# Patient Record
Sex: MALE | Race: WHITE
[De-identification: names, ages, dates, MRNs, and addresses within clinical notes are randomized per-mention and may not be internally consistent; named-entity substitution may affect disease eponyms.]

---

## 2019-07-25 ENCOUNTER — HOSPITAL ENCOUNTER (OUTPATIENT)
Dept: HOSPITAL 53 - M SMT | Age: 44
End: 2019-07-25
Attending: NURSE PRACTITIONER
Payer: COMMERCIAL

## 2019-07-25 DIAGNOSIS — R31.29: Primary | ICD-10-CM

## 2019-07-25 LAB
APPEARANCE UR: CLEAR
BACTERIA UR QL AUTO: NEGATIVE
BILIRUB UR QL STRIP.AUTO: NEGATIVE
GLUCOSE UR QL STRIP.AUTO: NEGATIVE MG/DL
HGB UR QL STRIP.AUTO: NEGATIVE
KETONES UR QL STRIP.AUTO: NEGATIVE MG/DL
LEUKOCYTE ESTERASE UR QL STRIP.AUTO: NEGATIVE
MUCOUS THREADS URNS QL MICRO: (no result)
NITRITE UR QL STRIP.AUTO: NEGATIVE
PH UR STRIP.AUTO: 5 UNITS (ref 5–9)
PROT UR QL STRIP.AUTO: NEGATIVE MG/DL
RBC # UR AUTO: 1 /HPF (ref 0–3)
SP GR UR STRIP.AUTO: 1.02 (ref 1–1.03)
SQUAMOUS #/AREA URNS AUTO: 0 /HPF (ref 0–6)
UROBILINOGEN UR QL STRIP.AUTO: 0.2 MG/DL (ref 0–2)
WBC #/AREA URNS AUTO: 0 /HPF (ref 0–3)

## 2019-07-29 ENCOUNTER — HOSPITAL ENCOUNTER (OUTPATIENT)
Dept: HOSPITAL 53 - M SMT | Age: 44
End: 2019-07-29
Attending: INTERNAL MEDICINE
Payer: COMMERCIAL

## 2019-07-29 DIAGNOSIS — J44.9: Primary | ICD-10-CM

## 2019-07-29 NOTE — REP
Clinical:  COPD.

 

Technique:  PA and lateral.

 

Comparison:  02/28/2006.

 

Findings:

Mediastinum and cardiac silhouette are normal.  Bibasilar linear acute and/or

chronic fibroatelectatic changes appreciated.  No focal consolidation.  No

effusion.  No pneumothorax.  Skeletal structures intact.

 

Impression:

Acute versus chronic bibasilar fibroatelectatic changes.

 

 

Electronically Signed by

Arnaldo Aguilera MD 07/29/2019 02:46 P

## 2019-09-10 ENCOUNTER — HOSPITAL ENCOUNTER (OUTPATIENT)
Dept: HOSPITAL 53 - M LABNEURO | Age: 44
End: 2019-09-10
Attending: PSYCHIATRY & NEUROLOGY
Payer: COMMERCIAL

## 2019-09-10 DIAGNOSIS — E07.9: ICD-10-CM

## 2019-09-10 DIAGNOSIS — E11.9: Primary | ICD-10-CM

## 2019-09-10 DIAGNOSIS — Z11.9: ICD-10-CM

## 2019-09-10 DIAGNOSIS — M79.2: ICD-10-CM

## 2019-09-10 LAB
ALBUMIN SERPL BCG-MCNC: 3.9 GM/DL (ref 3.2–5.2)
ALT SERPL W P-5'-P-CCNC: 21 U/L (ref 12–78)
BASOPHILS # BLD AUTO: 0.1 10^3/UL (ref 0–0.2)
BASOPHILS NFR BLD AUTO: 0.8 % (ref 0–1)
BILIRUB SERPL-MCNC: 0.3 MG/DL (ref 0.2–1)
BUN SERPL-MCNC: 17 MG/DL (ref 7–18)
CALCIUM SERPL-MCNC: 9.2 MG/DL (ref 8.5–10.1)
CHLORIDE SERPL-SCNC: 106 MEQ/L (ref 98–107)
CO2 SERPL-SCNC: 28 MEQ/L (ref 21–32)
CREAT SERPL-MCNC: 1.11 MG/DL (ref 0.7–1.3)
EOSINOPHIL # BLD AUTO: 0.3 10^3/UL (ref 0–0.5)
EOSINOPHIL NFR BLD AUTO: 2.5 % (ref 0–3)
ERYTHROCYTE [SEDIMENTATION RATE] IN BLOOD BY WESTERGREN METHOD: 4 MM/HR (ref 0–15)
EST. AVERAGE GLUCOSE BLD GHB EST-MCNC: 108 MG/DL (ref 60–110)
FOLATE SERPL-MCNC: 6.6 NG/ML
GFR SERPL CREATININE-BSD FRML MDRD: > 60 ML/MIN/{1.73_M2} (ref 60–?)
GLUCOSE SERPL-MCNC: 96 MG/DL (ref 70–100)
HCT VFR BLD AUTO: 45.9 % (ref 42–52)
HGB BLD-MCNC: 15 G/DL (ref 13.5–17.5)
LYMPHOCYTES # BLD AUTO: 3.8 10^3/UL (ref 1.5–5)
LYMPHOCYTES NFR BLD AUTO: 38.7 % (ref 24–44)
MCH RBC QN AUTO: 30.5 PG (ref 27–33)
MCHC RBC AUTO-ENTMCNC: 32.7 G/DL (ref 32–36.5)
MCV RBC AUTO: 93.5 FL (ref 80–96)
MONOCYTES # BLD AUTO: 0.7 10^3/UL (ref 0–0.8)
MONOCYTES NFR BLD AUTO: 6.6 % (ref 0–5)
NEUTROPHILS # BLD AUTO: 5.1 10^3/UL (ref 1.5–8.5)
NEUTROPHILS NFR BLD AUTO: 51 % (ref 36–66)
PLATELET # BLD AUTO: 273 10^3/UL (ref 150–450)
POTASSIUM SERPL-SCNC: 4.6 MEQ/L (ref 3.5–5.1)
PROT SERPL-MCNC: 6.7 GM/DL (ref 6.4–8.2)
RBC # BLD AUTO: 4.91 10^6/UL (ref 4.3–6.1)
RHEUMATOID FACT SERPL-ACNC: < 10 IU/ML (ref ?–15)
SODIUM SERPL-SCNC: 141 MEQ/L (ref 136–145)
T4 FREE SERPL-MCNC: 0.79 NG/DL (ref 0.76–1.46)
VIT B12 SERPL-MCNC: 454 PG/ML
WBC # BLD AUTO: 9.9 10^3/UL (ref 4–10)

## 2019-09-14 LAB
A-TOCOPHEROL VIT E SERPL-MCNC: 12.7 MG/L (ref 7–25.1)
B BURGDOR IGG+IGM SER-ACNC: <0.91 ISR (ref 0–0.9)
B BURGDOR IGM SER IA-ACNC: <0.8 INDEX (ref 0–0.79)
GAMMA TOCOPHEROL SERPL-MCNC: 2.3 MG/L (ref 0.5–5.5)
PYRIDOXAL PHOS SERPL-MCNC: 9.9 UG/L (ref 5.3–46.7)
VIT B1 BLD-SCNC: 168.1 NMOL/L (ref 66.5–200)

## 2019-11-11 ENCOUNTER — HOSPITAL ENCOUNTER (OUTPATIENT)
Dept: HOSPITAL 53 - M SMT | Age: 44
End: 2019-11-11
Attending: NURSE PRACTITIONER
Payer: COMMERCIAL

## 2019-11-11 DIAGNOSIS — N39.41: Primary | ICD-10-CM

## 2019-11-11 LAB
APPEARANCE UR: CLEAR
BACTERIA UR QL AUTO: NEGATIVE
BILIRUB UR QL STRIP.AUTO: NEGATIVE
GLUCOSE UR QL STRIP.AUTO: NEGATIVE MG/DL
HGB UR QL STRIP.AUTO: NEGATIVE
KETONES UR QL STRIP.AUTO: (no result) MG/DL
LEUKOCYTE ESTERASE UR QL STRIP.AUTO: NEGATIVE
NITRITE UR QL STRIP.AUTO: NEGATIVE
PH UR STRIP.AUTO: 6 UNITS (ref 5–9)
PROT UR QL STRIP.AUTO: (no result) MG/DL
RBC # UR AUTO: 0 /HPF (ref 0–3)
SP GR UR STRIP.AUTO: 1.02 (ref 1–1.03)
SQUAMOUS #/AREA URNS AUTO: 0 /HPF (ref 0–6)
UROBILINOGEN UR QL STRIP.AUTO: 0.2 MG/DL (ref 0–2)
WBC #/AREA URNS AUTO: 2 /HPF (ref 0–3)

## 2019-11-25 ENCOUNTER — HOSPITAL ENCOUNTER (OUTPATIENT)
Dept: HOSPITAL 53 - M PLALAB | Age: 44
End: 2019-11-25
Attending: NURSE PRACTITIONER
Payer: COMMERCIAL

## 2019-11-25 ENCOUNTER — HOSPITAL ENCOUNTER (OUTPATIENT)
Dept: HOSPITAL 53 - M PLALAB | Age: 44
End: 2019-11-25
Attending: PHYSICIAN ASSISTANT
Payer: COMMERCIAL

## 2019-11-25 DIAGNOSIS — N39.41: ICD-10-CM

## 2019-11-25 DIAGNOSIS — F41.9: Primary | ICD-10-CM

## 2019-11-25 DIAGNOSIS — Z01.818: Primary | ICD-10-CM

## 2019-11-25 LAB
APTT BLD: 35.8 SECONDS (ref 25–38.4)
BUN SERPL-MCNC: 17 MG/DL (ref 7–18)
CALCIUM SERPL-MCNC: 9.6 MG/DL (ref 8.5–10.1)
CHLORIDE SERPL-SCNC: 104 MEQ/L (ref 98–107)
CO2 SERPL-SCNC: 28 MEQ/L (ref 21–32)
CREAT SERPL-MCNC: 1.45 MG/DL (ref 0.7–1.3)
GFR SERPL CREATININE-BSD FRML MDRD: 56.5 ML/MIN/{1.73_M2} (ref 60–?)
GLUCOSE SERPL-MCNC: 83 MG/DL (ref 70–100)
HCT VFR BLD AUTO: 51.4 % (ref 42–52)
HGB BLD-MCNC: 16.8 G/DL (ref 13.5–17.5)
INR PPP: 1.02
MCH RBC QN AUTO: 30.8 PG (ref 27–33)
MCHC RBC AUTO-ENTMCNC: 32.7 G/DL (ref 32–36.5)
MCV RBC AUTO: 94.1 FL (ref 80–96)
PLATELET # BLD AUTO: 323 10^3/UL (ref 150–450)
POTASSIUM SERPL-SCNC: 5.7 MEQ/L (ref 3.5–5.1)
PROTHROMBIN TIME: 13.1 SECONDS (ref 11.8–14)
RBC # BLD AUTO: 5.46 10^6/UL (ref 4.3–6.1)
SODIUM SERPL-SCNC: 137 MEQ/L (ref 136–145)
T4 FREE SERPL-MCNC: 0.96 NG/DL (ref 0.76–1.46)
TSH SERPL DL<=0.005 MIU/L-ACNC: 2.71 UIU/ML (ref 0.36–3.74)
WBC # BLD AUTO: 10 10^3/UL (ref 4–10)

## 2019-11-26 ENCOUNTER — HOSPITAL ENCOUNTER (OUTPATIENT)
Dept: HOSPITAL 53 - M SFHCPLAZ | Age: 44
End: 2019-11-26
Attending: PHYSICIAN ASSISTANT
Payer: COMMERCIAL

## 2019-11-26 DIAGNOSIS — Z53.9: ICD-10-CM

## 2019-11-26 DIAGNOSIS — N18.3: Primary | ICD-10-CM

## 2019-12-15 ENCOUNTER — HOSPITAL ENCOUNTER (EMERGENCY)
Dept: HOSPITAL 53 - M ED | Age: 44
Discharge: HOME | End: 2019-12-15
Payer: COMMERCIAL

## 2019-12-15 VITALS — SYSTOLIC BLOOD PRESSURE: 140 MMHG | DIASTOLIC BLOOD PRESSURE: 87 MMHG

## 2019-12-15 VITALS — BODY MASS INDEX: 31.92 KG/M2 | WEIGHT: 198.64 LBS | HEIGHT: 66 IN

## 2019-12-15 DIAGNOSIS — J03.90: Primary | ICD-10-CM

## 2019-12-15 DIAGNOSIS — Z88.8: ICD-10-CM

## 2019-12-15 DIAGNOSIS — Z79.899: ICD-10-CM

## 2019-12-15 DIAGNOSIS — F17.210: ICD-10-CM

## 2019-12-19 ENCOUNTER — HOSPITAL ENCOUNTER (OUTPATIENT)
Dept: HOSPITAL 53 - M PLALAB | Age: 44
End: 2019-12-19
Attending: PHYSICIAN ASSISTANT
Payer: COMMERCIAL

## 2019-12-19 DIAGNOSIS — N18.3: Primary | ICD-10-CM

## 2019-12-19 LAB
ALBUMIN SERPL BCG-MCNC: 3.8 GM/DL (ref 3.2–5.2)
ALT SERPL W P-5'-P-CCNC: 27 U/L (ref 12–78)
BILIRUB SERPL-MCNC: 0.4 MG/DL (ref 0.2–1)
BUN SERPL-MCNC: 22 MG/DL (ref 7–18)
CALCIUM SERPL-MCNC: 8.9 MG/DL (ref 8.5–10.1)
CHLORIDE SERPL-SCNC: 106 MEQ/L (ref 98–107)
CO2 SERPL-SCNC: 27 MEQ/L (ref 21–32)
CREAT SERPL-MCNC: 1.26 MG/DL (ref 0.7–1.3)
GFR SERPL CREATININE-BSD FRML MDRD: > 60 ML/MIN/{1.73_M2} (ref 60–?)
GLUCOSE SERPL-MCNC: 79 MG/DL (ref 70–100)
POTASSIUM SERPL-SCNC: 4.2 MEQ/L (ref 3.5–5.1)
PROT SERPL-MCNC: 6.9 GM/DL (ref 6.4–8.2)
SODIUM SERPL-SCNC: 140 MEQ/L (ref 136–145)

## 2019-12-27 ENCOUNTER — HOSPITAL ENCOUNTER (OUTPATIENT)
Dept: HOSPITAL 53 - M SMT | Age: 44
End: 2019-12-27
Attending: NURSE PRACTITIONER
Payer: COMMERCIAL

## 2019-12-27 DIAGNOSIS — N31.9: Primary | ICD-10-CM

## 2020-01-02 ENCOUNTER — HOSPITAL ENCOUNTER (OUTPATIENT)
Dept: HOSPITAL 53 - M SDC | Age: 45
Discharge: HOME | End: 2020-01-02
Attending: UROLOGY
Payer: COMMERCIAL

## 2020-01-02 VITALS — SYSTOLIC BLOOD PRESSURE: 119 MMHG | DIASTOLIC BLOOD PRESSURE: 64 MMHG

## 2020-01-02 VITALS — HEIGHT: 66 IN | BODY MASS INDEX: 30.6 KG/M2 | WEIGHT: 190.4 LBS

## 2020-01-02 DIAGNOSIS — Z79.899: ICD-10-CM

## 2020-01-02 DIAGNOSIS — K21.9: ICD-10-CM

## 2020-01-02 DIAGNOSIS — J44.9: ICD-10-CM

## 2020-01-02 DIAGNOSIS — F32.9: ICD-10-CM

## 2020-01-02 DIAGNOSIS — F41.9: ICD-10-CM

## 2020-01-02 DIAGNOSIS — E78.5: ICD-10-CM

## 2020-01-02 DIAGNOSIS — F17.210: ICD-10-CM

## 2020-01-02 DIAGNOSIS — N39.41: Primary | ICD-10-CM

## 2020-01-02 PROCEDURE — 52287 CYSTOSCOPY CHEMODENERVATION: CPT

## 2020-01-02 PROCEDURE — 93005 ELECTROCARDIOGRAM TRACING: CPT

## 2020-01-02 NOTE — RO
DATE OF PROCEDURE:  01/02/2020

 

PREPROCEDURE DIAGNOSIS:   Urge urinary incontinence.

 

POSTPROCEDURE DIAGNOSIS:    Urge urinary incontinence.

 

PROCEDURE:  Cystoscopy, transurethral intravesical bladder Botox injection.

 

SURGEON:  Jim Perez MD

 

ASSISTANT:  None.

 

ANESTHESIA:  MAC.

 

OPERATIVE INDICATIONS:  This is a 44-year-old male with urge urinary

incontinence, which has been refractory to oral medical therapy.  He is brought

to the operating room today for the above listed procedure.

 

DESCRIPTION OF PROCEDURE:   The patient was brought to the operating room where

MAC anesthesia was administered.  Prophylactic antibiotics were infused.  He was

then placed in the dorsal lithotomy position and prepped and draped in the usual

sterile fashion.  A rigid cystoscope was inserted into the urethral meatus and

advanced in the bladder.  The bladder appeared to be moderately trabeculated.  
At

this point, a total of 100 units of Botox reconstituted in a normal saline was

injected into the bladder at approximately 20 different sites.  Once done, there

was excellent hemostasis.  The bladder was then emptied of all fluid and this

marked conclusion of the procedure.  The patient was then taken out of the 
dorsal

lithotomy position, awakened from anesthesia and transported to the recovery 
room

in stable condition.

 

ESTIMATED BLOOD LOSS:  5 mL.

 

COMPLICATIONS:  None.

 

SPECIMENS:  None.

 

PLAN:  The patient will followup in the clinic in a few weeks for postoperative

visit.

REANNA

## 2020-01-02 NOTE — ECGEPIP
[facility]

                                       

                                       Test Date:    2020

Pat Name:     AMAN MENDOZA               Department:   

Patient ID:   [id number]                 Room:         -

Gender:       Male                     Technician:   JUNE

:          1975               Requested By: CATRACHO MARR

Order Number: [id number]     Reading MD:   Edgard Dejesus

                                 Measurements

Intervals                              Axis          

Rate:         59                       P:            4

MO:           150                      QRS:          33

QRSD:         88                       T:            33

QT:           387                                    

QTc:          385                                    

                           Interpretive Statements

Sinus bradycardia

Delayed anterior R wave progression

Early repolarization

Comparison tracing not on file

Electronically Signed on 2020 20:42:59 EST by Edgard Dejesus

## 2020-02-29 ENCOUNTER — HOSPITAL ENCOUNTER (OUTPATIENT)
Dept: HOSPITAL 53 - M RAD | Age: 45
End: 2020-02-29
Attending: ORTHOPAEDIC SURGERY
Payer: COMMERCIAL
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
## 2020-02-29 DIAGNOSIS — S46.812A: ICD-10-CM

## 2020-02-29 DIAGNOSIS — M75.41: ICD-10-CM
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
## 2020-02-29 DIAGNOSIS — S46.011A: Primary | ICD-10-CM
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
## 2020-03-01 NOTE — REP
MRI RIGHT SHOULDER:

 

TECHNIQUE:  Axial T2 fat sat, gradient echo, sagittal oblique T2 fat sat, coronal

oblique T1, T2 fat sat.

 

Supraspinatus tendon demonstrates focal undersurface tear in the distal insertion

site. This is a partial undersurface tear. There is scattered, ill-defined high

signal in the subscapularis tendon compatible with mild to moderate

tendinopathy/tendonitis. Infraspinatus tendon demonstrates scattered

hyperintensity in the distal aspect of the tendon, suggesting tendonitis and

probably a partial intrasubstance/undersurface tear.  There are moderate

hypertrophic degenerative changes of the acromioclavicular joint with downward

sloping of the acromion, which is type 1. Biceps tendon is within the bicipital

groove. There is no Hill-Sachs deformity. There is linear high signal in the

lateral deltoid muscle suggesting a small partial tear. There appears to be some

fraying of the biceps labral complex, but otherwise no discrete labral tear is

seen. There is no paralabral cyst. There is subchondral marrow edema at the

acromioclavicular joint. There is mild fluid in the subacromial/subdeltoid

bursae. There is mild chondromalacia of the glenohumeral joint.

 

IMPRESSION:

 

Partial undersurface tear distal supraspinatus tendon. Partial intrasubstance and

undersurface tear infraspinatus tendon. Mild to moderate subscapularis

tendinopathy/tendonitis. Moderate hypertrophic degenerative changes

acromioclavicular joint. Partial tear lateral deltoid muscle. Fraying of the

biceps labral complex without a discrete labral tear. Very mild fluid in the

subacromial/subdeltoid bursae.

 

 

Electronically Signed by

Trenton Modi MD 03/01/2020 06:24 P

## 2020-04-27 ENCOUNTER — HOSPITAL ENCOUNTER (OUTPATIENT)
Dept: HOSPITAL 53 - M EKG | Age: 45
End: 2020-04-27
Attending: ORTHOPAEDIC SURGERY
Payer: COMMERCIAL

## 2020-04-27 ENCOUNTER — HOSPITAL ENCOUNTER (OUTPATIENT)
Dept: HOSPITAL 53 - M PLALAB | Age: 45
End: 2020-04-27
Attending: ORTHOPAEDIC SURGERY
Payer: COMMERCIAL

## 2020-04-27 DIAGNOSIS — M75.41: Primary | ICD-10-CM

## 2020-04-27 DIAGNOSIS — Z01.818: ICD-10-CM

## 2020-04-27 LAB
CHLORIDE SERPL-SCNC: 105 MEQ/L (ref 98–107)
CO2 SERPL-SCNC: 30 MEQ/L (ref 21–32)
HCT VFR BLD AUTO: 46.4 % (ref 42–52)
HGB BLD-MCNC: 15.5 G/DL (ref 13.5–17.5)
MCH RBC QN AUTO: 32 PG (ref 27–33)
MCHC RBC AUTO-ENTMCNC: 33.4 G/DL (ref 32–36.5)
MCV RBC AUTO: 95.9 FL (ref 80–96)
PLATELET # BLD AUTO: 304 10^3/UL (ref 150–450)
POTASSIUM SERPL-SCNC: 4.5 MEQ/L (ref 3.5–5.1)
RBC # BLD AUTO: 4.84 10^6/UL (ref 4.3–6.1)
SODIUM SERPL-SCNC: 139 MEQ/L (ref 136–145)
WBC # BLD AUTO: 9.2 10^3/UL (ref 4–10)

## 2020-04-28 ENCOUNTER — HOSPITAL ENCOUNTER (OUTPATIENT)
Dept: HOSPITAL 53 - M LABSMTC | Age: 45
End: 2020-04-28
Attending: ANESTHESIOLOGY
Payer: COMMERCIAL

## 2020-04-28 DIAGNOSIS — Z11.59: ICD-10-CM

## 2020-04-28 DIAGNOSIS — Z01.818: Primary | ICD-10-CM

## 2020-04-28 NOTE — ECGEPIP
[facility]

                                       

                                       Test Date:    2020

Pat Name:     AMAN MENDOZA               Department:   

Patient ID:   [id number]                 Room:         -

Gender:       Male                     Technician:   ARNOLDO

:          1975               Requested By: NEELIMA BUCIO 

Order Number: [id number]     Reading MD:   Edgard Dejesus

                                 Measurements

Intervals                              Axis          

Rate:         62                       P:            -26

DC:           152                      QRS:          55

QRSD:         72                       T:            58

QT:           375                                    

QTc:          383                                    

                           Interpretive Statements

Normal sinus rhythm

Delayed anterior R wave progression

Early repolarization

No significant change when compared to prior tracing of 2020

Electronically Signed on 2020 7:08:39 EDT by Edgard Dejesus

## 2020-04-28 NOTE — REPPI
Clinical:  Preoperative assessment.  History of asthma/COPD .

 

Comparison: 07/29/2019 .

 

Technique:  PA and lateral.

 

Findings:

The mediastinum and cardiac silhouette are normal.  The lung fields are clear and

without acute consolidation, effusion, or pneumothorax.  The skeletal structures

are intact and normal.

 

Impression:

1.   No acute cardiopulmonary process.

 

 

Electronically Signed by

Arnaldo Aguilera MD 04/28/2020 04:32 A

## 2020-04-30 ENCOUNTER — HOSPITAL ENCOUNTER (OUTPATIENT)
Dept: HOSPITAL 53 - M SDC | Age: 45
Discharge: HOME | End: 2020-04-30
Attending: ORTHOPAEDIC SURGERY
Payer: COMMERCIAL

## 2020-04-30 VITALS — BODY MASS INDEX: 30.22 KG/M2 | WEIGHT: 188 LBS | HEIGHT: 66 IN

## 2020-04-30 VITALS — SYSTOLIC BLOOD PRESSURE: 129 MMHG | DIASTOLIC BLOOD PRESSURE: 85 MMHG

## 2020-04-30 DIAGNOSIS — I10: ICD-10-CM

## 2020-04-30 DIAGNOSIS — J44.9: ICD-10-CM

## 2020-04-30 DIAGNOSIS — F32.9: ICD-10-CM

## 2020-04-30 DIAGNOSIS — E78.5: ICD-10-CM

## 2020-04-30 DIAGNOSIS — M75.41: Primary | ICD-10-CM

## 2020-04-30 DIAGNOSIS — Z79.899: ICD-10-CM

## 2020-04-30 DIAGNOSIS — Z79.51: ICD-10-CM

## 2020-04-30 DIAGNOSIS — M75.111: ICD-10-CM

## 2020-04-30 DIAGNOSIS — F41.9: ICD-10-CM

## 2020-04-30 PROCEDURE — 29823 SHO ARTHRS SRG XTNSV DBRDMT: CPT

## 2020-04-30 PROCEDURE — 29826 SHO ARTHRS SRG DECOMPRESSION: CPT

## 2020-04-30 PROCEDURE — 29824 SHO ARTHRS SRG DSTL CLAVICLC: CPT

## 2020-04-30 NOTE — RO
DATE OF PROCEDURE:  04/30/2020

 

PREOPERATIVE DIAGNOSIS:  Right shoulder impingement syndrome and partial

undersurface tear supraspinatus tendon.

 

POSTOPERATIVE DIAGNOSIS:  Right shoulder impingement syndrome and partial

undersurface tear supraspinatus tendon.

 

PLANNED PROCEDURE:  Right shoulder arthroscopy, distal clavicle excision,

subacromial decompression.

 

PROCEDURE PERFORMED:  Right shoulder arthroscopy, extensive debridement

intra-articular shoulder joint, subacromial decompression, debridement

undersurface, rotator cuff takedown coracoacromial (CA) ligament and distal

clavicle excision.

 

SURGEON:  Jose Guerra MD

 

ASSISTANT:  Jeffrey Corbin

 

ANESTHETIST:  Dr. Verdugo

 

TYPE OF ANESTHETIC:  General anesthetic and preoperative block.

 

OPERATIVE PREAMBLE:  This 45-year-old man was experiencing pain in his right

shoulder refractory to nonsurgical management.  MRI demonstrated partial

undersurface tear of the distal supraspinatus tendon.  There was also pain at 
his

acromioclavicular (AC) joint with positive cross-body adduction test.  We talked

about the pros, the cons, the risks and benefits of nonsurgical management 
versus

arthroscopy for subacromial decompression, distal clavicle excision, 
debridement,

partial undersurface tear, as well diagnostic arthroscopy.  He wished to go

ahead.  We reiterated the risks in preoperative holding.  I marked the right

upper extremity and proceeded to surgery.

 

The patient had received a preoperative block by the anesthetic team.

 

DESCRIPTION OF PROCEDURE:  The patient was brought to the operating theater.  He

was administered general anesthetic.  He was placed right lateral decubitus.  We

used an axillary roll.  All bony prominences were padded.  Sequential 
compression

devices (SCDs) were used on the down leg.  Venus Hugger was used.  Limb was

prepped and draped in the usual sterile fashion, allowing ample over 3 minutes

prep solution drying time.  Bed was normally situated in the room.  Limb was

placed into a 45-degree abduction traction setup with 10 pounds of traction to

the arm.  Preoperative time-out was performed to confirm the site, the patient

and the surgery, and the patient had received 2 grams of IV Ancef prior to the

start of the case.

 

Began by making a standard posterior arthroscopy portal, inserting arthroscope

into the intra-articular portion of the right shoulder.  I performed a full

diagnostic arthroscopy.  Subscapularis was normal.  There was mild fraying at 
the

biceps sheath, which was gently debrided.  Biceps root was normal and stable and

solid on probing after making inside-out anterior arthroscopy portal through the

rotator interval just posterior to the biceps tendon.  Cartilage on the humeral

head and glenoid was normal.  There did appear to be a slightly diminutive

anterior inferior labrum; however, the patient was not having instability

symptoms preoperatively.  Arthroscopy pictures were taken throughout.  There was

a small undersurface fraying to the anterior leading into the supraspinatus

tendon.  This was marked through inside-out spinal needle localization 
technique.

 

 

Arthroscope was removed from the intra-articular portion of the shoulder and

inserted into the subacromial space.  Lateral portal was created using a

previously inserted spinal needle.  Following full subacromial debridement of 
the

bursa.  The bursa was minimally inflamed.  I then used the bur to perform

subacromial decompression well as distal clavicle excision for

a width of approximately 1.1 cm distal clavicle.  I inserted

the arthroscope into the AC joint to ensure full debridement to the superior

surface.

 

I then probed the partial thickness area of the anterior leading into

supraspinatus tear.  This was definitely under 50% and had good detachment all

the way out to the lateral footprint, so it was elected not to take this down or

perform a  partial repair as this was under 50% of the tendon thickness.

 

The shoulder was thoroughly irrigated.  The scope was removed.  Arthroscopy

pictures saved onto the [employer] system and printed out.  Portal sites, three of

them, closed with subcutaneous #3-0 Monocryl and Steri-Strips were applied after

the wound was cleaned with a wet and dry dressing.  Adaptic, 4x8 gauze and ABD

dressings were placed and overwrapped with cloth tape.  The patient was put into

an upper extremity abduction pillow sling after being removed from the traction

setup.

 

The patient was woken up from general anesthetic, transferred off the operating

table, and taken to the postanesthetic care unit in stable condition.  All

sponge, needle, and instrument counts were correct.  No complications.  
Estimated

blood loss 50 mL.

 

PLAN:  The patient will be discharged home according to day-surgery criteria.

Prescription has been sent into their pharmacy of choice electronically.  They

will follow up in the office in 2 weeks' time.  I would like them to change the

dressing on postoperative day #1 and start immediate pendulum exercises as well

as Bakari elbow exercises.

REANNA

## 2020-05-31 ENCOUNTER — HOSPITAL ENCOUNTER (OUTPATIENT)
Dept: HOSPITAL 53 - M LABSMTC | Age: 45
End: 2020-05-31
Attending: PHYSICIAN ASSISTANT
Payer: COMMERCIAL

## 2020-05-31 DIAGNOSIS — Z11.59: Primary | ICD-10-CM

## 2020-06-25 ENCOUNTER — HOSPITAL ENCOUNTER (OUTPATIENT)
Dept: HOSPITAL 53 - M SFHCPLAZ | Age: 45
End: 2020-06-25
Attending: PHYSICIAN ASSISTANT
Payer: COMMERCIAL

## 2020-06-25 DIAGNOSIS — E78.2: Primary | ICD-10-CM

## 2020-06-25 LAB
ALBUMIN SERPL BCG-MCNC: 3.9 GM/DL (ref 3.2–5.2)
ALT SERPL W P-5'-P-CCNC: 31 U/L (ref 12–78)
BILIRUB SERPL-MCNC: 0.3 MG/DL (ref 0.2–1)
BUN SERPL-MCNC: 15 MG/DL (ref 7–18)
CALCIUM SERPL-MCNC: 8.8 MG/DL (ref 8.5–10.1)
CHLORIDE SERPL-SCNC: 107 MEQ/L (ref 98–107)
CHOLEST SERPL-MCNC: 233 MG/DL (ref ?–200)
CHOLEST/HDLC SERPL: 5.68 {RATIO} (ref ?–5)
CK SERPL-CCNC: 115 U/L (ref 39–308)
CO2 SERPL-SCNC: 27 MEQ/L (ref 21–32)
CREAT SERPL-MCNC: 1.23 MG/DL (ref 0.7–1.3)
GFR SERPL CREATININE-BSD FRML MDRD: > 60 ML/MIN/{1.73_M2} (ref 60–?)
GLUCOSE SERPL-MCNC: 88 MG/DL (ref 70–100)
HDLC SERPL-MCNC: 41 MG/DL (ref 40–?)
LDLC SERPL CALC-MCNC: 147 MG/DL (ref ?–100)
NONHDLC SERPL-MCNC: 192 MG/DL
POTASSIUM SERPL-SCNC: 4.6 MEQ/L (ref 3.5–5.1)
PROT SERPL-MCNC: 7.3 GM/DL (ref 6.4–8.2)
SODIUM SERPL-SCNC: 139 MEQ/L (ref 136–145)
TRIGL SERPL-MCNC: 223 MG/DL (ref ?–150)

## 2020-06-27 ENCOUNTER — HOSPITAL ENCOUNTER (OUTPATIENT)
Dept: HOSPITAL 53 - M LABSMTC | Age: 45
End: 2020-06-27
Attending: ORTHOPAEDIC SURGERY
Payer: COMMERCIAL

## 2020-06-27 DIAGNOSIS — Z03.89: ICD-10-CM

## 2020-06-27 DIAGNOSIS — Z11.59: Primary | ICD-10-CM

## 2021-01-22 ENCOUNTER — HOSPITAL ENCOUNTER (OUTPATIENT)
Dept: HOSPITAL 53 - M LABSMTC | Age: 46
End: 2021-01-22
Attending: ANESTHESIOLOGY
Payer: COMMERCIAL

## 2021-01-22 ENCOUNTER — HOSPITAL ENCOUNTER (OUTPATIENT)
Dept: HOSPITAL 53 - M SFHCPLAZ | Age: 46
End: 2021-01-22
Attending: FAMILY MEDICINE
Payer: COMMERCIAL

## 2021-01-22 DIAGNOSIS — N18.30: Primary | ICD-10-CM

## 2021-01-22 DIAGNOSIS — Z01.812: Primary | ICD-10-CM

## 2021-01-22 DIAGNOSIS — Z20.822: ICD-10-CM

## 2021-01-22 LAB
BUN SERPL-MCNC: 12 MG/DL (ref 7–18)
CALCIUM SERPL-MCNC: 9.3 MG/DL (ref 8.5–10.1)
CHLORIDE SERPL-SCNC: 108 MEQ/L (ref 98–107)
CO2 SERPL-SCNC: 28 MEQ/L (ref 21–32)
CREAT SERPL-MCNC: 1.02 MG/DL (ref 0.7–1.3)
GFR SERPL CREATININE-BSD FRML MDRD: > 60 ML/MIN/{1.73_M2} (ref 60–?)
GLUCOSE SERPL-MCNC: 78 MG/DL (ref 70–100)
POTASSIUM SERPL-SCNC: 4.6 MEQ/L (ref 3.5–5.1)
SODIUM SERPL-SCNC: 140 MEQ/L (ref 136–145)

## 2021-01-27 ENCOUNTER — HOSPITAL ENCOUNTER (OUTPATIENT)
Dept: HOSPITAL 53 - M SDC | Age: 46
Discharge: HOME | End: 2021-01-27
Attending: ORTHOPAEDIC SURGERY
Payer: COMMERCIAL

## 2021-01-27 VITALS — BODY MASS INDEX: 31.5 KG/M2 | WEIGHT: 196 LBS | HEIGHT: 66 IN

## 2021-01-27 VITALS — DIASTOLIC BLOOD PRESSURE: 79 MMHG | SYSTOLIC BLOOD PRESSURE: 148 MMHG

## 2021-01-27 DIAGNOSIS — N18.30: ICD-10-CM

## 2021-01-27 DIAGNOSIS — K21.9: ICD-10-CM
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
## 2021-01-27 DIAGNOSIS — F32.9: ICD-10-CM

## 2021-01-27 DIAGNOSIS — F41.9: ICD-10-CM

## 2021-01-27 DIAGNOSIS — S46.012A: Primary | ICD-10-CM

## 2021-01-27 DIAGNOSIS — J44.9: ICD-10-CM

## 2021-01-27 DIAGNOSIS — F17.290: ICD-10-CM
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
## 2021-01-27 DIAGNOSIS — F43.10: ICD-10-CM

## 2021-01-27 DIAGNOSIS — Z88.8: ICD-10-CM
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
## 2021-01-27 DIAGNOSIS — Z79.51: ICD-10-CM

## 2021-01-27 DIAGNOSIS — Z79.899: ICD-10-CM

## 2021-01-27 PROCEDURE — 29827 SHO ARTHRS SRG RT8TR CUF RPR: CPT

## 2021-01-27 PROCEDURE — 64415 NJX AA&/STRD BRCH PLXS IMG: CPT

## 2021-01-27 RX ADMIN — MIDAZOLAM PRN MG: 1 INJECTION INTRAMUSCULAR; INTRAVENOUS at 07:24

## 2021-01-27 NOTE — CCD
Summarization of Episode Note

                             Created on: 2021



AMAN SHIN JULES

External Reference #: [id number]

: 1975

Sex: Male



Demographics





                          Address                   [address]

[place]  [address]

 

                          Home Phone                [phone]

 

                          Preferred Language        Unknown

 

                          Marital Status            Unknown

 

                          Mu-ism Affiliation     Unknown

 

                          Race                      White

 

                          Ethnic Group              Not  or 





Author





                          Author                    [facility]

ems

 

                          Organization              [facility]

ems

 

                          Address                   Unknown

 

                          Phone                     Unavailable







Support





                Name            Relationship    Address         Phone

 

                    AMAN SHIN         GUAR                [address]

[place], [place]  [address] [phone]

 

                    Ana Shin          ECON                [address]

[place]  [address]                       [phone]







Care Team Providers





                    Care Team Member Name Role                Phone

 

                    Eliza Mcdermott      Unavailable         [phone]







PROBLEMS





          Type      Condition ICD9-CM Code WFW38-[place] Code Onset Dates Condition S

tatus SNOMED 

Code                                    Notes

 

        Problem Neurogenic bladder         N31.9           Active  223368131  

 

        Problem Body mass index (BMI) 34.0-34.9, adult         Z68.34          A

ctive  029290014  

 

        Problem Urge incontinence         N39.41          Active  90089865  

 

        Problem Exercise-induced asthma         J45.990         Active  99106298

  

 

                Problem         Gastroesophageal reflux disease, esophagitis pre

sence not specified                 

K21.9                           Active          967969657       He is on omepraz

ole with control of his symptoms. He 

has seen a gastroenterologist in the past. No records available.

 

          Problem   Major depressive disorder, single episode, unspecified      

     F32.9               Active    

10510618                                 

 

           Problem    Chronic obstructive pulmonary disease, unspecified COPD ty

pe            J44.9                 

Active                    23661088                  He apparently carries this d

iagnosis and is maintained on 

Spiriva and Breo. No active exacerbation. Unfortunately he is still smoking. No 
PFTs are readily available in the medical record. He has been advised to quit 
smoking.

 

        Problem Anxiety disorder, unspecified         F41.9           Active  23

7764413 He is on 

clonidine and high-dose fluoxetine. Symptoms are palliated.

 

        Problem Chronic kidney disease, stage 3 (moderate)         N18.3        

   Active  175881832 

Most recent lab work in 2019 was not remarkable.

 

        Problem Lumbar degenerative disc disease         M51.36          Active 

 46268022  

 

        Problem Insomnia due to other mental disorder         F51.05          Ac

tive  71881098  

 

        Problem Obesity, unspecified         E66.9           Active  726517508  

 

        Problem Feeling of incomplete bladder emptying         R39.14          A

ctive  593846785  

 

        Problem Mixed hyperlipidemia         E78.2           Active  221586647 T

reated by his primary 

provider with lovastatin. I cannot locate a recent lipid profile.

 

        Problem Post-traumatic stress disorder, unspecified         F43.10      

    Active  31756279  

 

        Problem Generalized anxiety disorder         F41.1           Active  218

23081  

 

          Problem   Major depressive disorder, recurrent, moderate           F33

.1               Active    

[id number]                                

 

        Problem Mental disorder, not otherwise specified         F99            

 Active  44760940  







ALLERGIES





                    Allergen (clinical drug ingredient) Drug/Non Drug Allergy do

cumented on EMR 

Reaction            Allergy Type        Onset Date          Status

 

                      Feathers   Unknown    Non Drug Allergy            Active

 

                      Effexor    Excessive Emotions Drug Allergy            Acti

ve

 

           escitalopram Lexapro(NDC Code:[address]-07) Fatigue    Drug Allergy 

           Active

 

                      Wellbutrin Aggressive Drug Allergy            Active







ENCOUNTERS from 1975 to 2021





             Encounter    Location     Date         Provider     Diagnosis

 

                [address] [address]     Eliza Mcdermott                                 Pre-op evaluation Z01.818 ; Fistula, lef

t shoulder M25.112 ; Chronic 

obstructive pulmonary disease, unspecified COPD type J44.9 ; Chronic kidney 
disease, stage 3 (moderate) N18.3 ; Exercise-induced asthma J45.990 ; Mixed 
hyperlipidemia E78.2 ; Major depressive disorder, single episode, unspecified 
F32.9 and Gastroesophageal reflux disease, esophagitis presence not specified 
K21.9







IMMUNIZATIONS

No Information



SOCIAL HISTORY

Tobacco Use:



                    Social History Observation Description         Date

 

                    Details (start date - stop date) Current Smoker       



Sex Assigned At Birth:



                          Social History Observation Description

 

                          Sex Assigned At Birth     Unknown



Education:



                    Question            Answer              Notes

 

                    Level of Education: High School          



Audit



                    Question            Answer              Notes

 

                    Total Score:        1                    

 

                    Interpretation:     Alcohol Education    



Language:



                    Question            Answer              Notes

 

                    Languages spoken:   English              

 

                    Languages spoken:   English              



Shinto:



                    Question            Answer              Notes

 

                    Shinto                                No Mandaen beliefs

 that would impact health care.

 

                    Shinto            [employer]    



Sexual Hx:



                    Question            Answer              Notes

 

                    Had sex in the last 12 months (vaginal, oral, or anal)? Yes 

                 

 

                    Have you ever had an STD? No                   

 

                    with                Women only           

 

                    Use protection?     No                   



Drug and Alcohol



                    Question            Answer              Notes

 

                    Total Score:        0                    

 

                    Interpretation:     No problems reported  



Alcohol Screening:



                    Question            Answer              Notes

 

                    Did you have a drink containing alcohol in the past year? Ye

s                  

 

                    Points              3                    

 

                    Interpretation      Negative             

 

                          How often did you have six or more drinks on one occas

ion in the past year? 

Never (0 points)                         

 

                                        How many drinks did you have on a typica

l day when you were drinking in the past

year?                     1 or 2 (0 points)          

 

                          How often did you have a drink containing alcohol in t

he past year? Two to three

times per week (3 points)                



Tobacco Use:



                    Question            Answer              Notes

 

                    Are you a:          Uses tobacco in other forms SMOKE PIPE A

BOUT 10 CIGARETTES A DAY

 

                    Are you a:          current smoker       

 

                    Are you interested in quitting? Ready to quit        

 

                    Counseled the patient on tobacco use, cessation provided 10/

           







REASON FOR REFERRAL

No Information



VITAL SIGNS





                    Weight              193 lbs             

 

                    Height              66 in               

 

                    BMI                 31.15 kg/m2         

 

                    Heart Rate          107 /min            

 

                    Respiratory Rate    20 /min             

 

                    Temperature         97.6 degrees Fahrenheit 

 

                    Oximetry            95                  

 

                    Blood pressure systolic 130 mm Hg           

 

                    Blood pressure diastolic 70 mm Hg            







MEDICATIONS





           Medication SIG (Take, Route, Frequency, Duration) Notes      Start Da

te End Date   

Status

 

           Mucinex 600 MG 1 tab orally bid                                  Acti

ve

 

           FLUoxetine HCl 10 MG 1 capsule Orally Daily                          

        Active

 

           FLUoxetine HCl 40 MG 1 capsule orally Daily                          

        Active

 

           Clonidine HCl 0.2 MG 1 tablet Orally before bedtime            13 Dec

, 2019            Active

 

           Mirtazapine 7.5 MG tablets at bedtime Orally before bedtime          

                        Active

 

                          Ventolin  (90 Base) MCG/ACT 1 puff as needed In

halation every 4 hours as 

needed                                                          Active

 

           Incruse Ellipta 62.5 MCG/INH 1 puff Inhalation Once a day            

                      Active

 

           Omeprazole 40 MG 1 capsule Orally Daily                              

    Active

 

           HydrOXYzine HCl 10 MG 2 tabs Orally before bedtime                   

               Active

 

           Nystatin 894667 UNIT/GM as directed Externally B)Feet Once a day     

                             Active

 

           FLUoxetine HCl 40 MG 1 capsule Orally Once a day for 90 days         

               

Not-Taking

 

                Albuterol Sulfate (2.5 MG/3ML) 0.083% 1 vial Inhalation every 6 

hrs as needed                 

                                                    Active

 

           Breo Ellipta 200-25 MCG/INH 1 puff Inhalation Once a day             

                     Active

 

           Lovastatin 20 MG 1 tab orally Daily                                  

Active

 

           Acetaminophen 500 MG 1 capsule as needed Orally every 6 hrs          

  19 Dec, 2019            

Active







PROCEDURES from 1975 to 2021





                Procedure       Date Ordered    Result          Body Site

 

                ELECTROCARDIOGRAM, COMPLETE EKG 2021      N/A             

 







RESULTS





                    Component           Value               Reference Range

 

                                        Basic Metabolic Profile (BMP)

Reviewed date:2021 07:12:09

Interpretation:

Performing Lab:[facility] LAB[facility]AT[facility] [address] [id number] [phone], Phone:[phone],[place] [address] 

 

                    GLUCOSE, FASTING    78                  

 

                    BLOOD UREA NITROGEN 12                  7-18

 

                    CREATININE FOR GFR  1.02                0.70-1.30

 

                    GLOMERULAR FILTRATION RATE > 60.0              >60

 

                    SODIUM LEVEL        140                 136-145

 

                    POTASSIUM SERUM     4.6                 3.5-5.1

 

                    CHLORIDE LEVEL      108                 

 

                    CARBON DIOXIDE LEVEL 28                  21-32

 

                    CALCIUM LEVEL       9.3                 8.5-10.1







REASON FOR VISIT

Preop clearance for left shoulder arthoscopy with rotator cuff repair at [facility] by 
Dr. Guerra on 2021; surgeons fax ; diagnosis code M25.112



MEDICAL (GENERAL) HISTORY





                    Type                Description         Date

 

                    Surgical History    R)knee surgery      

 

                    Surgical History    L)knee surgery      

 

                    Surgical History    s/p B Inguinal Hernia Repair- Dr. Kristin hurt 

 

                    Surgical History    Inguinal Hernia Repair 

 

                    Surgical History    s/p REctal polyps removed Dr. Szymanski 



 

                    Surgical History    LEFT ELBOW PINCHED NERVE 

 

                    Surgical History    R) carpal tunnel repair 19

 

                    Surgical History    Cystoscopy apparently with Botox instill

ation?-Dr. Perez 

2019

 

                    Surgical History    Left carpal tunnel repair 6/3/2020

 

                    Surgical History    R)shoulder surgery debridement 2020







Goals Section

No Information



Health Concerns

No Information



MEDICAL EQUIPMENT

No Information



MENTAL STATUS

No Information



FUNCTIONAL STATUS

No Information



ASSESSMENTS





             Encounter Date Diagnosis    Assessment Notes Treatment Notes Treatm

ent Clinical 

Notes

 

                    Pre-op evaluation (ICD-10 - Z01.818)            

     



                                        



I discussed the risks vs. benefits of surgery with the patient in generic terms.
I feel that the patient is at low risk for perioperative complications. ECG 
today shows NSR, early repolarization; stable from prior ECG in 2020.  The 
patient knows that there is always some risk with surgery and that each 
individual has to make a decision regarding whether the benefits of surgery 
outweigh the risks in order to proceed. I advised the patient to direct further 
questions regarding the specifics of the proposed surgical procedure and 
specific risks to the surgeon. At this time I feel that the patient's acute and 
chronic medical conditions are sufficiently optimized to proceed with surgery.  



- Continue all medications in the perioperative period, does not need to hold 
any medications for surgery



- Advised to bring inhalers to the hospital with him

 

                    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       

          



                                        



Surgery is planned.

 

                                        Chronic obstructive pulmonar

y disease, unspecified COPD type (ICD-

10 - J44.9)                                         



                                        



Denies any recent exacerbations.

 

                    Chronic kidney disease, stage 3 (moderate) (ICD-

10 - N18.3)                 



                                        





 

                    Exercise-induced asthma (ICD-10 - J45.990)      

           



                                        



No recent exacerbations.

 

                    Mixed hyperlipidemia (ICD-10 - E78.2)           

      



                                        





 

                                        Major depressive disorder, s

girish episode, unspecified (ICD-10 - 

F32.9)                                              



                                        



Reports symptoms are well managed.

 

                                        Gastroesophageal reflux dise

ase, esophagitis presence not specified

(ICD-10 - K21.9)                                    



                                        











PLAN OF TREATMENT

Medication



                Medication Name Sig             Start Date      Stop Date

 

                          Ventolin  (90 Base) MCG/ACT 1 puff as needed In

halation every 4 hours as 

needed                                               

 

                Breo Ellipta 200-25 MCG/INH 1 puff Inhalation Once a day        

          

 

                Mirtazapine 7.5 MG tablets at bedtime Orally before bedtime     

             

 

                Albuterol Sulfate (2.5 MG/3ML) 0.083% 1 vial Inhalation every 6 

hrs as needed                 



 

                Clonidine HCl 0.2 MG 1 tablet Orally before bedtime 13 Dec, 2019

     

 

                Acetaminophen 500 MG 1 capsule as needed Orally every 6 hrs 19 D

2019     

 

                Incruse Ellipta 62.5 MCG/INH 1 puff Inhalation Once a day       

           

 

                Omeprazole 40 MG 1 capsule Orally Daily                  

 

                HydrOXYzine HCl 10 MG 2 tabs Orally before bedtime              

    

 

                FLUoxetine HCl 40 MG 1 capsule orally Daily                  

 

                FLUoxetine HCl 10 MG 1 capsule Orally Daily                  

 

                Lovastatin 20 MG 1 tab orally Daily                  



Treatment Notes



                    Assessment          Notes               Clinical Notes

 

                    Pre-op evaluation                       I discussed the risk

s vs. benefits of surgery with the 

patient in generic terms. I feel that the patient is at low risk for 
perioperative complications. ECG today shows NSR, early repolarization; stable 
from prior ECG in 2020.  The patient knows that there is always some risk with
surgery and that each individual has to make a decision regarding whether the 
benefits of surgery outweigh the risks in order to proceed. I advised the 
patient to direct further questions regarding the specifics of the proposed 
surgical procedure and specific risks to the surgeon. At this time I feel that 
the patient's acute and chronic medical conditions are sufficiently optimized to
proceed with surgery.- Continue all medications in the perioperative period, 
does not need to hold any medications for surgery- Advised to bring inhalers to 
the hospital with him

 

                    Fistula, left shoulder                     Surgery is planne

d.

 

                    Chronic obstructive pulmonary disease, unspecified COPD type

                     Denies any recent

exacerbations.

 

                    Exercise-induced asthma                     No recent exacer

bations.

 

                    Major depressive disorder, single episode, unspecified      

               Reports symptoms are 

well managed.



Next Appt



                                        Details

 

                                        as sched with PCP Reason:

 

                                        Provider Name:Jose Garner, 2021 10:0

0:00 AM, [address] [place] [address], [phone]

 

                                        Provider Name:Mirta Garay,  10:45:00 AM, [address] [place] [address], [address], [phone]







Insurance Providers





             Payer Name   Payer Address Payer Phone  Insured Name Patient Relati

onship to 

Insured                   Coverage Start Date       Coverage End Date

 

                [employer]         CORPORATE CLAIMS DEPT PO  [facility] [address]

[phone]    

AMAN SHIN        self

## 2021-01-27 NOTE — CCD
Continuity of Care Document (CCD)

                             Created on: 2020



Adrián Shin

External Reference #: [id number]

: 1975

Sex: Male



Demographics





                          Address                   [address] 

[place]  [address]

 

                          Home Phone                [phone]

 

                          Preferred Language        Unknown

 

                          Marital Status            Unknown

 

                          Uatsdin Affiliation     Unknown

 

                          Race                      White

 

                          Ethnic Group              Not  or 





Author





                          Author                    [facility]

 

                          Organization              Unknown

 

                          Address                   [address]  [address]



 

                          Phone                     [phone]







Care Team Providers





                    Care Team Member Name Role                Phone

 

                    Wendi Martinez MD AUTM                [phone]

 

                    Zachary Chakraborty MD      AUTM                [phone]







Problems





                    Active Problems     Provider            Date

 

                    Pure hypercholesterolemia Henry Trivedi MD  Onset: 10/07/2

019







Social History





                Type            Date            Description     Comments

 

                Birth Sex                       Unknown          

 

                ETOH Use                        Occasionally consumes alcohol  

 

                Tobacco Use     Start: Unknown  Patient is a current smoker, smo

kes every day smokes 

a pipe 







Allergies, Adverse Reactions, Alerts





             Active Allergies Reaction     Severity     Comments     Date

 

             Effexor                                             10/07/2019

 

             Wellbutrin                                          10/07/2019

 

             Lexapro                                             10/07/2019







Medications





           Active Medications SIG        Qnty       Indications Ordering Provide

r Date

 

                          Tramadol HCL                     50mg Tablets         

          1 tab po every 

4-6 hours as needed pain after surgery(please do not fill until 6/3/2020). 

10tabs                                  Henry Trivedi MD  2020

 

                          Ventolin HFA                     108(90Base) mcg/Act A

erosol                    

                                                Henry Trivedi MD 2019

 

             Breo Ellipta                     200-25mcg/Inh Aerosol             

                                             

Unknown                                 

 

                Spiriva Respimat                     1.25mcg/Act Aerosol        

                                            

                          Unknown                   

 

                          Fluoxetine HCL                     40mg Capsules      

             1 by mouth 

every day                                       Unknown         

 

           Omeprazole                     40mg Capsules DR                      

                              Unknown    



 

                                        Albuterol Sulfate HFA                   

  108(90Base) mcg/Act Aerosol           

                                                    Unknown      

 

           Trazodone HCL                     50mg Tablets                       

                             Unknown    



 

             Clonidine                     0.3mg/24HR Patches Weekly            

                                              

Unknown                                 

 

             Hydroxyzine HCL                     10mg Tablets                   

                                       Unknown

                                        

 

                    Lovastatin                     20mg Tablets                 

  1 by mouth a day     

                                        Unknown             







Immunizations





                                        Description

 

                                        No Information Available







Vital Signs





                Date            Vital           Result          Comment

 

                2019  2:32pm Body Temperature 97.7 F         

 

                2019 10:35am Body Temperature 97.7 F         

 

                    Height              65.5 inches         5'5.50"

 

                    Weight              188.00 lb            

 

                    BMI (Body Mass Index) 30.8 kg/m2           







Results





        Test    Acquired Date Facility Test    Result  H/L     Range   Note

 

                    Coronavirus 2019 Nasopharygeal 2020          [address] [address]

           (315)-   - Coronavirus 2019 Nasopharygeal Testing was perf <SEE NOTE>

                        1







                          1                         Testing was performed using 

the hollie(R) SARS-CoV-2 test.

This test was developed and its performance characteristics

determined by [facility]. This test has not been

FDA cleared or approved. This test has been authorized by

FDA under an Emergency Use Authorization (EUA). This test

is only authorized for the duration of time the declaration

that circumstances exist justifying the authorization of

the emergency use of in vitro diagnostic tests for

detection of SARS-CoV-2 virus and/or diagnosis of COVID-19

infection under section 564(b)(1) of the Act, 21 U.S.C.

                                        360bbb-3(b)(1), unless the authorization

 is terminated or

revoked sooner. When diagnostic testing is negative, the

possibility of a false negative result should be considered

in the context of a patient's recent exposures and the

presence of clinical signs and symptoms consistent with

COVID-19. An individual without symptoms of COVID-19 and

who is not shedding SARS-CoV-2 virus would expect to have a

negative (not detected) result in this assay.

Performed at:  [address]  [id number]

: Araceli B Reyes MD, Phone:  [phone]



Not Detected











Procedures





                Date            Code            Description     Status

 

                2020      21213           X-Ray Shoulder Complete Complete

d

 

                2020      69412           Therapeutic Procedure, Each 15 M

inutes Completed

 

                2020      19179           Hot Or Cold Packs Completed

 

                2020      59414           Therapeutic Procedure, Each 15 M

inutes Completed

 

                10/30/2020      49261           Therapeutic Procedure, Each 15 M

inutes Completed

 

                10/30/2020      08466           Hot Or Cold Packs Completed

 

                    10/07/2020          32387               Re-Eval Of PT Establ

ished Plan Of Care 20Mins Face To Face 

PT/Fam                                  Completed

 

                10/07/2020      32620           Therapeutic Procedure, Each 15 M

inutes Completed

 

                10/02/2020      07469           Therapeutic Procedure, Each 15 M

inutes Completed

 

                2020      18144           Therapeutic Procedure, Each 15 M

inutes Completed

 

                2020      38975           Therapeutic Procedure, Each 15 M

inutes Completed

 

                2020      17717           Hot Or Cold Packs Completed

 

                2020      94094           Therapeutic Procedure, Each 15 M

inutes Completed

 

                2020      48570           Hot Or Cold Packs Completed

 

                2020      29809           Therapeutic Procedure, Each 15 M

inutes Completed

 

                2020      85644           Hot Or Cold Packs Completed

 

                2020      81691           Therapeutic Procedure, Each 15 M

inutes Completed

 

                2020      06485           Therapeutic Procedure, Each 15 M

inutes Completed

 

                2020      89448           Hot Or Cold Packs Completed

 

                    2020          50743               Re-Eval Of PT Establ

ished Plan Of Care 20Mins Face To Face 

PT/Fam                                  Completed

 

                2020      70912           Therapeutic Procedure, Each 15 M

inutes Completed

 

                2020      88546           Therapeutic Procedure, Each 15 M

inutes Completed

 

                2020      69429           Hot Or Cold Packs Completed

 

                2020      10895           Therapeutic Procedure, Each 15 M

inutes Completed

 

                2020      31628           Hot Or Cold Packs Completed

 

                2020      74282           Therapeutic Procedure, Each 15 M

inutes Completed

 

                2020      72256           Hot Or Cold Packs Completed

 

                2020      84203           Physical Therapy Eval - Low Comp

lexity Completed

 

                2020      00690           Arthroscopy Shoulder Decompress 

Subacromial Part Acromioplasty 

Completed

 

                    2020          06917               Arthroscopy Shoulder

 Removal Loose/Foreign Body Dist 

Claviculecto                            Completed

 

                2020      13587           Physical Therapy Eval - Low Comp

lexity Completed

 

                2020      22499           Endoscopy Wrist W/Release Transv

erse Carpal Ligament Completed







Medical Devices





                                        Description

 

                                        No Information Available







Encounters





                                        Description

 

                                        No Information Available







Assessments





                Date            Code            Description     Provider

 

                2020      Z47.89          Encounter for other orthopedic a

ftercare Celena Christiano, PTA

 

                2020      Z47.89          Encounter for other orthopedic a

ftercare Stacey Scee P.T.A.

 

                10/30/2020      Z47.89          Encounter for other orthopedic a

ftercare Celena Alvarado, PTA

 

                10/07/2020      Z47.89          Encounter for other orthopedic a

ftercare Lars RIVERAKirsten Huerta, PT, 

DPT

 

                10/02/2020      Z47.89          Encounter for other orthopedic a

ftercare Celena Alvarado, PTA

 

                2020      Z47.89          Encounter for other orthopedic a

ftercare Lars MIGUELKirsten Huerta, PT, 

DPT

 

                2020      Z47.89          Encounter for other orthopedic a

ftercare Stacey Scee P.T.A.

 

                2020      Z47.89          Encounter for other orthopedic a

ftercare Stacey Scee P.T.A.

 

                2020      Z47.89          Encounter for other orthopedic a

ftercare Stacey Scee P.T.A.

 

                2020      Z47.89          Encounter for other orthopedic a

ftercare Lars Huerta, PT, 

DPT

 

                2020      Z47.89          Encounter for other orthopedic a

ftercare Stacey Scee P.T.A.

 

                2020      Z47.89          Encounter for other orthopedic a

ftercare Larssaskia Huerta, PT, 

DPT

 

                2020      Z47.89          Encounter for other orthopedic a

ftercare Stacey Scee P.T.A.

 

                2020      Z47.89          Encounter for other orthopedic a

ftercare Stacey Scee P.T.A.

 

                2020      Z47.89          Encounter for other orthopedic a

ftercare Celena Alvarado, PTA

 

                2020      Z47.89          Encounter for other orthopedic a

ftercare Lars Huerta, PT, 

DPT

 

                    2020          M75.112             Incomplete rotator c

uff tear or rupture of left shoulder, not

specified as traumatic                  Giancarlo Real PA-C

 

                2020      M75.42          Impingement syndrome of left taqueria

wilfredo Real PA-C

 

                2020      M19.012         Primary osteoarthritis, left taqueria

wilfredo Real PA-C

 

                2020      Z47.89          Encounter for other orthopedic a

ftercare Codi Sigala PA-C

 

                2020      Z47.89          Encounter for other orthopedic a

ftercare Lars Huerta, PT, 

DPT

 

                2020      G56.02          Carpal tunnel syndrome, left upp

er limb Henry Trivedi MD







Plan of Treatment

Future Appointment(s):* 2020  3:30 pm - Celena Alvarado PTA at Physical 
  Therapy Referral

* 2020  4:00 pm - Lars Huerta, PT, DPT at Physical Therapy Referral





Functional Status





                                        Description

 

                                        No Information Available







Mental Status





                                        Description

 

                                        No Information Available







Referrals





                Refer to Dr     Reason for Referral Status          Appt Date

 

                          Henry Trivedi MD        PT - 8 VISITS GOOD FOR CLAIRE S

[facility] FROM 10/19-20, AUTH 

#[id number], TRACKING #[id number]. SS  Created                   

 

                                        [address] [place] [address], [place] [address]

 

                                        [phone]

 

                                          

 

                          Henry Trivedi MD        [id number] 

DO NOT NEED AUTH, 2982, 

[id number], [id number] AUTH #[id number], SPOKE WITH MARIZA AND SHE WAS ABLE 
TO EXTEND THE AUTH FOR SURGERY FOR ME DATES ARE GOOD FROM 2020-2020.. 
SENT TO [facility] IN SURG SCHED..LD  Created                   

 

                                        [address] [place] [address], [place] [address]

 

                                        [phone]

 

                                          

 

                          Henry Trivedi MD        PT - 13 VISITS FOR CLAIRE SHLDR

S FROM -10/9/20, 

#[id number], REF #[id number]. SS  Created                   

 

                                        [address] [place] [address], [place] [address]

 

                                        [phone]

 

                                          

 

                          Henry Trivedi MD        PT EVAL [id number], R

IGTH SHOULDER, ALLOWED 1 VISIT 

THEN PT DEPT CALLS TO GET AUTH, [facility] DE[facility]..LD  Created                   

 

                                        [address] [place] [address], [place] [address]

 

                                        [phone]

## 2021-01-27 NOTE — CCD
Summarization of Episode Note

                             Created on: 2021



AMAN SHIN JULES

External Reference #: [id number]

: 1975

Sex: Male



Demographics





                          Address                   [address]

[place]  [address]

 

                          Home Phone                [phone]

 

                          Preferred Language        Unknown

 

                          Marital Status            Unknown

 

                          Advent Affiliation     Unknown

 

                          Race                      White

 

                          Ethnic Group              Not  or 





Author





                          Author                    [facility]

ems

 

                          Organization              [facility]

ems

 

                          Address                   Unknown

 

                          Phone                     Unavailable







Support





                Name            Relationship    Address         Phone

 

                    AMAN SHIN         GUAR                [address]

[place], [place]  [address] [phone]

 

                    Ana Shin          ECON                [address]

[place]  [address]                       [phone]







Care Team Providers





                    Care Team Member Name Role                Phone

 

                    Mirta Garay  Unavailable         [phone]







PROBLEMS





          Type      Condition ICD9-CM Code OPT61-NW Code Onset Dates Condition S

tatus SNOMED 

Code                                    Notes

 

        Problem Neurogenic bladder         N31.9           Active  839887758  

 

        Problem Body mass index (BMI) 34.0-34.9, adult         Z68.34          A

ctive  358891968  

 

        Problem Urge incontinence         N39.41          Active  33682855  

 

        Problem Exercise-induced asthma         J45.990         Active  32807563

  

 

                Problem         Gastroesophageal reflux disease, esophagitis pre

sence not specified                 

K21.9                           Active          [id number]       He is on omepraz

ole with control of his symptoms. He 

has seen a gastroenterologist in the past. No records available.

 

          Problem   Major depressive disorder, single episode, unspecified      

     F32.9               Active    

[id number]                                 

 

           Problem    Chronic obstructive pulmonary disease, unspecified COPD ty

pe            J44.9                 

Active                    85803282                  He apparently carries this d

iagnosis and is maintained on 

Spiriva and Breo. No active exacerbation. Unfortunately he is still smoking. No 
PFTs are readily available in the medical record. He has been advised to quit 
smoking.

 

        Problem Anxiety disorder, unspecified         F41.9           Active  23

4207032 He is on 

clonidine and high-dose fluoxetine. Symptoms are palliated.

 

        Problem Chronic kidney disease, stage 3 (moderate)         N18.3        

   Active  084782838 

Most recent lab work in 2019 was not remarkable.

 

        Problem Lumbar degenerative disc disease         M51.36          Active 

 04398100  

 

        Problem Insomnia due to other mental disorder         F51.05          Ac

tive  70155183  

 

        Problem Obesity, unspecified         E66.9           Active  292748394  

 

        Problem Feeling of incomplete bladder emptying         R39.14          A

ctive  398979762  

 

        Problem Mixed hyperlipidemia         E78.2           Active  649221676 T

reated by his primary 

provider with lovastatin. I cannot locate a recent lipid profile.

 

        Problem Post-traumatic stress disorder, unspecified         F43.10      

    Active  72586532  

 

        Problem Generalized anxiety disorder         F41.1           Active  218

58533  

 

          Problem   Major depressive disorder, recurrent, moderate           F33

.1               Active    

[id number]                                

 

        Problem Mental disorder, not otherwise specified         F99            

 Active  19355678  







ALLERGIES





                    Allergen (clinical drug ingredient) Drug/Non Drug Allergy do

cumented on EMR 

Reaction            Allergy Type        Onset Date          Status

 

                      Feathers   Unknown    Non Drug Allergy            Active

 

                      Effexor    Excessive Emotions Drug Allergy            Acti

ve

 

           escitalopram Lexapro(NDC Code:[address]-05) Fatigue    Drug Allergy 

           Active

 

                      Wellbutrin Aggressive Drug Allergy            Active







ENCOUNTERS from 1975 to 2021





             Encounter    Location     Date         Provider     Diagnosis

 

                [address] [address]     Mirta Garay                              Anxiety disorder, unspecified F41.9







IMMUNIZATIONS

No Information



SOCIAL HISTORY

Tobacco Use:



                    Social History Observation Description         Date

 

                    Details (start date - stop date) Current Smoker       



Sex Assigned At Birth:



                          Social History Observation Description

 

                          Sex Assigned At Birth     Unknown



Education:



                    Question            Answer              Notes

 

                    Level of Education: High School          



Audit



                    Question            Answer              Notes

 

                    Total Score:        1                    

 

                    Interpretation:     Alcohol Education    



Language:



                    Question            Answer              Notes

 

                    Languages spoken:   English              

 

                    Languages spoken:   English              



Yarsanism:



                    Question            Answer              Notes

 

                    Yarsanism                                No Bahai beliefs

 that would impact health care.

 

                    Yarsanism            21 Hinduism    



Sexual Hx:



                    Question            Answer              Notes

 

                    Had sex in the last 12 months (vaginal, oral, or anal)? Yes 

                 

 

                    Have you ever had an STD? No                   

 

                    with                Women only           

 

                    Use protection?     No                   



Drug and Alcohol



                    Question            Answer              Notes

 

                    Total Score:        0                    

 

                    Interpretation:     No problems reported  



Alcohol Screening:



                    Question            Answer              Notes

 

                    Did you have a drink containing alcohol in the past year? Ye

s                  

 

                    Points              3                    

 

                    Interpretation      Negative             

 

                          How often did you have six or more drinks on one occas

ion in the past year? 

Never (0 points)                         

 

                                        How many drinks did you have on a typica

l day when you were drinking in the past

year?                     1 or 2 (0 points)          

 

                          How often did you have a drink containing alcohol in t

he past year? Two to three

times per week (3 points)                



Tobacco Use:



                    Question            Answer              Notes

 

                    Are you a:          Uses tobacco in other forms SMOKE PIPE A

BOUT 10 CIGARETTES A DAY

 

                    Are you a:          current smoker       

 

                    Are you interested in quitting? Ready to quit        

 

                    Counseled the patient on tobacco use, cessation provided 10/

           







REASON FOR REFERRAL

No Information



VITAL SIGNS

No information



MEDICATIONS





           Medication SIG (Take, Route, Frequency, Duration) Notes      Start Da

te End Date   

Status

 

           Mucinex 600 MG 1 tab orally bid                                  Acti

ve

 

           FLUoxetine HCl 10 MG 1 capsule Orally Daily                          

        Active

 

           FLUoxetine HCl 40 MG 1 capsule orally Daily                          

        Active

 

           Clonidine HCl 0.2 MG 1 tablet Orally before bedtime            13 Dec

, 2019            Active

 

           Mirtazapine 7.5 MG tablets at bedtime Orally before bedtime          

                        Active

 

                          Ventolin  (90 Base) MCG/ACT 1 puff as needed In

halation every 4 hours as 

needed                                                          Active

 

           Incruse Ellipta 62.5 MCG/INH 1 puff Inhalation Once a day            

                      Active

 

           Omeprazole 40 MG 1 capsule Orally Daily                              

    Active

 

           HydrOXYzine HCl 10 MG 2 tabs Orally before bedtime                   

               Active

 

           Nystatin 036119 UNIT/GM as directed Externally B)Feet Once a day     

                             Active

 

           FLUoxetine HCl 40 MG 1 capsule Orally Once a day for 90 days         

               

Not-Taking

 

                Albuterol Sulfate (2.5 MG/3ML) 0.083% 1 vial Inhalation every 6 

hrs as needed                 

                                                    Active

 

           Breo Ellipta 200-25 MCG/INH 1 puff Inhalation Once a day             

                     Active

 

           Lovastatin 20 MG 1 tab orally Daily                                  

Active

 

           Acetaminophen 500 MG 1 capsule as needed Orally every 6 hrs          

  19 Dec, 2019            

Active







PROCEDURES

No Information



RESULTS

No Results



REASON FOR VISIT

New Refill Request



MEDICAL (GENERAL) HISTORY





                    Type                Description         Date

 

                    Medical History     COPD, EIB            

 

                    Medical History     Depression and anxiety  

 

                    Medical History     CKD3                 

 

                    Medical History     Hyperlipidemia       

 

                    Medical History     GERD                 

 

                    Surgical History    R)knee surgery      

 

                    Surgical History    L)knee surgery      

 

                    Surgical History    s/p B Inguinal Hernia Repair- Dr. Foster

2006

 

                    Surgical History    Inguinal Hernia Repair 

 

                    Surgical History    s/p REctal polyps removed Dr. Szymanski 



 

                    Surgical History    LEFT ELBOW PINCHED NERVE 

 

                    Surgical History    R) carpal tunnel repair 19

 

                    Surgical History    Cystoscopy apparently with Botox instill

ation?-Dr. Perez 

2019

 

                    Surgical History    Left carpal tunnel repair 6/3/2020

 

                    Surgical History    R)shoulder surgery debridement 2020







Goals Section

No Information



Health Concerns

No Information



MEDICAL EQUIPMENT

No Information



MENTAL STATUS

No Information



FUNCTIONAL STATUS

No Information



ASSESSMENTS





             Encounter Date Diagnosis    Assessment Notes Treatment Notes Treatm

ent Clinical 

Notes

 

                    Anxiety disorder, unspecified (ICD-10 - F41.9)  

               



                                        











PLAN OF TREATMENT

Medication



                Medication Name Sig             Start Date      Stop Date

 

                          Ventolin  (90 Base) MCG/ACT 1 puff as needed In

halation every 4 hours as 

needed                                               

 

                Breo Ellipta 200-25 MCG/INH 1 puff Inhalation Once a day        

          

 

                Mirtazapine 7.5 MG tablets at bedtime Orally before bedtime     

             

 

                Albuterol Sulfate (2.5 MG/3ML) 0.083% 1 vial Inhalation every 6 

hrs as needed                 



 

                Clonidine HCl 0.2 MG 1 tablet Orally before bedtime 13 Dec, 2019

     

 

                Acetaminophen 500 MG 1 capsule as needed Orally every 6 hrs 19 D

,      

 

                Incruse Ellipta 62.5 MCG/INH 1 puff Inhalation Once a day       

           

 

                Omeprazole 40 MG 1 capsule Orally Daily                  

 

                HydrOXYzine HCl 10 MG 2 tabs Orally before bedtime              

    

 

                FLUoxetine HCl 40 MG 1 capsule orally Daily                  

 

                FLUoxetine HCl 10 MG 1 capsule Orally Daily                  

 

                Lovastatin 20 MG 1 tab orally Daily                  



Next Appt



                                        Details

 

                                        Provider Name:Jose Garner, 2021 10:0

0:00 AM, [address] [place] [address], [phone]

 

                                        Provider Name:Mirta Garay,  10:45:00 AM, [address] [place] [address], [phone], [phone]







Insurance Providers





             Payer Name   Payer Address Payer Phone  Insured Name Patient Relati

onship to 

Insured                   Coverage Start Date       Coverage End Date

 

                [facility]         CORPORATE CLAIMS DEPT   [facility], [facility] [address]

[phone]    

AMAN SHIN        self

## 2021-01-27 NOTE — CCD
Continuity of Care Document (CCD)

                             Created on: 2021



Adrián Shin

External Reference #: [id number]

: 1975

Sex: Male



Demographics





                          Address                   [address] 

[place]  [address]

 

                          Home Phone                [phone]

 

                          Preferred Language        Unknown

 

                          Marital Status            Unknown

 

                          Islam Affiliation     Unknown

 

                          Race                      White

 

                          Ethnic Group              Not  or 





Author





                          Author                    Adrián VALENTIN DPT

 

                          Organization              Unknown

 

                          Address                   [address]  [address]



 

                          Phone                     [phone]







Care Team Providers





                    Care Team Member Name Role                Phone

 

                    Zachary Chakraborty MD      AUTM                [phone]

 

                    Jose Guerra MD AUTM                [phone]







Problems





                    Active Problems     Provider            Date

 

                    Pure hypercholesterolemia Henry Trivedi MD  Onset: 10/07/2

019







Social History





                Type            Date            Description     Comments

 

                Birth Sex                       Unknown          

 

                ETOH Use                        Occasionally consumes alcohol  

 

                Tobacco Use     Start: Unknown  Patient is a current smoker, smo

kes every day smokes 

a pipe 







Allergies, Adverse Reactions, Alerts





             Active Allergies Reaction     Severity     Comments     Date

 

             Effexor                                             10/07/2019

 

             Wellbutrin                                          10/07/2019

 

             Lexapro                                             10/07/2019







Medications





           Active Medications SIG        Qnty       Indications Ordering Provide

r Date

 

                          Tramadol HCL                     50mg Tablets         

          1 tab po every 

4-6 hours as needed pain after surgery(please do not fill until 6/3/2020). 

10tabs                                  Henry Trivedi MD  2020

 

                          Ventolin HFA                     108(90Base) mcg/Act A

erosol                    

                                                Henry Trivedi MD 2019

 

             Breo Ellipta                     200-25mcg/Inh Aerosol             

                                             

Unknown                                 

 

                Spiriva Respimat                     1.25mcg/Act Aerosol        

                                            

                          Unknown                   

 

                          Fluoxetine HCL                     40mg Capsules      

             1 by mouth 

every day                                       Unknown         

 

           Omeprazole                     40mg Capsules DR                      

                              Unknown    



 

                                        Albuterol Sulfate HFA                   

  108(90Base) mcg/Act Aerosol           

                                                    Unknown      

 

           Trazodone HCL                     50mg Tablets                       

                             Unknown    



 

             Clonidine                     0.3mg/24HR Patches Weekly            

                                              

Unknown                                 

 

             Hydroxyzine HCL                     10mg Tablets                   

                                       Unknown

                                        

 

                    Lovastatin                     20mg Tablets                 

  1 by mouth a day     

                                        Unknown             







Immunizations





                                        Description

 

                                        No Information Available







Vital Signs





                Date            Vital           Result          Comment

 

                2021  4:35pm Body Temperature 96.8 F         

 

                    Height              65 inches           5'5"

 

                    Weight              191.00 lb            

 

                    BMI (Body Mass Index) 31.8 kg/m2           

 

                2019  2:32pm Body Temperature 97.7 F         







Results





                                        Description

 

                                        No Information Available







Procedures





                Date            Code            Description     Status

 

                2021      17737           X-Ray Spine Lumbosacral Complete

 Inc Bending Views Min Of 6 

Completed

 

                2020      46915           Therapeutic Procedure, Each 15 M

inutes Completed

 

                2020      23572           Therapeutic Procedure, Each 15 M

inutes Completed

 

                2020      48064           Therapeutic Procedure, Each 15 M

inutes Completed

 

                2020      29596           Hot Or Cold Packs Completed

 

                2020      44142           Therapeutic Procedure, Each 15 M

inutes Completed

 

                2020      17352           Therapeutic Procedure, Each 15 M

inutes Completed

 

                2020      30620           Therapeutic Procedure, Each 15 M

inutes Completed

 

                2020      25529           Therapeutic Procedure, Each 15 M

inutes Completed

 

                2020      80122           Hot Or Cold Packs Completed

 

                2020      51341           Therapeutic Procedure, Each 15 M

inutes Completed

 

                2020      19898           X-Ray Shoulder Complete Complete

d

 

                2020      86213           Therapeutic Procedure, Each 15 M

inutes Completed

 

                2020      45599           Hot Or Cold Packs Completed

 

                2020      70778           Therapeutic Procedure, Each 15 M

inutes Completed

 

                10/30/2020      01043           Therapeutic Procedure, Each 15 M

inutes Completed

 

                10/30/2020      60518           Hot Or Cold Packs Completed

 

                    10/07/2020          02722               Re-Eval Of PT Establ

ished Plan Of Care 20Mins Face To Face 

PT/Fam                                  Completed

 

                10/07/2020      16937           Therapeutic Procedure, Each 15 M

inutes Completed

 

                10/02/2020      10027           Therapeutic Procedure, Each 15 M

inutes Completed

 

                2020      20620           Therapeutic Procedure, Each 15 M

inutes Completed

 

                2020      36653           Therapeutic Procedure, Each 15 M

inutes Completed

 

                2020      39997           Hot Or Cold Packs Completed

 

                2020      93458           Therapeutic Procedure, Each 15 M

inutes Completed

 

                2020      64898           Hot Or Cold Packs Completed

 

                2020      40717           Therapeutic Procedure, Each 15 M

inutes Completed

 

                2020      72821           Hot Or Cold Packs Completed

 

                2020      64609           Therapeutic Procedure, Each 15 M

inutes Completed

 

                2020      86996           Hot Or Cold Packs Completed

 

                2020      10359           Therapeutic Procedure, Each 15 M

inutes Completed

 

                    2020          90454               Re-Eval Of PT Establ

ished Plan Of Care 20Mins Face To Face 

PT/Fam                                  Completed

 

                2020      81204           Therapeutic Procedure, Each 15 M

inutes Completed

 

                2020      22142           Therapeutic Procedure, Each 15 M

inutes Completed

 

                2020      54842           Hot Or Cold Packs Completed

 

                2020      13423           Therapeutic Procedure, Each 15 M

inutes Completed

 

                2020      77019           Hot Or Cold Packs Completed

 

                2020      89315           Therapeutic Procedure, Each 15 M

inutes Completed

 

                2020      23432           Hot Or Cold Packs Completed

 

                2020      46385           Physical Therapy Eval - Low Comp

lexity Completed







Medical Devices





                                        Description

 

                                        No Information Available







Encounters





                                        Description

 

                                        No Information Available







Assessments





                Date            Code            Description     Provider

 

                2021      M47.896         Other spondylosis, lumbar region

 Henry Trivedi MD

 

                2020      Z47.89          Encounter for other orthopedic a

ftercare Lars Valentin, PT, 

DPT

 

                2020      Z47.89          Encounter for other orthopedic a

ftercare Danamarie Ortolano, 

PTA

 

                2020      Z47.89          Encounter for other orthopedic a

ftercare Celena Alvarado, PTA

 

                2020      Z47.89          Encounter for other orthopedic a

ftercare Danamarie Ortolano, 

PTA

 

                2020      M25.512         Pain in left shoulder Celena Steel

e, PTA

 

                2020      M25.512         Pain in left shoulder Celena Steel

e, PTA

 

                2020      M25.512         Pain in left shoulder Lars dukes, PT, DPT

 

                2020      M25.512         Pain in left shoulder Celena Steel

e, PTA

 

                2020      M25.512         Pain in left shoulder Sergo Sigala MD

 

                2020      Z47.89          Encounter for other orthopedic a

ftercare Celena Alvarado, PTA

 

                2020      Z47.89          Encounter for other orthopedic a

ftercare Stacey Scee P.T.A.

 

                10/30/2020      Z47.89          Encounter for other orthopedic a

ftercare Celena Alvarado, PTA

 

                10/07/2020      Z47.89          Encounter for other orthopedic a

ftercare Lars HEATON [place]anna, PT, 

DPT

 

                10/02/2020      Z47.89          Encounter for other orthopedic a

ftercare Celena Alvarado, PTA

 

                2020      Z47.89          Encounter for other orthopedic a

ftercare Lars HEATON [place], PT, 

DPT

 

                2020      Z47.89          Encounter for other orthopedic a

ftercare Stacey Scee P.T.A.

 

                2020      Z47.89          Encounter for other orthopedic a

ftercare Stacey Scee P.T.A.

 

                2020      Z47.89          Encounter for other orthopedic a

ftercare Stacey Scee P.T.A.

 

                2020      Z47.89          Encounter for other orthopedic a

ftercare Lars Valentin, PT, 

DPT

 

                2020      Z47.89          Encounter for other orthopedic a

ftercare Stacey Scee P.T.A.

 

                2020      Z47.89          Encounter for other orthopedic a

ftercare Lars MIGUELKirsten Valentin, PT, 

DPT

 

                2020      Z47.89          Encounter for other orthopedic a

ftercare Stacey Scee P.T.A.

 

                2020      Z47.89          Encounter for other orthopedic a

ftercare Stacey Scee P.T.A.

 

                2020      Z47.89          Encounter for other orthopedic a

ftercare Celena Alvarado, PTA

 

                2020      Z47.89          Encounter for other orthopedic a

ftercare Lars MIGUELKirsten Valentin, PT, 

DPT







Plan of Treatment





No Information Available



Functional Status





                                        Description

 

                                        No Information Available







Mental Status





                                        Description

 

                                        No Information Available







Referrals





                Refer to Dr     Reason for Referral Status          Appt Date

 

                Henry Trivedi MD PT ALLOWED EVAL THEN NEEDS AUTH TO PT DEPT. N

T  Created         



 

                                        [facility], [facility] [address]

 

                                        [place] [address]

 

                                        [phone]

 

                                          

 

                Henry Trivedi MD PT ALLOWED EVAL THEN NEEDS AUTH TO PT DEPT. N

T  Created         



 

                                        [address] [place] [address], [address] [place], [place] [address] [phone]

 

                                          

 

                          Henry Trivedi MD        PT - 8 VISITS [facility]

HLDRS FROM 10/19-20, AUTH 

#[id number], TRACKING #[id number]. SS  Created                   

 

                                        [address] [place] [address], [address] [address] [phone]

## 2021-01-27 NOTE — CCD
Continuity of Care Document (CCD)

                             Created on: 2021



Adrián Shin

External Reference #: [id number]

: 1975

Sex: Male



Demographics





                          Address                   [address] 

[place]  [address]

 

                          Home Phone                [phone]

 

                          Preferred Language        Unknown

 

                          Marital Status            Unknown

 

                          Sikh Affiliation     Unknown

 

                          Race                      White

 

                          Ethnic Group              Not  or 





Author





                          Author                    Adrián MOTA MD

 

                          Organization              Unknown

 

                          Address                   [address]

[place]  [address]



 

                          Phone                     [phone]







Care Team Providers





                    Care Team Member Name Role                Phone

 

                    Zachary Chakraborty MD      AUTM                [phone]

 

                    Jose Guerra MD AUTM                [phone]







Problems





                    Active Problems     Provider            Date

 

                    Pure hypercholesterolemia Henry Mota MD  Onset: 10/07/2

019







Social History





                Type            Date            Description     Comments

 

                Birth Sex                       Unknown          

 

                ETOH Use                        Occasionally consumes alcohol  

 

                Tobacco Use     Start: Unknown  Patient is a current smoker, smo

kes every day smokes 

a pipe 







Allergies, Adverse Reactions, Alerts





             Active Allergies Reaction     Severity     Comments     Date

 

             Effexor                                             10/07/2019

 

             Wellbutrin                                          10/07/2019

 

             Lexapro                                             10/07/2019







Medications





           Active Medications SIG        Qnty       Indications Ordering Provide

r Date

 

                          Tramadol HCL                     50mg Tablets         

          1 tab po every 

4-6 hours as needed pain after surgery(please do not fill until 6/3/2020). 

10tabs                                  Henry Mota MD  2020

 

                          Ventolin HFA                     108(90Base) mcg/Act A

erosol                    

                                                Henry Mota MD 2019

 

             Breo Ellipta                     200-25mcg/Inh Aerosol             

                                             

Unknown                                 

 

                Spiriva Respimat                     1.25mcg/Act Aerosol        

                                            

                          Unknown                   

 

                          Fluoxetine HCL                     40mg Capsules      

             1 by mouth 

every day                                       Unknown         

 

           Omeprazole                     40mg Capsules DR                      

                              Unknown    



 

                                        Albuterol Sulfate HFA                   

  108(90Base) mcg/Act Aerosol           

                                                    Unknown      

 

           Trazodone HCL                     50mg Tablets                       

                             Unknown    



 

             Clonidine                     0.3mg/24HR Patches Weekly            

                                              

Unknown                                 

 

             Hydroxyzine HCL                     10mg Tablets                   

                                       Unknown

                                        

 

                    Lovastatin                     20mg Tablets                 

  1 by mouth a day     

                                        Unknown             







Immunizations





                                        Description

 

                                        No Information Available







Vital Signs





                Date            Vital           Result          Comment

 

                2021  4:35pm Body Temperature 96.8 F         

 

                    Height              65 inches           5'5"

 

                    Weight              191.00 lb            

 

                    BMI (Body Mass Index) 31.8 kg/m2           

 

                2019  2:32pm Body Temperature 97.7 F         







Results





                                        Description

 

                                        No Information Available







Procedures





                Date            Code            Description     Status

 

                2021      87754           X-Ray Spine Lumbosacral Complete

 Inc Bending Views Min Of 6 

Completed

 

                2020      99480           Therapeutic Procedure, Each 15 M

inutes Completed

 

                2020      53956           Therapeutic Procedure, Each 15 M

inutes Completed

 

                2020      95540           Therapeutic Procedure, Each 15 M

inutes Completed

 

                2020      57182           Hot Or Cold Packs Completed

 

                2020      47696           Therapeutic Procedure, Each 15 M

inutes Completed

 

                2020      60689           Therapeutic Procedure, Each 15 M

inutes Completed

 

                2020      84508           Therapeutic Procedure, Each 15 M

inutes Completed

 

                2020      17148           Therapeutic Procedure, Each 15 M

inutes Completed

 

                2020      51911           Hot Or Cold Packs Completed

 

                2020      61437           Therapeutic Procedure, Each 15 M

inutes Completed

 

                2020      83727           X-Ray Shoulder Complete Complete

d

 

                2020      75446           Therapeutic Procedure, Each 15 M

inutes Completed

 

                2020      06571           Hot Or Cold Packs Completed

 

                2020      84206           Therapeutic Procedure, Each 15 M

inutes Completed

 

                10/30/2020      23813           Therapeutic Procedure, Each 15 M

inutes Completed

 

                10/30/2020      94321           Hot Or Cold Packs Completed

 

                    10/07/2020          63471               Re-Eval Of PT Establ

ished Plan Of Care 20Mins Face To Face 

PT/Fam                                  Completed

 

                10/07/2020      51720           Therapeutic Procedure, Each 15 M

inutes Completed

 

                10/02/2020      90595           Therapeutic Procedure, Each 15 M

inutes Completed

 

                2020      33627           Therapeutic Procedure, Each 15 M

inutes Completed

 

                2020      63819           Therapeutic Procedure, Each 15 M

inutes Completed

 

                2020      05052           Hot Or Cold Packs Completed

 

                2020      69624           Therapeutic Procedure, Each 15 M

inutes Completed

 

                2020      41834           Hot Or Cold Packs Completed

 

                2020      91859           Therapeutic Procedure, Each 15 M

inutes Completed

 

                2020      52352           Hot Or Cold Packs Completed

 

                2020      85293           Therapeutic Procedure, Each 15 M

inutes Completed

 

                2020      46693           Hot Or Cold Packs Completed

 

                2020      25465           Therapeutic Procedure, Each 15 M

inutes Completed

 

                    2020          20581               Re-Eval Of PT Establ

ished Plan Of Care 20Mins Face To Face 

PT/Fam                                  Completed

 

                2020      50372           Therapeutic Procedure, Each 15 M

inutes Completed

 

                2020      77303           Therapeutic Procedure, Each 15 M

inutes Completed

 

                2020      67377           Hot Or Cold Packs Completed

 

                2020      92286           Therapeutic Procedure, Each 15 M

inutes Completed

 

                2020      95889           Hot Or Cold Packs Completed

 

                2020      46909           Therapeutic Procedure, Each 15 M

inutes Completed

 

                2020      19619           Hot Or Cold Packs Completed

 

                2020      71552           Physical Therapy Eval - Low Comp

lexity Completed







Medical Devices





                                        Description

 

                                        No Information Available







Encounters





                                        Description

 

                                        No Information Available







Assessments





                Date            Code            Description     Provider

 

                2021      M47.896         Other spondylosis, lumbar region

 Henry Mota MD

 

                2020      Z47.89          Encounter for other orthopedic a

ftercare Lars Huerta, PT, 

DPT

 

                2020      Z47.89          Encounter for other orthopedic a

ftercare Danamarie Ortolano, 

PTA

 

                2020      Z47.89          Encounter for other orthopedic a

ftercare Celena Alvarado, PTA

 

                2020      Z47.89          Encounter for other orthopedic a

ftercare Danamarie Ortolano, 

PTA

 

                2020      M25.512         Pain in left shoulder Celena Steel

e, PTA

 

                2020      M25.512         Pain in left shoulder Celena Steel

e, PTA

 

                2020      M25.512         Pain in left shoulder Lars dukes, PT, DPT

 

                2020      M25.512         Pain in left shoulder Celena Steel

e, PTA

 

                2020      M25.512         Pain in left shoulder Sergo Sigala MD

 

                2020      Z47.89          Encounter for other orthopedic a

ftercare Celena Alvarado, PTA

 

                2020      Z47.89          Encounter for other orthopedic a

ftercare Stacey Scee P.T.A.

 

                10/30/2020      Z47.89          Encounter for other orthopedic a

ftercare Celena Alvarado, PTA

 

                10/07/2020      Z47.89          Encounter for other orthopedic a

ftercare Lars HEATON Deanna, PT, 

DPT

 

                10/02/2020      Z47.89          Encounter for other orthopedic a

ftercare Celena Alvarado, PTA

 

                2020      Z47.89          Encounter for other orthopedic a

ftercare Lars HEATON Deanna, PT, 

DPT

 

                2020      Z47.89          Encounter for other orthopedic a

ftercare Stacey Scee P.T.A.

 

                2020      Z47.89          Encounter for other orthopedic a

ftercare Stacey Scee P.T.A.

 

                2020      Z47.89          Encounter for other orthopedic a

ftercare Stacey Scee P.T.A.

 

                2020      Z47.89          Encounter for other orthopedic a

ftercare Lars RIVERAKirstne Huerta, PT, 

DPT

 

                2020      Z47.89          Encounter for other orthopedic a

ftercare Stacey Scee P.T.A.

 

                2020      Z47.89          Encounter for other orthopedic a

ftercare Lars HEATON Deanna, PT, 

DPT

 

                2020      Z47.89          Encounter for other orthopedic a

ftercare Stacey Scee P.T.A.

 

                2020      Z47.89          Encounter for other orthopedic a

ftercare Stacey Scee P.T.A.

 

                2020      Z47.89          Encounter for other orthopedic a

ftercare Celena Alvarado, PTA

 

                2020      Z47.89          Encounter for other orthopedic a

ftercare Lar[place]ia Huerta, PT, 

DPT







Plan of Treatment

2021 - Henry Mota MD* M47.896 Other spondylosis, lumbar region* 
  Follow up:* 2 months back recheck









Functional Status





                                        Description

 

                                        No Information Available







Mental Status





                                        Description

 

                                        No Information Available







Referrals





                Refer to      Reason for Referral Status          Appt Date

 

                Henry Mota MD PT ALLOWED EVAL THEN NEEDS AUTH TO PT DEPT. N

T  Created         



 

                                        [facility] [facility], [address]

 

                                        [place] [address] [phone]

 

                                          

 

                          Henry Mota MD        PT - 8 VISITS [facility]

HLDRS FROM 10/19-20, AUTH 

#[id number], TRACKING #[id number]. SS  Created                   

 

                                        [address] [place] [address], [address]

 

                                        [place] [address] [phone]

## 2021-01-27 NOTE — CCD
Continuity of Care Document (CCD)

                             Created on: 2020



Adrián Shin

External Reference #: [id number]

: 1975

Sex: Male



Demographics





                          Address                   [address]

[place]  [address]

 

                          Home Phone                [phone]

 

                          Preferred Language        Unknown

 

                          Marital Status            Unknown

 

                          Catholic Affiliation     Unknown

 

                          Race                      White

 

                          Ethnic Group              Not  or 





Author





                          Author                    Adrián VALENTIN DPT

 

                          Organization              Unknown

 

                          Address                   [address] [place] [address] [address]

[place]  [address]



 

                          Phone                     [phone]







Care Team Providers





                    Care Team Member Name Role                Phone

 

                    Wendi Martinez MD AUTM                [phone]

 

                    Zachary Chakraborty MD      AUTM                [phone]







Problems





                    Active Problems     Provider            Date

 

                    Pure hypercholesterolemia Henry Trivedi MD  Onset: 10/07/2

019







Social History





                Type            Date            Description     Comments

 

                Birth Sex                       Unknown          

 

                ETOH Use                        Occasionally consumes alcohol  

 

                Tobacco Use     Start: Unknown  Patient is a current smoker, smo

kes every day smokes 

a pipe 







Allergies, Adverse Reactions, Alerts





             Active Allergies Reaction     Severity     Comments     Date

 

             Effexor                                             10/07/2019

 

             Wellbutrin                                          10/07/2019

 

             Lexapro                                             10/07/2019







Medications





           Active Medications SIG        Qnty       Indications Ordering Provide

r Date

 

                          Tramadol HCL                     50mg Tablets         

          1 tab po every 

4-6 hours as needed pain after surgery(please do not fill until 6/3/2020). 

10tabs                                  Henry Trivedi MD  2020

 

                          Ventolin HFA                     108(90Base) mcg/Act A

erosol                    

                                                Henry Trivedi MD 2019

 

             Breo Ellipta                     200-25mcg/Inh Aerosol             

                                             

Unknown                                 

 

                Spiriva Respimat                     1.25mcg/Act Aerosol        

                                            

                          Unknown                   

 

                          Fluoxetine HCL                     40mg Capsules      

             1 by mouth 

every day                                       Unknown         

 

           Omeprazole                     40mg Capsules DR                      

                              Unknown    



 

                                        Albuterol Sulfate HFA                   

  108(90Base) mcg/Act Aerosol           

                                                    Unknown      

 

           Trazodone HCL                     50mg Tablets                       

                             Unknown    



 

             Clonidine                     0.3mg/24HR Patches Weekly            

                                              

Unknown                                 

 

             Hydroxyzine HCL                     10mg Tablets                   

                                       Unknown

                                        

 

                    Lovastatin                     20mg Tablets                 

  1 by mouth a day     

                                        Unknown             







Immunizations





                                        Description

 

                                        No Information Available







Vital Signs





                Date            Vital           Result          Comment

 

                2019  2:32pm Body Temperature 97.7 F         

 

                2019 10:35am Body Temperature 97.7 F         

 

                    Height              65.5 inches         5'5.50"

 

                    Weight              188.00 lb            

 

                    BMI (Body Mass Index) 30.8 kg/m2           







Results





                                        Description

 

                                        No Information Available







Procedures





                Date            Code            Description     Status

 

                2020      02184           Therapeutic Procedure, Each 15 M

inutes Completed

 

                2020      77113           Therapeutic Procedure, Each 15 M

inutes Completed

 

                2020      02871           Hot Or Cold Packs Completed

 

                2020      73597           Therapeutic Procedure, Each 15 M

inutes Completed

 

                2020      28740           Therapeutic Procedure, Each 15 M

inutes Completed

 

                2020      48516           Therapeutic Procedure, Each 15 M

inutes Completed

 

                2020      02166           Therapeutic Procedure, Each 15 M

inutes Completed

 

                2020      93466           Therapeutic Procedure, Each 15 M

inutes Completed

 

                2020      39091           Hot Or Cold Packs Completed

 

                2020      56903           X-Ray Shoulder Complete Complete

d

 

                2020      38755           Hot Or Cold Packs Completed

 

                2020      35020           Therapeutic Procedure, Each 15 M

inutes Completed

 

                2020      50053           Therapeutic Procedure, Each 15 M

inutes Completed

 

                10/30/2020      90408           Therapeutic Procedure, Each 15 M

inutes Completed

 

                10/30/2020      02933           Hot Or Cold Packs Completed

 

                    10/07/2020          50378               Re-Eval Of PT Establ

ished Plan Of Care 20Mins Face To Face 

PT/Fam                                  Completed

 

                10/07/2020      09158           Therapeutic Procedure, Each 15 M

inutes Completed

 

                10/02/2020      60778           Therapeutic Procedure, Each 15 M

inutes Completed

 

                2020      33561           Therapeutic Procedure, Each 15 M

inutes Completed

 

                2020      58578           Hot Or Cold Packs Completed

 

                2020      24614           Therapeutic Procedure, Each 15 M

inutes Completed

 

                2020      67521           Therapeutic Procedure, Each 15 M

inutes Completed

 

                2020      68498           Hot Or Cold Packs Completed

 

                2020      03636           Therapeutic Procedure, Each 15 M

inutes Completed

 

                2020      51259           Hot Or Cold Packs Completed

 

                2020      83956           Therapeutic Procedure, Each 15 M

inutes Completed

 

                2020      33054           Therapeutic Procedure, Each 15 M

inutes Completed

 

                2020      26440           Hot Or Cold Packs Completed

 

                2020      43615           Therapeutic Procedure, Each 15 M

inutes Completed

 

                    2020          11867               Re-Eval Of PT Establ

ished Plan Of Care 20Mins Face To Face 

PT/Fam                                  Completed

 

                2020      94274           Therapeutic Procedure, Each 15 M

inutes Completed

 

                2020      29201           Hot Or Cold Packs Completed

 

                2020      59420           Therapeutic Procedure, Each 15 M

inutes Completed

 

                2020      38051           Hot Or Cold Packs Completed

 

                2020      10725           Therapeutic Procedure, Each 15 M

inutes Completed

 

                2020      14371           Hot Or Cold Packs Completed

 

                2020      03885           Physical Therapy Eval - Low Comp

lexity Completed

 

                2020      14247           Arthroscopy Shoulder Decompress 

Subacromial Part Acromioplasty 

Completed

 

                    2020          45515               Arthroscopy Shoulder

 Removal Loose/Foreign Body Dist 

Claviculecto                            Completed







Medical Devices





                                        Description

 

                                        No Information Available







Encounters





                                        Description

 

                                        No Information Available







Assessments





                Date            Code            Description     Provider

 

                2020      Z47.89          Encounter for other orthopedic a

ftercare Danamarie Ortolano, 

PTA

 

                2020      Z47.89          Encounter for other orthopedic a

ftercare Celena Alvarado, PTA

 

                2020      Z47.89          Encounter for other orthopedic a

ftercare Danamarie Ortolano, 

PTA

 

                2020      M25.512         Pain in left shoulder Celena Steel

e, PTA

 

                2020      M25.512         Pain in left shoulder Celena Steel

e, PTA

 

                2020      M25.512         Pain in left shoulder Lars dukes, PT, DPT

 

                2020      M25.512         Pain in left shoulder Celena Steel

e, PTA

 

                2020      M25.512         Pain in left shoulder Sergo Sigala MD

 

                2020      Z47.89          Encounter for other orthopedic a

ftercare Celena Alvarado, PTA

 

                2020      Z47.89          Encounter for other orthopedic a

ftercare Stacey Parker P.T.A.

 

                10/30/2020      Z47.89          Encounter for other orthopedic a

ftercare Celena Alvarado, PTA

 

                10/07/2020      Z47.89          Encounter for other orthopedic a

ftercare Lars HEATON Deanna, PT, 

DPT

 

                10/02/2020      Z47.89          Encounter for other orthopedic a

ftercare Celena Alvarado, PTA

 

                2020      Z47.89          Encounter for other orthopedic a

ftercare Lars HEATON Deanna, PT, 

DPT

 

                2020      Z47.89          Encounter for other orthopedic a

ftercare Stacey Scee P.T.A.

 

                2020      Z47.89          Encounter for other orthopedic a

ftercare Stacey Scee P.T.A.

 

                2020      Z47.89          Encounter for other orthopedic a

ftercare Stacey Scee P.T.A.

 

                2020      Z47.89          Encounter for other orthopedic a

ftercare Lars HEATON Deanna, PT, 

DPT

 

                2020      Z47.89          Encounter for other orthopedic a

ftercare Stacey Scee P.T.A.

 

                2020      Z47.89          Encounter for other orthopedic a

ftercare Lars HEATON Deanna, PT, 

DPT

 

                2020      Z47.89          Encounter for other orthopedic a

ftercare Stacey Scee P.T.A.

 

                2020      Z47.89          Encounter for other orthopedic a

ftercare Stacey Scee P.T.A.

 

                2020      Z47.89          Encounter for other orthopedic a

ftercare Celena Alvarado, PTA

 

                2020      Z47.89          Encounter for other orthopedic a

ftercare Lars RIVERAKirsten Valentin, PT, 

DPT

 

                    2020          M75.112             Incomplete rotator c

uff tear or rupture of left shoulder, not

specified as traumatic                  Giancarlo Real PA-C

 

                2020      M75.42          Impingement syndrome of left taqueria

wilfredo Real PA-C

 

                2020      M19.012         Primary osteoarthritis, left taqueria

wilfredo Real PA-C







Plan of Treatment

Future Appointment(s):* 2021  4:00 pm - Henry Trivedi MD at [facility]





Functional Status





                                        Description

 

                                        No Information Available







Mental Status





                                        Description

 

                                        No Information Available







Referrals





                Refer to Dr     Reason for Referral Status          Appt Date

 

                Henry Trivedi MD PT ALLOWED EVAL THEN NEEDS AUTH TO PT DEPT. N

T  Created         



 

                                        [address] [place] [address], [address] [place] [address]

 

                                        [phone]

 

                                          

 

                          Henry Trivedi MD        PT - 8 VISITS [facility]

HLDRS FROM 10/19-20, AUTH 

#[id number], TRACKING #[id number]. SS  Created                   

 

                                        [address] [place] [address], [place] [address]

 

                                        [phone]

## 2021-01-27 NOTE — CCD
Continuity of Care Document (CCD)

                             Created on: 12/15/2020



Adrián Shin

External Reference #: [id number]

: 1975

Sex: Male



Demographics





                          Address                   [address]

[place]  [address]

 

                          Home Phone                [phone]

 

                          Preferred Language        Unknown

 

                          Marital Status            Unknown

 

                          Mandaen Affiliation     Unknown

 

                          Race                      White

 

                          Ethnic Group              Not  or 





Author





                          Author                    Adrián SEWELL [facility]

 

                          Organization              Unknown

 

                          Address                   [address] [place] [address]  [address]



 

                          Phone                     [phone]







Care Team Providers





                    Care Team Member Name Role                Phone

 

                    Wendi Martinez MD AUTM                [phone]

 

                    Zachary Chakraborty MD      AUTM                [phone]







Problems





                    Active Problems     Provider            Date

 

                    Pure hypercholesterolemia Henry Trivedi MD  Onset: 10/07/2

019







Social History





                Type            Date            Description     Comments

 

                Birth Sex                       Unknown          

 

                ETOH Use                        Occasionally consumes alcohol  

 

                Tobacco Use     Start: Unknown  Patient is a current smoker, smo

kes every day smokes 

a pipe 







Allergies, Adverse Reactions, Alerts





             Active Allergies Reaction     Severity     Comments     Date

 

             Effexor                                             10/07/2019

 

             Wellbutrin                                          10/07/2019

 

             Lexapro                                             10/07/2019







Medications





           Active Medications SIG        Qnty       Indications Ordering Provide

r Date

 

                          Tramadol HCL                     50mg Tablets         

          1 tab po every 

4-6 hours as needed pain after surgery(please do not fill until 6/3/2020). 

10tabs                                  Henry Trivedi MD  2020

 

                          Ventolin HFA                     108(90Base) mcg/Act A

erosol                    

                                                Henry Trivedi MD 2019

 

             Breo Ellipta                     200-25mcg/Inh Aerosol             

                                             

Unknown                                 

 

                Spiriva Respimat                     1.25mcg/Act Aerosol        

                                            

                          Unknown                   

 

                          Fluoxetine HCL                     40mg Capsules      

             1 by mouth 

every day                                       Unknown         

 

           Omeprazole                     40mg Capsules DR                      

                              Unknown    



 

                                        Albuterol Sulfate HFA                   

  108(90Base) mcg/Act Aerosol           

                                                    Unknown      

 

           Trazodone HCL                     50mg Tablets                       

                             Unknown    



 

             Clonidine                     0.3mg/24HR Patches Weekly            

                                              

Unknown                                 

 

             Hydroxyzine HCL                     10mg Tablets                   

                                       Unknown

                                        

 

                    Lovastatin                     20mg Tablets                 

  1 by mouth a day     

                                        Unknown             







Immunizations





                                        Description

 

                                        No Information Available







Vital Signs





                Date            Vital           Result          Comment

 

                2019  2:32pm Body Temperature 97.7 F         

 

                2019 10:35am Body Temperature 97.7 F         

 

                    Height              65.5 inches         5'5.50"

 

                    Weight              188.00 lb            

 

                    BMI (Body Mass Index) 30.8 kg/m2           







Results





                                        Description

 

                                        No Information Available







Procedures





                Date            Code            Description     Status

 

                2020      43371           Therapeutic Procedure, Each 15 M

inutes Completed

 

                2020      16991           Hot Or Cold Packs Completed

 

                2020      03167           Therapeutic Procedure, Each 15 M

inutes Completed

 

                2020      89072           Therapeutic Procedure, Each 15 M

inutes Completed

 

                2020      70651           Therapeutic Procedure, Each 15 M

inutes Completed

 

                2020      61196           Therapeutic Procedure, Each 15 M

inutes Completed

 

                2020      54622           Therapeutic Procedure, Each 15 M

inutes Completed

 

                2020      22928           Hot Or Cold Packs Completed

 

                2020      95971           X-Ray Shoulder Complete Complete

d

 

                2020      24405           Hot Or Cold Packs Completed

 

                2020      35890           Therapeutic Procedure, Each 15 M

inutes Completed

 

                2020      24765           Therapeutic Procedure, Each 15 M

inutes Completed

 

                10/30/2020      92163           Therapeutic Procedure, Each 15 M

inutes Completed

 

                10/30/2020      87380           Hot Or Cold Packs Completed

 

                    10/07/2020          57262               Re-Eval Of PT Establ

ished Plan Of Care 20Mins Face To Face 

PT/Fam                                  Completed

 

                10/07/2020      84760           Therapeutic Procedure, Each 15 M

inutes Completed

 

                10/02/2020      99123           Therapeutic Procedure, Each 15 M

inutes Completed

 

                2020      76419           Therapeutic Procedure, Each 15 M

inutes Completed

 

                2020      27358           Hot Or Cold Packs Completed

 

                2020      88371           Therapeutic Procedure, Each 15 M

inutes Completed

 

                2020      69181           Therapeutic Procedure, Each 15 M

inutes Completed

 

                2020      42846           Hot Or Cold Packs Completed

 

                2020      19620           Therapeutic Procedure, Each 15 M

inutes Completed

 

                2020      88057           Hot Or Cold Packs Completed

 

                2020      21504           Therapeutic Procedure, Each 15 M

inutes Completed

 

                2020      27505           Therapeutic Procedure, Each 15 M

inutes Completed

 

                2020      91316           Hot Or Cold Packs Completed

 

                2020      12383           Therapeutic Procedure, Each 15 M

inutes Completed

 

                    2020          36429               Re-Eval Of PT Establ

ished Plan Of Care 20Mins Face To Face 

PT/Fam                                  Completed

 

                2020      59655           Therapeutic Procedure, Each 15 M

inutes Completed

 

                2020      86664           Hot Or Cold Packs Completed

 

                2020      73152           Therapeutic Procedure, Each 15 M

inutes Completed

 

                2020      65106           Hot Or Cold Packs Completed

 

                2020      46069           Therapeutic Procedure, Each 15 M

inutes Completed

 

                2020      38985           Hot Or Cold Packs Completed

 

                2020      28771           Physical Therapy Eval - Low Comp

lexity Completed

 

                2020      38209           Arthroscopy Shoulder Decompress 

Subacromial Part Acromioplasty 

Completed

 

                    2020          67459               Arthroscopy Shoulder

 Removal Loose/Foreign Body Dist 

Claviculecto                            Completed







Medical Devices





                                        Description

 

                                        No Information Available







Encounters





                                        Description

 

                                        No Information Available







Assessments





                Date            Code            Description     Provider

 

                2020      Z47.89          Encounter for other orthopedic a

ftercare Celena Salinase, PTA

 

                2020      Z47.89          Encounter for other orthopedic a

ftercare Sosa Sewell, 

PTA

 

                2020      M25.512         Pain in left shoulder Celena Steel

e, PTA

 

                2020      M25.512         Pain in left shoulder Celena Steel

e, PTA

 

                2020      M25.512         Pain in left shoulder Lars dukes, PT, DPT

 

                2020      M25.512         Pain in left shoulder Celena Steel

e, PTA

 

                2020      M25.512         Pain in left shoulder Sergo Sigala MD

 

                2020      Z47.89          Encounter for other orthopedic a

ftercare Celena Alvarado, PTA

 

                2020      Z47.89          Encounter for other orthopedic a

ftercare Stacey Parker P.T.A.

 

                10/30/2020      Z47.89          Encounter for other orthopedic a

ftercare Celena Alvarado, PTA

 

                10/07/2020      Z47.89          Encounter for other orthopedic a

ftercare Lars Huerta, PT, 

DPT

 

                10/02/2020      Z47.89          Encounter for other orthopedic a

ftercare Celena Alvarado, PTA

 

                2020      Z47.89          Encounter for other orthopedic a

ftercare Lars RIVERAKirsten Huerta, PT, 

DPT

 

                2020      Z47.89          Encounter for other orthopedic a

ftercare Stacey Scee P.T.A.

 

                2020      Z47.89          Encounter for other orthopedic a

ftercare Stacey Scee P.T.A.

 

                2020      Z47.89          Encounter for other orthopedic a

ftercare Stacey Scee P.T.A.

 

                2020      Z47.89          Encounter for other orthopedic a

ftercare Lars MIGUELKirsten Huerta, PT, 

DPT

 

                2020      Z47.89          Encounter for other orthopedic a

ftercare Stacey Scee P.T.A.

 

                2020      Z47.89          Encounter for other orthopedic a

ftercare Lars RIVERAKirsten Huerta, PT, 

DPT

 

                2020      Z47.89          Encounter for other orthopedic a

ftercare Stacey Scee P.T.A.

 

                2020      Z47.89          Encounter for other orthopedic a

ftercare Stacey Scee P.T.A.

 

                2020      Z47.89          Encounter for other orthopedic a

ftercare Celena Salinase, PTA

 

                2020      Z47.89          Encounter for other orthopedic a

ftercare Lars MIGUELKirsten Huerta, PT, 

DPT

 

                    2020          M75.112             Incomplete rotator c

uff tear or rupture of left shoulder, not

specified as traumatic                  Giancarlo Real PA-C

 

                2020      M75.42          Impingement syndrome of left taqueria

wilfredo Real PA-C

 

                2020      M19.012         Primary osteoarthritis, left taqueria

wilfredo Real PA-C

 

                2020      Z47.89          Encounter for other orthopedic a

ftercare Codi Sigala PA-C







Plan of Treatment

Future Appointment(s):* 2020  3:30 pm - Stacey Scee P.T.A. at Physical 
  Therapy Referral





Functional Status





                                        Description

 

                                        No Information Available







Mental Status





                                        Description

 

                                        No Information Available







Referrals





                Refer to Dr     Reason for Referral Status          Appt Date

 

                          Henry Trivedi MD        PT - 8 VISITS [facility]

HLDRS FROM 10/19-20, AUTH 

#[id number], TRACKING #[id number]. SS  Created                   

 

                                        [address], [address]

 

                                        [place] [address] [phone]

 

                                          

 

                          Henry Trivedi MD        [id number], [id number] 

DO NOT NEED AUTH, 2982, 

[id number], [id number] AUTH #[id number], SPOKE WITH MARIZA AND SHE WAS ABLE 
TO EXTEND THE AUTH FOR SURGERY FOR ME DATES ARE GOOD FROM 2020-2020.. 
SENT TO [facility] IN SURG SCHED..LD  Created                   

 

                                        [address], [address]

 

                                        [place] [address] [phone]

## 2021-01-27 NOTE — CCD
Continuity of Care Document (CCD)

                             Created on: 2020



Adrián Shin

External Reference #: [id number]

: 1975

Sex: Male



Demographics





                          Address                   [address]

[place]  [address]

 

                          Home Phone                [phone]

 

                          Preferred Language        Unknown

 

                          Marital Status            Unknown

 

                          Anabaptism Affiliation     Unknown

 

                          Race                      White

 

                          Ethnic Group              Not  or 





Author





                          Author                    Adrián ALVARADO [facility]

 

                          Organization              Unknown

 

                          Address                   [address] [place] [address]  [address]



 

                          Phone                     [phone]







Care Team Providers





                    Care Team Member Name Role                Phone

 

                    Wendi Martinez MD AUTM                [phone]

 

                    Zachary Chakraborty MD      AUTM                [phone]







Problems





                    Active Problems     Provider            Date

 

                    Pure hypercholesterolemia Henry Trivedi MD  Onset: 10/07/2

019







Social History





                Type            Date            Description     Comments

 

                Birth Sex                       Unknown          

 

                ETOH Use                        Occasionally consumes alcohol  

 

                Tobacco Use     Start: Unknown  Patient is a current smoker, smo

kes every day smokes 

a pipe 







Allergies, Adverse Reactions, Alerts





             Active Allergies Reaction     Severity     Comments     Date

 

             Effexor                                             10/07/2019

 

             Wellbutrin                                          10/07/2019

 

             Lexapro                                             10/07/2019







Medications





           Active Medications SIG        Qnty       Indications Ordering Provide

r Date

 

                          Tramadol HCL                     50mg Tablets         

          1 tab po every 

4-6 hours as needed pain after surgery(please do not fill until 6/3/2020). 

10tabs                                  Henry Trivedi MD  2020

 

                          Ventolin HFA                     108(90Base) mcg/Act A

erosol                    

                                                Henry Trivedi MD 2019

 

             Breo Ellipta                     200-25mcg/Inh Aerosol             

                                             

Unknown                                 

 

                Spiriva Respimat                     1.25mcg/Act Aerosol        

                                            

                          Unknown                   

 

                          Fluoxetine HCL                     40mg Capsules      

             1 by mouth 

every day                                       Unknown         

 

           Omeprazole                     40mg Capsules DR                      

                              Unknown    



 

                                        Albuterol Sulfate HFA                   

  108(90Base) mcg/Act Aerosol           

                                                    Unknown      

 

           Trazodone HCL                     50mg Tablets                       

                             Unknown    



 

             Clonidine                     0.3mg/24HR Patches Weekly            

                                              

Unknown                                 

 

             Hydroxyzine HCL                     10mg Tablets                   

                                       Unknown

                                        

 

                    Lovastatin                     20mg Tablets                 

  1 by mouth a day     

                                        Unknown             







Immunizations





                                        Description

 

                                        No Information Available







Vital Signs





                Date            Vital           Result          Comment

 

                2019  2:32pm Body Temperature 97.7 F         

 

                2019 10:35am Body Temperature 97.7 F         

 

                    Height              65.5 inches         5'5.50"

 

                    Weight              188.00 lb            

 

                    BMI (Body Mass Index) 30.8 kg/m2           







Results





                                        Description

 

                                        No Information Available







Procedures





                Date            Code            Description     Status

 

                2020      58820           Therapeutic Procedure, Each 15 M

inutes Completed

 

                2020      81788           Therapeutic Procedure, Each 15 M

inutes Completed

 

                2020      07249           Therapeutic Procedure, Each 15 M

inutes Completed

 

                2020      83514           Therapeutic Procedure, Each 15 M

inutes Completed

 

                2020      98493           Hot Or Cold Packs Completed

 

                2020      44250           X-Ray Shoulder Complete Complete

d

 

                2020      55733           Therapeutic Procedure, Each 15 M

inutes Completed

 

                2020      03452           Hot Or Cold Packs Completed

 

                2020      86516           Therapeutic Procedure, Each 15 M

inutes Completed

 

                10/30/2020      92667           Hot Or Cold Packs Completed

 

                10/30/2020      63882           Therapeutic Procedure, Each 15 M

inutes Completed

 

                    10/07/2020          84800               Re-Eval Of PT Establ

ished Plan Of Care 20Mins Face To Face 

PT/Fam                                  Completed

 

                10/07/2020      90428           Therapeutic Procedure, Each 15 M

inutes Completed

 

                10/02/2020      85293           Therapeutic Procedure, Each 15 M

inutes Completed

 

                2020      75510           Therapeutic Procedure, Each 15 M

inutes Completed

 

                2020      61737           Therapeutic Procedure, Each 15 M

inutes Completed

 

                2020      61470           Hot Or Cold Packs Completed

 

                2020      04233           Hot Or Cold Packs Completed

 

                2020      73681           Therapeutic Procedure, Each 15 M

inutes Completed

 

                2020      89053           Therapeutic Procedure, Each 15 M

inutes Completed

 

                2020      12256           Hot Or Cold Packs Completed

 

                2020      91498           Therapeutic Procedure, Each 15 M

inutes Completed

 

                2020      55148           Therapeutic Procedure, Each 15 M

inutes Completed

 

                2020      42997           Hot Or Cold Packs Completed

 

                    2020          85525               Re-Eval Of PT Establ

ished Plan Of Care 20Mins Face To Face 

PT/Fam                                  Completed

 

                2020      55140           Therapeutic Procedure, Each 15 M

inutes Completed

 

                2020      61522           Hot Or Cold Packs Completed

 

                2020      75125           Therapeutic Procedure, Each 15 M

inutes Completed

 

                2020      51225           Therapeutic Procedure, Each 15 M

inutes Completed

 

                2020      24750           Hot Or Cold Packs Completed

 

                2020      52989           Therapeutic Procedure, Each 15 M

inutes Completed

 

                2020      85565           Hot Or Cold Packs Completed

 

                2020      49304           Physical Therapy Eval - Low Comp

lexity Completed

 

                2020      78510           Arthroscopy Shoulder Decompress 

Subacromial Part Acromioplasty 

Completed

 

                    2020          12594               Arthroscopy Shoulder

 Removal Loose/Foreign Body Dist 

Claviculecto                            Completed







Medical Devices





                                        Description

 

                                        No Information Available







Encounters





                                        Description

 

                                        No Information Available







Assessments





                Date            Code            Description     Provider

 

                2020      M25.512         Pain in left shoulder Celena Steel

e, PTA

 

                2020      M25.512         Pain in left shoulder Celena Steel

e, PTA

 

                2020      M25.512         Pain in left shoulder Lars dukes, PT, DPT

 

                2020      M25.512         Pain in left shoulder Celena Steel

e, PTA

 

                2020      M25.512         Pain in left shoulder Sergo Sigala MD

 

                2020      Z47.89          Encounter for other orthopedic a

ftercare Celena Alvarado, PTA

 

                2020      Z47.89          Encounter for other orthopedic a

ftercare Stacey Scee P.T.A.

 

                10/30/2020      Z47.89          Encounter for other orthopedic a

ftercare Celena Alvarado, PTA

 

                10/07/2020      Z47.89          Encounter for other orthopedic a

ftercare Lars Huerta, PT, 

DPT

 

                10/02/2020      Z47.89          Encounter for other orthopedic a

ftercare Celena Alvaraod, PTA

 

                2020      Z47.89          Encounter for other orthopedic a

ftercare Lars Huerta, PT, 

DPT

 

                2020      Z47.89          Encounter for other orthopedic a

ftercare Stacey Scee P.T.A.

 

                2020      Z47.89          Encounter for other orthopedic a

ftercare Stacey Scee P.T.A.

 

                2020      Z47.89          Encounter for other orthopedic a

ftercare Stacey Scee P.T.A.

 

                2020      Z47.89          Encounter for other orthopedic a

ftercare Lars Huerta, PT, 

DPT

 

                2020      Z47.89          Encounter for other orthopedic a

ftercare Stacey Scee P.T.A.

 

                2020      Z47.89          Encounter for other orthopedic a

ftercare Lars Huerta, PT, 

DPT

 

                2020      Z47.89          Encounter for other orthopedic a

ftercare Stacey Scee P.T.A.

 

                2020      Z47.89          Encounter for other orthopedic a

ftercare Stacey Scee P.T.A.

 

                2020      Z47.89          Encounter for other orthopedic a

ftercare Celena Alvarado PTA

 

                2020      Z47.89          Encounter for other orthopedic a

ftercare Lars Huerta, PT, 

DPT

 

                    2020          M75.112             Incomplete rotator c

uff tear or rupture of left shoulder, not

specified as traumatic                  Giancarlo Real PA-C

 

                2020      M75.42          Impingement syndrome of left taqueria

wilfredo Real PA-C

 

                2020      M19.012         Primary osteoarthritis, left taqueria

wilfredo Real PA-C

 

                2020      Z47.89          Encounter for other orthopedic a

ftercare Codi Sigala PA-C







Plan of Treatment

Future Appointment(s):* 2020  3:30 pm - Celena Alvarado PTA at Physical 
  Therapy Referral

* 2020  3:30 pm - Stacey Scee P.T.A. at Physical Therapy Referral





Functional Status





                                        Description

 

                                        No Information Available







Mental Status





                                        Description

 

                                        No Information Available







Referrals





                Refer to Dr     Reason for Referral Status          Appt Date

 

                          Henry Trivedi MD        PT - 8 VISITS GOOD FOR CLAIRE S

HLDRS FROM 10/19-20, AUTH 

#[id number], TRACKING #[id number]. SS  Created                   

 

                                        [address] [place] [address], [address]

 

                                        [place] [address]

 

                                        [phone]

 

                                          

 

                          Henry Trivedi MD        [id number], [id number] 

DO NOT NEED AUTH, 2982, 

[id number], [id number] AUTH #[id number], SPOKE WITH MARIZA AND SHE WAS ABLE 
TO EXTEND THE AUTH FOR SURGERY FOR ME DATES ARE GOOD FROM 2020-2020.. 
SENT TO [facility] IN SURG SCHED..LD  Created                   

 

                                        [address] [place] [address], [address] [place], [place] [address] [phone]

 

                                          

 

                          Hnery Trivedi MD        PT - 13 VISITS FOR CLAIRE SHLDR

S FROM -10/9/20, 

#[id number], REF #[id number]. SS  Created                   

 

                                        [address] [place] [address], [address]

 

                                        [place] [address] [phone]

## 2021-01-27 NOTE — CCD
Continuity of Care Document (CCD)

                             Created on: 2020



Adrián Shin

External Reference #: [id number]

: 1975

Sex: Male



Demographics





                          Address                   [address]

[place]  [address]

 

                          Home Phone                [phone]

 

                          Preferred Language        Unknown

 

                          Marital Status            Unknown

 

                          Catholic Affiliation     Unknown

 

                          Race                      White

 

                          Ethnic Group              Not  or 





Author





                          Author                    Adrián VALENTIN DPT

 

                          Organization              Unknown

 

                          Address                   [address] [place] [address] [address]

[place]  [address]



 

                          Phone                     [phone]







Care Team Providers





                    Care Team Member Name Role                Phone

 

                    Wendi Martinez MD AUTM                [phone]

 

                    Zachary Chakraborty MD      AUTM                [phone]







Problems





                    Active Problems     Provider            Date

 

                    Pure hypercholesterolemia Henry Trivedi MD  Onset: 10/07/2

019







Social History





                Type            Date            Description     Comments

 

                Birth Sex                       Unknown          

 

                ETOH Use                        Occasionally consumes alcohol  

 

                Tobacco Use     Start: Unknown  Patient is a current smoker, smo

kes every day smokes 

a pipe 







Allergies, Adverse Reactions, Alerts





             Active Allergies Reaction     Severity     Comments     Date

 

             Effexor                                             10/07/2019

 

             Wellbutrin                                          10/07/2019

 

             Lexapro                                             10/07/2019







Medications





           Active Medications SIG        Qnty       Indications Ordering Provide

r Date

 

                          Tramadol HCL                     50mg Tablets         

          1 tab po every 

4-6 hours as needed pain after surgery(please do not fill until 6/3/2020). 

10tabs                                  Henry Trivedi MD  2020

 

                          Ventolin HFA                     108(90Base) mcg/Act A

erosol                    

                                                Henry Trivedi MD 2019

 

             Breo Ellipta                     200-25mcg/Inh Aerosol             

                                             

Unknown                                 

 

                Spiriva Respimat                     1.25mcg/Act Aerosol        

                                            

                          Unknown                   

 

                          Fluoxetine HCL                     40mg Capsules      

             1 by mouth 

every day                                       Unknown         

 

           Omeprazole                     40mg Capsules DR                      

                              Unknown    



 

                                        Albuterol Sulfate HFA                   

  108(90Base) mcg/Act Aerosol           

                                                    Unknown      

 

           Trazodone HCL                     50mg Tablets                       

                             Unknown    



 

             Clonidine                     0.3mg/24HR Patches Weekly            

                                              

Unknown                                 

 

             Hydroxyzine HCL                     10mg Tablets                   

                                       Unknown

                                        

 

                    Lovastatin                     20mg Tablets                 

  1 by mouth a day     

                                        Unknown             







Immunizations





                                        Description

 

                                        No Information Available







Vital Signs





                Date            Vital           Result          Comment

 

                2019  2:32pm Body Temperature 97.7 F         

 

                2019 10:35am Body Temperature 97.7 F         

 

                    Height              65.5 inches         5'5.50"

 

                    Weight              188.00 lb            

 

                    BMI (Body Mass Index) 30.8 kg/m2           







Results





        Test    Acquired Date Facility Test    Result  H/L     Range   Note

 

                    Coronavirus 2019 Nasopharygeal 2020          [address] [address]

           (315)-   - Coronavirus 2019 Nasopharygeal Testing was perf <SEE NOTE>

                        1







                          1                         Testing was performed using 

the hollie(R) SARS-CoV-2 test.

This test was developed and its performance characteristics

determined by [facility]. This test has not been

FDA cleared or approved. This test has been authorized by

FDA under an Emergency Use Authorization (EUA). This test

is only authorized for the duration of time the declaration

that circumstances exist justifying the authorization of

the emergency use of in vitro diagnostic tests for

detection of SARS-CoV-2 virus and/or diagnosis of COVID-19

infection under section 564(b)(1) of the Act, 21 U.S.C.

                                        360bbb-3(b)(1), unless the authorization

 is terminated or

revoked sooner. When diagnostic testing is negative, the

possibility of a false negative result should be considered

in the context of a patient's recent exposures and the

presence of clinical signs and symptoms consistent with

COVID-19. An individual without symptoms of COVID-19 and

who is not shedding SARS-CoV-2 virus would expect to have a

negative (not detected) result in this assay.

Performed at:  [address]  [id number]

: Araceli B Reyes MD, Phone:  [phone]



Not Detected











Procedures





                Date            Code            Description     Status

 

                2020      46830           Therapeutic Procedure, Each 15 M

inutes Completed

 

                2020      64639           Hot Or Cold Packs Completed

 

                2020      92929           X-Ray Shoulder Complete Complete

d

 

                2020      42683           Therapeutic Procedure, Each 15 M

inutes Completed

 

                2020      09656           Hot Or Cold Packs Completed

 

                2020      32949           Therapeutic Procedure, Each 15 M

inutes Completed

 

                10/30/2020      04061           Therapeutic Procedure, Each 15 M

inutes Completed

 

                10/30/2020      64413           Hot Or Cold Packs Completed

 

                10/07/2020      32227           Therapeutic Procedure, Each 15 M

inutes Completed

 

                    10/07/2020          92169               Re-Eval Of PT Establ

ished Plan Of Care 20Mins Face To Face 

PT/Fam                                  Completed

 

                10/02/2020      33066           Therapeutic Procedure, Each 15 M

inutes Completed

 

                2020      18460           Therapeutic Procedure, Each 15 M

inutes Completed

 

                2020      35111           Therapeutic Procedure, Each 15 M

inutes Completed

 

                2020      91734           Hot Or Cold Packs Completed

 

                2020      25073           Therapeutic Procedure, Each 15 M

inutes Completed

 

                2020      39683           Hot Or Cold Packs Completed

 

                2020      37188           Hot Or Cold Packs Completed

 

                2020      78123           Therapeutic Procedure, Each 15 M

inutes Completed

 

                2020      28209           Therapeutic Procedure, Each 15 M

inutes Completed

 

                2020      60146           Therapeutic Procedure, Each 15 M

inutes Completed

 

                2020      58325           Hot Or Cold Packs Completed

 

                    2020          52048               Re-Eval Of PT Establ

ished Plan Of Care 20Mins Face To Face 

PT/Fam                                  Completed

 

                2020      28309           Therapeutic Procedure, Each 15 M

inutes Completed

 

                2020      16081           Therapeutic Procedure, Each 15 M

inutes Completed

 

                2020      62189           Hot Or Cold Packs Completed

 

                2020      14504           Hot Or Cold Packs Completed

 

                2020      37172           Therapeutic Procedure, Each 15 M

inutes Completed

 

                2020      26958           Therapeutic Procedure, Each 15 M

inutes Completed

 

                2020      36822           Hot Or Cold Packs Completed

 

                2020      52931           Physical Therapy Eval - Low Comp

lexity Completed

 

                2020      65887           Arthroscopy Shoulder Decompress 

Subacromial Part Acromioplasty 

Completed

 

                    2020          30163               Arthroscopy Shoulder

 Removal Loose/Foreign Body Dist 

Claviculecto                            Completed

 

                2020      17292           Physical Therapy Eval - Low Comp

lexity Completed

 

                2020      91018           Endoscopy Wrist W/Release Transv

erse Carpal Ligament Completed







Medical Devices





                                        Description

 

                                        No Information Available







Encounters





                                        Description

 

                                        No Information Available







Assessments





                Date            Code            Description     Provider

 

                2020      Z47.89          Encounter for other orthopedic a

ftercare Celena Alvarado, PTA

 

                2020      Z47.89          Encounter for other orthopedic a

ftercare Celena Alvarado, PTA

 

                2020      Z47.89          Encounter for other orthopedic a

ftercare Stacey Scee P.T.A.

 

                10/30/2020      Z47.89          Encounter for other orthopedic a

ftercare Celena Alvarado, PTA

 

                10/07/2020      Z47.89          Encounter for other orthopedic a

ftercare Lars Valentin, PT, 

DPT

 

                10/02/2020      Z47.89          Encounter for other orthopedic a

ftercare Celena Alvarado, PTA

 

                2020      Z47.89          Encounter for other orthopedic a

ftercare Larssaskia Valentin, PT, 

DPT

 

                2020      Z47.89          Encounter for other orthopedic a

ftercare Stacey Scee P.T.A.

 

                2020      Z47.89          Encounter for other orthopedic a

ftercare Stacey Scee P.T.A.

 

                2020      Z47.89          Encounter for other orthopedic a

ftercare Stacey Scee P.T.A.

 

                2020      Z47.89          Encounter for other orthopedic a

ftercare Lars Valentin, PT, 

DPT

 

                2020      Z47.89          Encounter for other orthopedic a

ftercare Stacey Scee P.T.A.

 

                2020      Z47.89          Encounter for other orthopedic a

ftercare Lars Valentin PT, 

DPT

 

                2020      Z47.89          Encounter for other orthopedic a

ftercare Stacey Scee P.T.A.

 

                2020      Z47.89          Encounter for other orthopedic a

ftercare Stacey Scee P.T.A.

 

                2020      Z47.89          Encounter for other orthopedic a

ftercare Celena Alvarado, PTA

 

                2020      Z47.89          Encounter for other orthopedic a

ftercare Lars Valentin, PT, 

DPT

 

                    2020          M75.112             Incomplete rotator c

uff tear or rupture of left shoulder, not

specified as traumatic                  Giancarlo Real PA-C

 

                2020      M75.42          Impingement syndrome of left taqueria

wilfredo Giancarlo FLORINA KAREN RealC

 

                2020      M19.012         Primary osteoarthritis, left taqueria

wilfredo Giancarlo FLORINA KAREN RealC

 

                2020      Z47.89          Encounter for other orthopedic a

sharonemily OLSON KAREN SigalaC

 

                2020      Z47.89          Encounter for other orthopedic a

ftrober Valentin, PT, 

DPT

 

                2020      G56.02          Carpal tunnel syndrome, left upp

er limb Henry Trivedi MD







Plan of Treatment

Future Appointment(s):* 2020  3:30 pm - Celena Alvarado PTA at Physical 
  Therapy Referral

* 2020 10:30 am - Stacey Parker P.T.AKirsten at Physical Therapy Referral





Functional Status





                                        Description

 

                                        No Information Available







Mental Status





                                        Description

 

                                        No Information Available







Referrals





                Refer to      Reason for Referral Status          Appt Date

 

                          Henry Trivedi MD        PT - 8 VISITS GOOD FOR CLAIRE S

[facility] FROM 10/19-20, AUTH 

#[id number], TRACKING #[id number]. SS  Created                   

 

                                        [address] [place] [address], [address] [place] [address]

 

                                        [phone]

 

                                          

 

                          Henry Trivedi MD        [id number] 

DO NOT NEED AUTH, [id number], 

[id number], [id number] AUTH #[id number], SPOKE WITH MARIZA AND SHE WAS ABLE 
TO EXTEND THE AUTH FOR SURGERY FOR ME DATES ARE GOOD FROM 2020-2020.. 
SENT TO [facility] IN SURG SCHED..LD  Created                   

 

                                        [address] [place] [address], [address] [place] [address]

 

                                        [phone]

 

                                          

 

                          Henry Trivedi MD        PT - 13 VISITS FOR CLAIRE SHLDR

S FROM -10/9/20, 

#[id number], REF #[id number]. SS  Created                   

 

                                        [address] [place] [address], [place] [address]

 

                                        [phone]

 

                                          

 

                          Henry Trivedi MD        PT EVAL [id number], R

[facility] SHOULDER, ALLOWED 1 VISIT 

THEN PT DEPT CALLS TO GET AUTH, [facility]..LD  Created                   



 

                                        [address], [address]

 

                                        [place] [address]

 

                                        [phone]

## 2021-01-27 NOTE — CCD
Summarization of Episode Note

                             Created on: 2021



AMAN SHIN JULES

External Reference #: [id number]

: 1975

Sex: Male



Demographics





                          Address                   [address]

[place]  [address]

 

                          Home Phone                [phone]

 

                          Preferred Language        Unknown

 

                          Marital Status            Unknown

 

                          Taoism Affiliation     Unknown

 

                          Race                      White

 

                          Ethnic Group              Not  or 





Author





                          Author                    [facility]

ems

 

                          Organization              [facility]

ems

 

                          Address                   Unknown

 

                          Phone                     Unavailable







Support





                Name            Relationship    Address         Phone

 

                    AMAN SHIN         GUAR                [address]

[place], [place]  [address] [phone]

 

                    Ana Shin          ECON                [address]

[place]  [address]                       [phone]







Care Team Providers





                    Care Team Member Name Role                Phone

 

                    Eliza Mcdermott      Unavailable         [phone]







PROBLEMS





          Type      Condition ICD9-CM Code ZOK92-WG Code Onset Dates Condition S

tatus SNOMED 

Code                                    Notes

 

        Problem Neurogenic bladder         N31.9           Active  794608899  

 

        Problem Body mass index (BMI) 34.0-34.9, adult         Z68.34          A

ctive  060992631  

 

        Problem Urge incontinence         N39.41          Active  74843414  

 

        Problem Exercise-induced asthma         J45.990         Active  58206256

  

 

                Problem         Gastroesophageal reflux disease, esophagitis pre

sence not specified                 

K21.9                           Active          488249711       He is on omepraz

ole with control of his symptoms. He 

has seen a gastroenterologist in the past. No records available.

 

          Problem   Major depressive disorder, single episode, unspecified      

     F32.9               Active    

86371328                                 

 

           Problem    Chronic obstructive pulmonary disease, unspecified COPD ty

pe            J44.9                 

Active                    40921755                  He apparently carries this d

iagnosis and is maintained on 

Spiriva and Breo. No active exacerbation. Unfortunately he is still smoking. No 
PFTs are readily available in the medical record. He has been advised to quit 
smoking.

 

        Problem Anxiety disorder, unspecified         F41.9           Active  23

5604873 He is on 

clonidine and high-dose fluoxetine. Symptoms are palliated.

 

        Problem Chronic kidney disease, stage 3 (moderate)         N18.3        

   Active  395048593 

Most recent lab work in 2019 was not remarkable.

 

        Problem Lumbar degenerative disc disease         M51.36          Active 

 73357484  

 

        Problem Insomnia due to other mental disorder         F51.05          Ac

tive  67632855  

 

        Problem Obesity, unspecified         E66.9           Active  945007139  

 

        Problem Feeling of incomplete bladder emptying         R39.14          A

ctive  183964878  

 

        Problem Mixed hyperlipidemia         E78.2           Active  997137145 T

reated by his primary 

provider with lovastatin. I cannot locate a recent lipid profile.

 

        Problem Post-traumatic stress disorder, unspecified         F43.10      

    Active  84938791  

 

        Problem Generalized anxiety disorder         F41.1           Active  218

38117  

 

          Problem   Major depressive disorder, recurrent, moderate           F33

.1               Active    

[id number]                                

 

        Problem Mental disorder, not otherwise specified         F99            

 Active  07134446  







ALLERGIES





                    Allergen (clinical drug ingredient) Drug/Non Drug Allergy do

cumented on EMR 

Reaction            Allergy Type        Onset Date          Status

 

                      Feathers   Unknown    Non Drug Allergy            Active

 

                      Effexor    Excessive Emotions Drug Allergy            Acti

ve

 

           escitalopram Lexapro(NDC Code:[address]-83) Fatigue    Drug Allergy 

           Active

 

                      Wellbutrin Aggressive Drug Allergy            Active







ENCOUNTERS from 1975 to 2021





             Encounter    Location     Date         Provider     Diagnosis

 

                [address] [address]     Eliza Mcdermott                                  







IMMUNIZATIONS

No Information



SOCIAL HISTORY

Tobacco Use:



                    Social History Observation Description         Date

 

                    Details (start date - stop date) Current Smoker       



Sex Assigned At Birth:



                          Social History Observation Description

 

                          Sex Assigned At Birth     Unknown



Education:



                    Question            Answer              Notes

 

                    Level of Education: High School          



Audit



                    Question            Answer              Notes

 

                    Total Score:        1                    

 

                    Interpretation:     Alcohol Education    



Language:



                    Question            Answer              Notes

 

                    Languages spoken:   English              

 

                    Languages spoken:   English              



[place]:



                    Question            Answer              Notes

 

                    Bahai                                No Mormonism beliefs

 that would impact health care.

 

                    [place]            [employer]    



Sexual Hx:



                    Question            Answer              Notes

 

                    Had sex in the last 12 months (vaginal, oral, or anal)? Yes 

                 

 

                    Have you ever had an STD? No                   

 

                    with                Women only           

 

                    Use protection?     No                   



Drug and Alcohol



                    Question            Answer              Notes

 

                    Total Score:        0                    

 

                    Interpretation:     No problems reported  



Alcohol Screening:



                    Question            Answer              Notes

 

                    Did you have a drink containing alcohol in the past year? Ye

s                  

 

                    Points              3                    

 

                    Interpretation      Negative             

 

                          How often did you have six or more drinks on one occas

ion in the past year? 

Never (0 points)                         

 

                                        How many drinks did you have on a typica

l day when you were drinking in the past

year?                     1 or 2 (0 points)          

 

                          How often did you have a drink containing alcohol in t

he past year? Two to three

times per week (3 points)                



Tobacco Use:



                    Question            Answer              Notes

 

                    Are you a:          Uses tobacco in other forms SMOKE PIPE A

BOUT 10 CIGARETTES A DAY

 

                    Are you a:          current smoker       

 

                    Are you interested in quitting? Ready to quit        

 

                    Counseled the patient on tobacco use, cessation provided 10/

           







REASON FOR REFERRAL

No Information



VITAL SIGNS

No information



MEDICATIONS





           Medication SIG (Take, Route, Frequency, Duration) Notes      Start Da

te End Date   

Status

 

           Mucinex 600 MG 1 tab orally bid                                  Acti

ve

 

           FLUoxetine HCl 10 MG 1 capsule Orally Daily                          

        Active

 

           FLUoxetine HCl 40 MG 1 capsule orally Daily                          

        Active

 

           Clonidine HCl 0.2 MG 1 tablet Orally before bedtime            13 Dec

, 2019            Active

 

           Mirtazapine 7.5 MG tablets at bedtime Orally before bedtime          

                        Active

 

                          Ventolin  (90 Base) MCG/ACT 1 puff as needed In

halation every 4 hours as 

needed                                                          Active

 

           Incruse Ellipta 62.5 MCG/INH 1 puff Inhalation Once a day            

                      Active

 

           Omeprazole 40 MG 1 capsule Orally Daily                              

    Active

 

           HydrOXYzine HCl 10 MG 2 tabs Orally before bedtime                   

               Active

 

           Nystatin 527510 UNIT/GM as directed Externally B)Feet Once a day     

                             Active

 

           FLUoxetine HCl 40 MG 1 capsule Orally Once a day for 90 days         

               

Not-Taking

 

                Albuterol Sulfate (2.5 MG/3ML) 0.083% 1 vial Inhalation every 6 

hrs as needed                 

                                                    Active

 

           Breo Ellipta 200-25 MCG/INH 1 puff Inhalation Once a day             

                     Active

 

           Lovastatin 20 MG 1 tab orally Daily                                  

Active

 

           Acetaminophen 500 MG 1 capsule as needed Orally every 6 hrs          

  19 Dec, 2019            

Active







PROCEDURES

No Information



RESULTS

No Results



REASON FOR VISIT

Labs/preop



MEDICAL (GENERAL) HISTORY





                    Type                Description         Date

 

                    Surgical History    R)knee surgery      

 

                    Surgical History    L)knee surgery      

 

                    Surgical History    s/p B Inguinal Hernia Repair- Dr. Foster

2006

 

                    Surgical History    Inguinal Hernia Repair 

 

                    Surgical History    s/p REctal polyps removed Dr. Szymanski 



 

                    Surgical History    LEFT ELBOW PINCHED NERVE 

 

                    Surgical History    R) carpal tunnel repair 19

 

                    Surgical History    Cystoscopy apparently with Botox instill

ation?-Dr. Perez 

2019

 

                    Surgical History    Left carpal tunnel repair 6/3/2020

 

                    Surgical History    R)shoulder surgery debridement 2020







Goals Section

No Information



Health Concerns

No Information



MEDICAL EQUIPMENT

No Information



MENTAL STATUS

No Information



FUNCTIONAL STATUS

No Information



ASSESSMENTS

No Information



PLAN OF TREATMENT

Medication



                Medication Name Sig             Start Date      Stop Date

 

                          Ventolin  (90 Base) MCG/ACT 1 puff as needed In

halation every 4 hours as 

needed                                               

 

                Breo Ellipta 200-25 MCG/INH 1 puff Inhalation Once a day        

          

 

                Mirtazapine 7.5 MG tablets at bedtime Orally before bedtime     

             

 

                Albuterol Sulfate (2.5 MG/3ML) 0.083% 1 vial Inhalation every 6 

hrs as needed                 



 

                Clonidine HCl 0.2 MG 1 tablet Orally before bedtime 13 Dec, 2019

     

 

                Acetaminophen 500 MG 1 capsule as needed Orally every 6 hrs 19 D

2019     

 

                Incruse Ellipta 62.5 MCG/INH 1 puff Inhalation Once a day       

           

 

                Omeprazole 40 MG 1 capsule Orally Daily                  

 

                HydrOXYzine HCl 10 MG 2 tabs Orally before bedtime              

    

 

                FLUoxetine HCl 40 MG 1 capsule orally Daily                  

 

                FLUoxetine HCl 10 MG 1 capsule Orally Daily                  

 

                Lovastatin 20 MG 1 tab orally Daily                  



Next Appt



                                        Details

 

                                        Provider Name:Jose Garner, 2021 10:0

0:00 AM, [address] [place] [address], [phone]

 

                                        Provider Name:Mirta Garay,  10:45:00 AM, [address] [place] [address], [address], [phone]







Insurance Providers





             Payer Name   Payer Address Payer Phone  Insured Name Patient Relati

onship to 

Insured                   Coverage Start Date       Coverage End Date

 

                [facility]         CORPORATE CLAIMS DEPT [address] [facility] [address]

[phone]    

AMAN SHIN        self

## 2021-01-27 NOTE — CCD
Summarization of Episode Note

                             Created on: 2020



ADRIÁN SHIN JULES

External Reference #: [id number]

: 1975

Sex: Male



Demographics





                          Address                   [address]

[place]  [address]

 

                          Home Phone                [phone]

 

                          Preferred Language        Unknown

 

                          Marital Status            Unknown

 

                          Quaker Affiliation     Unknown

 

                          Race                      White

 

                          Ethnic Group              Not  or 





Author





                          Author                    [facility]

ems

 

                          Organization              [facility]

ems

 

                          Address                   Unknown

 

                          Phone                     Unavailable







Support





                Name            Relationship    Address         Phone

 

                    ADRIÁN SHIN         GUAR                [address]

[place], [place]  [address] [phone]

 

                    Ana Shin          ECON                [address]

[place]  [address]                       [phone]







Care Team Providers





                    Care Team Member Name Role                Phone

 

                    Jose Garner         Unavailable         [phone]







PROBLEMS





          Type      Condition ICD9-CM Code LAA42-XE Code Onset Dates Condition S

tatus SNOMED 

Code                                    Notes

 

        Problem Neurogenic bladder         N31.9           Active  274368858  

 

        Problem Body mass index (BMI) 34.0-34.9, adult         Z68.34          A

ctive  242969608  

 

        Problem Urge incontinence         N39.41          Active  02543114  

 

        Problem Exercise-induced asthma         J45.990         Active  89108869

  

 

                Problem         Gastroesophageal reflux disease, esophagitis pre

sence not specified                 

K21.9                           Active          518185081       He is on omepraz

ole with control of his symptoms. He 

has seen a gastroenterologist in the past. No records available.

 

          Problem   Major depressive disorder, single episode, unspecified      

     F32.9               Active    

[id number]                                 

 

           Problem    Chronic obstructive pulmonary disease, unspecified COPD ty

pe            J44.9                 

Active                    38111630                  He apparently carries this d

iagnosis and is maintained on 

Spiriva and Breo. No active exacerbation. Unfortunately he is still smoking. No 
PFTs are readily available in the medical record. He has been advised to quit 
smoking.

 

        Problem Anxiety disorder, unspecified         F41.9           Active  23

4213677 He is on 

clonidine and high-dose fluoxetine. Symptoms are palliated.

 

        Problem Chronic kidney disease, stage 3 (moderate)         N18.3        

   Active  699691984 

Most recent lab work in 2019 was not remarkable.

 

        Problem Lumbar degenerative disc disease         M51.36          Active 

 72514269  

 

        Problem Insomnia due to other mental disorder         F51.05          Ac

tive  85737379  

 

        Problem Obesity, unspecified         E66.9           Active  284751010  

 

        Problem Feeling of incomplete bladder emptying         R39.14          A

ctive  049620480  

 

        Problem Mixed hyperlipidemia         E78.2           Active  208843984 T

reated by his primary 

provider with lovastatin. I cannot locate a recent lipid profile.

 

        Problem Post-traumatic stress disorder, unspecified         F43.10      

    Active  97541056  

 

        Problem Generalized anxiety disorder         F41.1           Active  218

50682  

 

          Problem   Major depressive disorder, recurrent, moderate           F33

.1               Active    

[id number]                                

 

        Problem Mental disorder, not otherwise specified         F99            

 Active  26379481  







ALLERGIES





                    Allergen (clinical drug ingredient) Drug/Non Drug Allergy do

cumented on EMR 

Reaction            Allergy Type        Onset Date          Status

 

                      Feathers   Unknown    Non Drug Allergy            Active

 

                      Effexor    Excessive Emotions Drug Allergy            Acti

ve

 

           escitalopram Lexapro(NDC Code:[address]-50) Fatigue    Drug Allergy 

           Active

 

                      Wellbutrin Aggressive Drug Allergy            Active







ENCOUNTERS from 1975 to 2020





             Encounter    Location     Date         Provider     Diagnosis

 

                [facility] [address] [place] [address]

 30 Dec, 2020    

Jose Garner                               Generalized anxiety disorder F41.1 ; Fab

or depressive disorder, 

recurrent, moderate F33.1 and Post-traumatic stress disorder, unspecified F43.10







IMMUNIZATIONS

No Information



SOCIAL HISTORY

Tobacco Use:



                    Social History Observation Description         Date

 

                    Details (start date - stop date) Current Smoker       



Sex Assigned At Birth:



                          Social History Observation Description

 

                          Sex Assigned At Birth     Unknown



Education:



                    Question            Answer              Notes

 

                    Level of Education: High School          



Audit



                    Question            Answer              Notes

 

                    Total Score:        1                    

 

                    Interpretation:     Alcohol Education    



Language:



                    Question            Answer              Notes

 

                    Languages spoken:   English              

 

                    Languages spoken:   English              



Quaker:



                    Question            Answer              Notes

 

                    Quaker                                No Mosque beliefs

 that would impact health care.

 

                    Quaker            21 [employer]    



Sexual Hx:



                    Question            Answer              Notes

 

                    Had sex in the last 12 months (vaginal, oral, or anal)? Yes 

                 

 

                    Have you ever had an STD? No                   

 

                    with                Women only           

 

                    Use protection?     No                   



Drug and Alcohol



                    Question            Answer              Notes

 

                    Total Score:        0                    

 

                    Interpretation:     No problems reported  



Alcohol Screening:



                    Question            Answer              Notes

 

                    Did you have a drink containing alcohol in the past year? Ye

s                  

 

                    Points              3                    

 

                    Interpretation      Negative             

 

                          How often did you have six or more drinks on one occas

ion in the past year? 

Never (0 points)                         

 

                                        How many drinks did you have on a typica

l day when you were drinking in the past

year?                     1 or 2 (0 points)          

 

                          How often did you have a drink containing alcohol in t

he past year? Two to three

times per week (3 points)                



Tobacco Use:



                    Question            Answer              Notes

 

                    Are you a:          Uses tobacco in other forms SMOKE PIPE A

BOUT 10 CIGARETTES A DAY

 

                    Are you a:          current smoker       

 

                    Are you interested in quitting? Ready to quit        

 

                    Counseled the patient on tobacco use, cessation provided 10/

           







REASON FOR REFERRAL

No Information



VITAL SIGNS

No information



MEDICATIONS





           Medication SIG (Take, Route, Frequency, Duration) Notes      Start Da

te End Date   

Status

 

           Breo Ellipta 200-25 MCG/INH 1 puff Inhalation Once a day             

                     Active

 

           HydrOXYzine HCl 10 MG 2 tabs Orally before bedtime for 30            

                      Active

 

                Acetaminophen 500 MG 1 capsule as needed Orally every 6 hrs for 

7 day(s)                 19 

Dec, 2019                                           Active

 

           Mirtazapine 7.5 MG tablets at bedtime Orally before bedtime for 30   

                               Active

 

           Nystatin 471758 UNIT/GM as directed Externally B)Feet Once a day     

                             Active

 

           Omeprazole 40 MG 1 capsule Orally Daily for 30                       

           Active

 

           FLUoxetine HCl 40 MG 1 capsule orally Daily for 90 day(s)            

                      Active

 

           FLUoxetine HCl 10 MG 1 capsule Orally Daily for 90 day(s)            

                      Active

 

                          Ventolin  (90 Base) MCG/ACT 1 puff as needed In

halation every 4 hours as 

needed                                                          Active

 

           Mucinex 600 MG 1 tab orally bid for 30 Days                          

        Active

 

             Clonidine HCl 0.2 MG 1 tablet Orally before bedtime for 30 Days    

          13 Dec, 2019  

                                        Active

 

           Lovastatin 20 MG 1 tab orally Daily for 30                           

       Active

 

                          Albuterol Sulfate (2.5 MG/3ML) 0.083% 1 vial Inhalatio

n every 6 hrs as needed 

for 30 days                                                     Active

 

           Incruse Ellipta 62.5 MCG/INH 1 puff Inhalation Once a day            

                      Active







PROCEDURES

No Information



RESULTS

No Results



REASON FOR VISIT

TS FOLLOW UP



MEDICAL (GENERAL) HISTORY





                    Type                Description         Date

 

                    Surgical History    R)knee surgery      

 

                    Surgical History    L)knee surgery      

 

                    Surgical History    s/p B Inguinal Hernia Repair- Dr. Foster

2006

 

                    Surgical History    Inguinal Hernia Repair 

 

                    Surgical History    s/p REctal polyps removed Dr. Szymanski 



 

                    Surgical History    LEFT ELBOW PINCHED NERVE 

 

                    Surgical History    R) carpal tunnel repair 19

 

                    Surgical History    Cystoscopy apparently with Botox instill

ation?-Dr. Perez 

2019

 

                    Surgical History    Left carpal tunnel repair 6/3/2020

 

                    Surgical History    R)shoulder surgery debridement 2020







Goals Section

No Information



Health Concerns

No Information



MEDICAL EQUIPMENT

No Information



MENTAL STATUS

No Information



FUNCTIONAL STATUS

No Information



ASSESSMENTS





             Encounter Date Diagnosis    Assessment Notes Treatment Notes Treatm

ent Clinical 

Notes

 

                30 Dec, 2020    Generalized anxiety disorder (ICD-10 - F41.1)   

              



                                        





 

                30 Dec, 2020    Major depressive disorder, recurrent, moderate (

ICD-10 - F33.1)                 



                                        





 

                30 Dec, 2020    Post-traumatic stress disorder, unspecified (ICD

-10 - F43.10)                 



                                        





 

             30 Dec, 2020 Other                                  Utilized CBT to

 review a challenging irrational thoughts 

technique for decreasing his symptoms of depression. Adrián arrived on time for 
his appointment and actively participated in his treatment plan review. Adrián 
was responsive to the CBT challenging irrational thoughts for decreasing his 
symptoms of depression. Adrián reports that he has had symptoms of depression 
related to the holidays and thinking about his family. Adrián agreed to utilize 
the technique that we role played and will return for psychotherapy on 
1/15/2021. Adrián is aware to call for an earlier appointment or utilize the ED 
for MH emergencies.







PLAN OF TREATMENT

Medication



                Medication Name Sig             Start Date      Stop Date

 

                Mirtazapine 7.5 MG tablets at bedtime Orally before bedtime for 

30                  

 

                Lovastatin 20 MG 1 tab orally Daily for 30                  

 

                FLUoxetine HCl 10 MG 1 capsule Orally Daily for 90 day(s)       

           

 

                HydrOXYzine HCl 10 MG 2 tabs Orally before bedtime for 30       

           

 

                Incruse Ellipta 62.5 MCG/INH 1 puff Inhalation Once a day       

           

 

                FLUoxetine HCl 40 MG 1 capsule orally Daily for 90 day(s)       

           

 

                Breo Ellipta 200-25 MCG/INH 1 puff Inhalation Once a day        

          

 

                          Ventolin  (90 Base) MCG/ACT 1 puff as needed In

halation every 4 hours as 

needed                                               

 

                Omeprazole 40 MG 1 capsule Orally Daily for 30                  



Next Appt



                                        Details

 

                                        Provider Name:Jose Garner, 2021-01-15 02:0

0:00 PM, [address] [place], [address]







Insurance Providers





             Payer Name   Payer Address Payer Phone  Insured Name Patient Relati

onship to 

Insured                   Coverage Start Date       Coverage End Date

 

                [facility]         CORPORATE CLAIMS DEPT [facility] [address] [address]

[phone]    

ADRIÁN SHIN

## 2021-01-27 NOTE — CCD
Continuity of Care Document (CCD)

                             Created on: 2020



Adrián Shin

External Reference #: [id number]

: 1975

Sex: Male



Demographics





                          Address                   [address]

[place]  [address]

 

                          Home Phone                [phone]

 

                          Preferred Language        Unknown

 

                          Marital Status            Unknown

 

                          Hindu Affiliation     Unknown

 

                          Race                      White

 

                          Ethnic Group              Not  or 





Author





                          Author                    Adrián BUCIO MD

 

                          Organization              Unknown

 

                          Address                   [address] [place] [address], [address]

[address]

[place]  [address]



 

                          Phone                     [phone]







Care Team Providers





                    Care Team Member Name Role                Phone

 

                    Henry Trivedi MD     AUTM                [phone]

 

                    Mirta Garay AUTM                [phone]

[phone]







Problems





                                        Description

 

                                        No Information Available







Social History





                Type            Date            Description     Comments

 

                Birth Sex                       Unknown          

 

                Tobacco Use     Start: 88 Patient is a current smoker, smo

kes every day smokes

pipes daily; started at age 12 smokes 1/2 a pack a day  

 

                Smoking Status  Reviewed: 10/26/20 Patient is a current smoker, 

smokes every day 

smokes pipes daily; started at age 12 smokes 1/2 a pack a day  







Allergies, Adverse Reactions, Alerts





             Active Allergies Reaction     Severity     Comments     Date

 

             Lexapro                                             2019

 

             Wellbutrin                                          2019

 

             Effexor                                             2019







Medications





           Active Medications SIG        Qnty       Indications Ordering Provide

r Date

 

                          Percocet                     5-325mg Tablets          

         1 by mouth every 

4 hours for prn post op pain. (please do not fill until 2020.) 20tabs       

                           

Jose Bucio MD                      2020

 

                          Incruse Ellipta                     62.5mcg/Inh Aeroso

l                   1 puff

every day       30units                         Williams Camacho MD 2020

 

                          Breo Ellipta                     200-25mcg/Inh Aerosol

                   inhale 

one puff by mouth every day 60units                         Williams Camacho MD 

2019

 

                                        Oxybutynin Chloride ER                  

   5mg Tablets ER 24HR                  

             Take One Tablet By Mouth Every Day                           Unknow

n      

 

                          Meloxicam                     7.5mg Tablets           

        Take One Tablet By

Mouth Twice A Day After Food                                 Unknown         

 

                          Nystatin                     583747Xsib/GM Cream      

             Apply 

Topically To Feet Every Day as Directed                                 Unknown 

        

 

                          Furosemide                     20mg Tablets           

        Take One Tablet By

Mouth Every Day For 7 Days                                 Unknown         

 

                          Tramadol HCL                     50mg Tablets         

          Take One Tablet 

By Mouth Every 4 To 6 Hours as Needed For Pain After Surgery   Maximum Daily 
Dose   6                                        Unknown         

 

                          Tamsulosin HCL                     0.4mg Capsules     

              Take One 

Capsule By Mouth Every Day                                 Unknown         

 

                                        Albuterol Sulfate                     (2

.5mg/3ML) 0.083% Nebulizer              

             1 vial four times a day as needed                           Unknown

      

 

                          Ventolin HFA                     108(90Base) mcg/Act A

erosol                   2

puffs qid/prn                                   Unknown         

 

                          Clonidine HCL                     0.2mg Tablets       

            1 by mouth 

every day                                       Unknown         

 

                          Mucinex                     600mg Tablets ER 12HR     

              take one by 

mouth every 12 hours.                                 Unknown         

 

                          Trazodone HCL                     50mg Tablets        

           1 tab by mouth 

every night                                     Unknown         

 

                          Fluoxetine HCL                     40mg Capsules      

             1 by mouth 

every day                                       Unknown         

 

                          Fluoxetine HCL                     10mg Capsules      

             1 by mouth 

every day                                       Unknown         

 

                          Lovastatin                     10mg Tablets           

        1 by mouth every 

night at bedtime                                 Unknown         

 

                          Omeprazole                     40mg Capsules DR       

            1 by mouth 

every day                                       Unknown         

 

                          Hydroxyzine HCL                     25mg Tablets      

             1 by mouth 

every day                                       Unknown         







Immunizations





                                        Description

 

                                        No Information Available







Vital Signs





                Date            Vital           Result          Comment

 

                2020  3:24pm Body Temperature 97.1 F         

 

                10/26/2020  3:33pm BP Systolic     114 mmHg         

 

                    BP Diastolic        70 mmHg              

 

                    Heart Rate          73 /min              

 

                    O2 % BldC Oximetry  95 %                Room Air

 

                    Height              66 inches           5'6"

 

                    Weight              194.00 lb            

 

                    BMI (Body Mass Index) 31.3 kg/m2           

 

                    Ideal Body Weight   142 lb               

 

                    Weight              87.998 kg            







Results





        Test    Acquired Date Facility Test    Result  H/L     Range   Note

 

                    Laboratory test finding 2020          [facility]

                                        [address] [place] [address] [phone] Coronavirus 2019 Nasopharygeal Testing was perf <SEE N

OTE>                        1

 

                    Order               2020          [facility]

                                        [address] [place] [address] [phone] Physical Therapy <pending>                         







                          1                         Testing was performed using 

the hollie(R) SARS-CoV-2 test.

This test was developed and its performance characteristics

determined by [employer]. This test has not been

FDA cleared or approved. This test has been authorized by

FDA under an Emergency Use Authorization (EUA). This test

is only authorized for the duration of time the declaration

that circumstances exist justifying the authorization of

the emergency use of in vitro diagnostic tests for

detection of SARS-CoV-2 virus and/or diagnosis of COVID-19

infection under section 564(b)(1) of the Act, 21 U.S.C.

                                        360bbb-3(b)(1), unless the authorization

 is terminated or

revoked sooner. When diagnostic testing is negative, the

possibility of a false negative result should be considered

in the context of a patient's recent exposures and the

presence of clinical signs and symptoms consistent with

COVID-19. An individual without symptoms of COVID-19 and

who is not shedding SARS-CoV-2 virus would expect to have a

negative (not detected) result in this assay.

Performed at:  [address]  [id number]

: Araceli B Reyes MD, Phone:  [phone]



Not Detected











Procedures





                Date            Code            Description     Status

 

                10/26/2020      20752           Spirometry      Completed

 

                2020      14454           Arthroscopy Shoulder Decompress 

Subacromial Part Acromioplasty 

Completed

 

                2020      84599           Arthroscopy,Distal Claviculectom

y Including Distal Articular 

Completed







Medical Devices





                                        Description

 

                                        No Information Available







Encounters





           Type       Date       Location   Provider   Dx         Diagnosis

 

           Office Visit 2020  3:10p Rao Bucio MD Z47.89     

Encounter for other orthopedic aftercare

 

                          M25.512                   Pain in left shoulder

 

             Office Visit 10/26/2020  3:30p [facility] Catrachito Camacho MD 

J44.9                                   Chronic obstructive pulmonary disease, u

nspecified

 

                          F17.218                   Nicotine dependence, cigaret

varinder, w oth disorders

 

           Office Visit 2020 10:50a Rao Bucio MD Z47.89     

Encounter for other orthopedic aftercare

 

           Office Visit 2020  1:40p Rao Bucio MD Z47.89     

Encounter for other orthopedic aftercare

 

                          M75.42                    Impingement syndrome of left

 shoulder

 

           Office Visit 2020  9:40a [facility] Jose Bucio MD Z47.89     

Encounter for other orthopedic aftercare







Assessments





                Date            Code            Description     Provider

 

                2020      Z47.89          Encounter for other orthopedic a

kayla Bucio MD

 

                2020      M25.512         Pain in left shoulder Jose french MD

 

                10/26/2020      J44.9           Chronic obstructive pulmonary di

sease, unspecified Williams Camacho MD

 

                10/26/2020      F17.218         Nicotine dependence, cigarettes,

 with other nicotine-induced 

Williams Camacho MD

 

                2020      Z47.89          Encounter for other orthopedic a

kayla Bucio MD

 

                2020      M19.012         Primary osteoarthritis, left taqueria

travrony Bucio MD

 

                    2020          M75.112             Incomplete rotator c

uff tear or rupture of left shoulder, not

specified as traumatic                  Jose Bucio MD

 

                2020      M75.42          Impingement syndrome of left taqueria

ulrony Jose Bucio MD

 

                2020      M75.52          Bursitis of left shoulder Jose Bucio MD

 

                2020      Z47.89          Encounter for other orthopedic a

kayla Bucio MD

 

                2020      M75.42          Impingement syndrome of left taqueria

wilfredo Jose Bucio MD

 

                2020      Z47.89          Encounter for other orthopedic a

kayla Bucio MD







Plan of Treatment

Future Appointment(s):* 2021  3:30 pm - Williams Camacho MD at [facility] 
  Pulmonary/Thoracic

2020 - Jose Bucio MD* Z47.89 Encounter for other orthopedic aftercare

* M25.512 Pain in left shoulder* New Xrays:* MRI Arthrogram Left Shoulder, 
  Ordered: 20



* Follow up:* with SW after mri for results









Functional Status





                                        Description

 

                                        No Information Available







Mental Status





                                        Description

 

                                        No Information Available







Referrals





                Refer to      Reason for Referral Status          Appt Date

 

                          Jose Bucio MD       PT LEFT SHOULDER, ALLOWED 1 

VISIT THEN PT DEPT WILL CALL TO 

GET AUTH FOR CONTINUATION,, SENT TO [facility]..LD  Created                   

 

                                        [address], [address]

 

                                        [place] [address]

 

                                        [phone]

 

                                          

 

                          Jose Bucio MD       [id number]& DO NOT NE

ED AUTH, [id number] AUTH 

#[id number], SPOKE WITH MARIZA AND SHE WAS ABLE TO EXTEND THE AUTH FOR SURGERY 
FOR ME DATES ARE GOOD FROM 2020-2020.. SENT TO [facility] IN SURG SCHED..LD 
                          Closed                    

 

                                        [address], [address]

 

                                        [place] [address]

 

                                        [phone]

 

                                          

 

                          Jose Bucio MD       PT EVAL [id number], R

[facility] SHOULDER, ALLOWED 1 VISIT 

THEN PT DEPT CALLS TO GET [facility]URROLA PT DEPT..LD  Created                   

 

                                        [address], [address] [place] [address]

 

                                        [phone]

## 2021-01-27 NOTE — RO
OPERATIVE NOTE



DATE OF OPERATION: 01/27/2021



PREOPERATIVE DIAGNOSIS:

Left shoulder rotator cuff tear.



POSTOPERATIVE DIAGNOSIS: 

Left shoulder rotator cuff tear.



PLANNED PROCEDURE: Left shoulder arthroscopic rotator cuff repair.



PROCEDURE PERFORMED: Left shoulder arthroscopic rotator cuff repair.



SURGEON: Dr. Jose Guerra



ASSISTANT: Dr. Stephens.



ANESTHESIA: General anesthetic, preoperative block.



PREAMBLE: This 45-year-old man had a previous rotator cuff debridement surgery

as well as distal clavicle excision. He had a subsequent injury to his arm with

a dog pulling on the arm. Repeat MRI showed full-thickness, partial width

posterior-superior supraspinatus tendon tear. He wishes to go ahead with

surgery. I reminded him of the risks and marked the upper extremity.



DESCRIPTION OF PROCEDURE: Patient was brought to the operating room theater. We

administered 2 gram of intravenous (IV) Ancef prior to the start of the case.

The patient was placed in the beach chair positioner. General anesthesia was

induced. All bony prominences were padded. Sequential compression devices

(SCDs) were used on the legs. Daylin hugger employed. The patient sat up at a

50-degree angle. The limb was prepped and draped in the usual sterile fashion

with chlorhexidine-based prep solution, allowing over 3 minutes of prep

solution drying. A preoperative time-out was performed to confirm the site, the

patient, and surgery. 



We began by making standard posterior and anterior arthroscopy portals.

Arthroscope was inserted into the interarticular portion of the left shoulder.

Cartilage in the glenohumerals was normal. Biceps appeared stable to probing.

An anterior portal was created through the rotator interval inside-out with

spinal needle localization just posterior to the biceps tendon. There was an

area of suspected full-thickness tearing near the mid to posterior aspects of

the supraspinatus tendon on the undersurface. Subscapularis as well as the rest

of the interarticular portion of the shoulder appeared normal.



Scope was withdrawn and inserted in the subacromial space. A complete

bursectomy was performed. Subacromial decompression looked normal from the last

operation. There was an area approximately 5 mm anterior to posteriorly as well

as 5 mm medial to laterally. At the mid to posterior aspect of the

supraspinatus tendon there was full thickness. Tear was slightly enlarged 1 mm

in either direction. I then used a PowerPick instrument to create a bleeding

surface for tendon healing. I used #2 FiberWire sutures in inverted  horizontal

mattress fashion. I used two stitches to create four suture limbs. I then

passed these in a neville-cross fashion into an Arthrex 4.75 mm BioComposite

Swivelock. I tapped for this laterally and then inserted the anchor laterally

and screwed it into place with an appropriate amount of tension. Final

arthroscopy pictures were taken. Stay suture was removed and other sutures cut

short. Repair had an appropriate configuration with a solid repair. 



Arthroscope was withdrawn. All wounds were thoroughly irrigated. I did use a

lateral-based portal to help in the repair with an 8.25 mm cannula. This was

removed and portal 

site closed with 2-0 Vicryl suture. Steri-Strips were applied followed by

Adaptic, 4 x 4 gauze, ABD dressing, cloth tape and the patient's upper

extremity placed into a sling.



Patient was woken up from general anesthetic, transferred off the operating

table, and taken to postanesthetic care unit in stable condition. All sponge,

needle, and instrument counts were correct. No complications. The estimated

blood loss was 50 mL.



Plan for patient is to start immediate pendulum exercises, physical therapy in

2 weeks'

time for passive range of motion as well as the  hand , and elbow

exercises. I will follow him up in 2 weeks' time. I have talked to the patient

after surgery as well. Prescription will be sent to the pharmacy of choice

electronically.

## 2021-01-27 NOTE — CCD
Continuity of Care Document (CCD)

                             Created on: 2020



Adrián Shin

External Reference #: [id number]

: 1975

Sex: Male



Demographics





                          Address                   [address]

[place]  [address]

 

                          Home Phone                [phone]

 

                          Preferred Language        Unknown

 

                          Marital Status            Unknown

 

                          Adventist Affiliation     Unknown

 

                          Race                      White

 

                          Ethnic Group              Not  or 





Author





                          Author                    Adrián [facility]

 

                          Organization              Unknown

 

                          Address                   [address] [place] [address]  [address]



 

                          Phone                     [phone]







Care Team Providers





                    Care Team Member Name Role                Phone

 

                    Wendi Martinez MD AUTM                [phone]

 

                    Zachary Chakraborty MD      AUTM                [phone]







Problems





                    Active Problems     Provider            Date

 

                    Pure hypercholesterolemia Henry Trivedi MD  Onset: 10/07/2

019







Social History





                Type            Date            Description     Comments

 

                Birth Sex                       Unknown          

 

                ETOH Use                        Occasionally consumes alcohol  

 

                Tobacco Use     Start: Unknown  Patient is a current smoker, smo

kes every day smokes 

a pipe 







Allergies, Adverse Reactions, Alerts





             Active Allergies Reaction     Severity     Comments     Date

 

             Effexor                                             10/07/2019

 

             Wellbutrin                                          10/07/2019

 

             Lexapro                                             10/07/2019







Medications





           Active Medications SIG        Qnty       Indications Ordering Provide

r Date

 

                          Tramadol HCL                     50mg Tablets         

          1 tab po every 

4-6 hours as needed pain after surgery(please do not fill until 6/3/2020). 

10tabs                                  Henry Trivedi MD  2020

 

                          Ventolin HFA                     108(90Base) mcg/Act A

erosol                    

                                                Henry Trivedi MD 2019

 

             Breo Ellipta                     200-25mcg/Inh Aerosol             

                                             

Unknown                                 

 

                Spiriva Respimat                     1.25mcg/Act Aerosol        

                                            

                          Unknown                   

 

                          Fluoxetine HCL                     40mg Capsules      

             1 by mouth 

every day                                       Unknown         

 

           Omeprazole                     40mg Capsules DR                      

                              Unknown    



 

                                        Albuterol Sulfate HFA                   

  108(90Base) mcg/Act Aerosol           

                                                    Unknown      

 

           Trazodone HCL                     50mg Tablets                       

                             Unknown    



 

             Clonidine                     0.3mg/24HR Patches Weekly            

                                              

Unknown                                 

 

             Hydroxyzine HCL                     10mg Tablets                   

                                       Unknown

                                        

 

                    Lovastatin                     20mg Tablets                 

  1 by mouth a day     

                                        Unknown             







Immunizations





                                        Description

 

                                        No Information Available







Vital Signs





                Date            Vital           Result          Comment

 

                2019  2:32pm Body Temperature 97.7 F         

 

                2019 10:35am Body Temperature 97.7 F         

 

                    Height              65.5 inches         5'5.50"

 

                    Weight              188.00 lb            

 

                    BMI (Body Mass Index) 30.8 kg/m2           







Results





                                        Description

 

                                        No Information Available







Procedures





                Date            Code            Description     Status

 

                2020      00167           Therapeutic Procedure, Each 15 M

inutes Completed

 

                2020      26919           Hot Or Cold Packs Completed

 

                2020      04406           Therapeutic Procedure, Each 15 M

inutes Completed

 

                2020      53077           Therapeutic Procedure, Each 15 M

inutes Completed

 

                2020      96947           Therapeutic Procedure, Each 15 M

inutes Completed

 

                2020      32159           Therapeutic Procedure, Each 15 M

inutes Completed

 

                2020      01118           Therapeutic Procedure, Each 15 M

inutes Completed

 

                2020      86487           Hot Or Cold Packs Completed

 

                2020      47199           X-Ray Shoulder Complete Complete

d

 

                2020      52850           Hot Or Cold Packs Completed

 

                2020      77524           Therapeutic Procedure, Each 15 M

inutes Completed

 

                2020      73748           Therapeutic Procedure, Each 15 M

inutes Completed

 

                10/30/2020      03185           Therapeutic Procedure, Each 15 M

inutes Completed

 

                10/30/2020      71162           Hot Or Cold Packs Completed

 

                    10/07/2020          01802               Re-Eval Of PT Establ

ished Plan Of Care 20Mins Face To Face 

PT/Fam                                  Completed

 

                10/07/2020      97067           Therapeutic Procedure, Each 15 M

inutes Completed

 

                10/02/2020      45343           Therapeutic Procedure, Each 15 M

inutes Completed

 

                2020      21559           Therapeutic Procedure, Each 15 M

inutes Completed

 

                2020      56002           Hot Or Cold Packs Completed

 

                2020      66806           Therapeutic Procedure, Each 15 M

inutes Completed

 

                2020      67782           Therapeutic Procedure, Each 15 M

inutes Completed

 

                2020      35011           Hot Or Cold Packs Completed

 

                2020      30776           Therapeutic Procedure, Each 15 M

inutes Completed

 

                2020      76370           Hot Or Cold Packs Completed

 

                2020      19859           Therapeutic Procedure, Each 15 M

inutes Completed

 

                2020      45143           Therapeutic Procedure, Each 15 M

inutes Completed

 

                2020      57727           Hot Or Cold Packs Completed

 

                2020      56440           Therapeutic Procedure, Each 15 M

inutes Completed

 

                    2020          63332               Re-Eval Of PT Establ

ished Plan Of Care 20Mins Face To Face 

PT/Fam                                  Completed

 

                2020      85701           Therapeutic Procedure, Each 15 M

inutes Completed

 

                2020      27033           Hot Or Cold Packs Completed

 

                2020      21759           Therapeutic Procedure, Each 15 M

inutes Completed

 

                2020      47790           Hot Or Cold Packs Completed

 

                2020      60864           Therapeutic Procedure, Each 15 M

inutes Completed

 

                2020      88103           Hot Or Cold Packs Completed

 

                2020      02527           Physical Therapy Eval - Low Comp

lexity Completed

 

                2020      37656           Arthroscopy Shoulder Decompress 

Subacromial Part Acromioplasty 

Completed

 

                    2020          14824               Arthroscopy Shoulder

 Removal Loose/Foreign Body Dist 

Claviculecto                            Completed







Medical Devices





                                        Description

 

                                        No Information Available







Encounters





                                        Description

 

                                        No Information Available







Assessments





                Date            Code            Description     Provider

 

                2020      Z47.89          Encounter for other orthopedic a

ftercare Celena Salinase, PTA

 

                2020      Z47.89          Encounter for other orthopedic a

ftercare Sosa Sewell, 

PTA

 

                2020      M25.512         Pain in left shoulder Celena Steel

e, PTA

 

                2020      M25.512         Pain in left shoulder Celena Steel

e, PTA

 

                2020      M25.512         Pain in left shoulder Lars dukes, PT, DPT

 

                2020      M25.512         Pain in left shoulder Celena Steel

e, PTA

 

                2020      M25.512         Pain in left shoulder Sergo Sigala MD

 

                2020      Z47.89          Encounter for other orthopedic a

ftercare Celena Alvarado, PTA

 

                2020      Z47.89          Encounter for other orthopedic a

ftercare Stacey Parker P.T.A.

 

                10/30/2020      Z47.89          Encounter for other orthopedic a

ftercare Celena Alvarado, PTA

 

                10/07/2020      Z47.89          Encounter for other orthopedic a

ftercare Lars Huerta, PT, 

DPT

 

                10/02/2020      Z47.89          Encounter for other orthopedic a

ftercare Celena Alvarado, PTA

 

                2020      Z47.89          Encounter for other orthopedic a

ftercare Lars Huerta, PT, 

DPT

 

                2020      Z47.89          Encounter for other orthopedic a

ftercare Stacey Scee P.T.A.

 

                2020      Z47.89          Encounter for other orthopedic a

ftercare Stacye Scee P.T.A.

 

                2020      Z47.89          Encounter for other orthopedic a

ftercare Stacey Scee P.T.A.

 

                2020      Z47.89          Encounter for other orthopedic a

ftercare Lars Huerta, PT, 

DPT

 

                2020      Z47.89          Encounter for other orthopedic a

ftercare Stacey Scee P.T.A.

 

                2020      Z47.89          Encounter for other orthopedic a

ftercare Lars Huerta, PT, 

DPT

 

                2020      Z47.89          Encounter for other orthopedic a

ftercare Stacey Scee P.T.A.

 

                2020      Z47.89          Encounter for other orthopedic a

ftercare Stacey Scee P.T.A.

 

                2020      Z47.89          Encounter for other orthopedic a

ftercare Celena Alvarado, PTA

 

                2020      Z47.89          Encounter for other orthopedic a

ftercare Lars Huerta, PT, 

DPT

 

                    2020          M75.112             Incomplete rotator c

uff tear or rupture of left shoulder, not

specified as traumatic                  Giancarlo Real PA-C

 

                2020      M75.42          Impingement syndrome of left taqueria

wilfredo Real PA-C

 

                2020      M19.012         Primary osteoarthritis, left taqueria

wilfredo Real, 

YAKOV







Plan of Treatment

Future Appointment(s):* 2020  4:00 pm - Lars Huerta, PT, DPT at 
  Physical Therapy Referral

* 2020  4:00 pm - Sosa Sewell PTA at Physical Therapy Referral

* 2021  4:00 pm - Henry Trivedi MD at [place]





Functional Status





                                        Description

 

                                        No Information Available







Mental Status





                                        Description

 

                                        No Information Available







Referrals





                Refer to      Reason for Referral Status          Appt Date

 

                          Henry Trivedi MD        PT - 8 VISITS [facility]

HLDRS FROM 10/19-20, AUTH 

#[id number], TRACKING #[id number]. SS  Created                   

 

                                        [facility], [address]

 

                                        [place] [id number] [phone]

 

                                          

 

                          Henry Trivedi MD        [id number], [id number] 

DO NOT NEED AUTH, [id number], 

[id number], [id number] AUTH #[id number], SPOKE WITH MARIZA AND SHE WAS ABLE 
TO EXTEND THE AUTH FOR SURGERY FOR ME DATES ARE GOOD FROM 2020-2020.. 
SENT TO JENNY IN SURG SCHED..LD  Created                   

 

                                        [facility] [facility] [address], [address]

 

                                        [place] [id number] [phone]

## 2021-01-27 NOTE — CCD
Summarization of Episode Note

                             Created on: 2020



ADRIÁN SHIN JULES

External Reference #: [id number]

: 1975

Sex: Male



Demographics





                          Address                   [address]

[place]  [address]

 

                          Home Phone                [phone]

 

                          Preferred Language        Unknown

 

                          Marital Status            Unknown

 

                          Scientologist Affiliation     Unknown

 

                          Race                      White

 

                          Ethnic Group              Not  or 





Author





                          Author                    [facility]

ems

 

                          Organization              [facility]

ems

 

                          Address                   Unknown

 

                          Phone                     Unavailable







Support





                Name            Relationship    Address         Phone

 

                    ADRIÁN SHIN         GUAR                [address]

[place], [place]  [address] [phone]

 

                    Ana Shin          ECON                [address]

[place]  [address]                       [phone]







Care Team Providers





                    Care Team Member Name Role                Phone

 

                    Jose Garner         Unavailable         [phone]







PROBLEMS





          Type      Condition ICD9-CM Code GUV64-BD Code Onset Dates Condition S

tatus SNOMED 

Code                                    Notes

 

        Problem Neurogenic bladder         N31.9           Active  296443936  

 

        Problem Body mass index (BMI) 34.0-34.9, adult         Z68.34          A

ctive  908648668  

 

        Problem Urge incontinence         N39.41          Active  65576885  

 

        Problem Exercise-induced asthma         J45.990         Active  50068523

  

 

                Problem         Gastroesophageal reflux disease, esophagitis pre

sence not specified                 

K21.9                           Active          [id number]       He is on omepraz

ole with control of his symptoms. He 

has seen a gastroenterologist in the past. No records available.

 

          Problem   Major depressive disorder, single episode, unspecified      

     F32.9               Active    

[id number]                                 

 

           Problem    Chronic obstructive pulmonary disease, unspecified COPD ty

pe            J44.9                 

Active                    74773253                  He apparently carries this d

iagnosis and is maintained on 

Spiriva and Breo. No active exacerbation. Unfortunately he is still smoking. No 
PFTs are readily available in the medical record. He has been advised to quit 
smoking.

 

        Problem Anxiety disorder, unspecified         F41.9           Active  23

9090683 He is on 

clonidine and high-dose fluoxetine. Symptoms are palliated.

 

        Problem Chronic kidney disease, stage 3 (moderate)         N18.3        

   Active  775454562 

Most recent lab work in 2019 was not remarkable.

 

        Problem Lumbar degenerative disc disease         M51.36          Active 

 84434112  

 

        Problem Insomnia due to other mental disorder         F51.05          Ac

tive  72231842  

 

        Problem Obesity, unspecified         E66.9           Active  533435737  

 

        Problem Feeling of incomplete bladder emptying         R39.14          A

ctive  769862699  

 

        Problem Mixed hyperlipidemia         E78.2           Active  071119729 T

reated by his primary 

provider with lovastatin. I cannot locate a recent lipid profile.

 

        Problem Post-traumatic stress disorder, unspecified         F43.10      

    Active  74152593  

 

        Problem Generalized anxiety disorder         F41.1           Active  218

55133  

 

          Problem   Major depressive disorder, recurrent, moderate           F33

.1               Active    

[id number]                                

 

        Problem Mental disorder, not otherwise specified         F99            

 Active  76128714  







ALLERGIES





                    Allergen (clinical drug ingredient) Drug/Non Drug Allergy do

cumented on EMR 

Reaction            Allergy Type        Onset Date          Status

 

                      Feathers   Unknown    Non Drug Allergy            Active

 

                      Effexor    Excessive Emotions Drug Allergy            Acti

ve

 

           escitalopram Lexapro(NDC Code:[address]-53) Fatigue    Drug Allergy 

           Active

 

                      Wellbutrin Aggressive Drug Allergy            Active







ENCOUNTERS from 1975 to 2020





             Encounter    Location     Date         Provider     Diagnosis

 

                [facility] [address] [place] [address]

 10 Dec, 2020    

Jose Garner                               Generalized anxiety disorder F41.1 ; Fab

or depressive disorder, 

recurrent, moderate F33.1 and Post-traumatic stress disorder, unspecified F43.10







IMMUNIZATIONS

No Information



SOCIAL HISTORY

Tobacco Use:



                    Social History Observation Description         Date

 

                    Details (start date - stop date) Current Smoker       



Sex Assigned At Birth:



                          Social History Observation Description

 

                          Sex Assigned At Birth     Unknown



Education:



                    Question            Answer              Notes

 

                    Level of Education: High School          



Audit



                    Question            Answer              Notes

 

                    Total Score:        1                    

 

                    Interpretation:     Alcohol Education    



Language:



                    Question            Answer              Notes

 

                    Languages spoken:   English              

 

                    Languages spoken:   English              



Sikhism:



                    Question            Answer              Notes

 

                    Sikhism                                No Denominational beliefs

 that would impact health care.

 

                    Sikhism            [employer]    



Sexual Hx:



                    Question            Answer              Notes

 

                    Had sex in the last 12 months (vaginal, oral, or anal)? Yes 

                 

 

                    Have you ever had an STD? No                   

 

                    with                Women only           

 

                    Use protection?     No                   



Drug and Alcohol



                    Question            Answer              Notes

 

                    Total Score:        0                    

 

                    Interpretation:     No problems reported  



Alcohol Screening:



                    Question            Answer              Notes

 

                    Did you have a drink containing alcohol in the past year? Ye

s                  

 

                    Points              3                    

 

                    Interpretation      Negative             

 

                          How often did you have six or more drinks on one occas

ion in the past year? 

Never (0 points)                         

 

                                        How many drinks did you have on a typica

l day when you were drinking in the past

year?                     1 or 2 (0 points)          

 

                          How often did you have a drink containing alcohol in t

he past year? Two to three

times per week (3 points)                



Tobacco Use:



                    Question            Answer              Notes

 

                    Are you a:          Uses tobacco in other forms SMOKE PIPE A

BOUT 10 CIGARETTES A DAY

 

                    Are you a:          current smoker       

 

                    Are you interested in quitting? Ready to quit        

 

                    Counseled the patient on tobacco use, cessation provided 10/

           







REASON FOR REFERRAL

No Information



VITAL SIGNS

No information



MEDICATIONS





           Medication SIG (Take, Route, Frequency, Duration) Notes      Start Da

te End Date   

Status

 

           Breo Ellipta 200-25 MCG/INH 1 puff Inhalation Once a day             

                     Active

 

           HydrOXYzine HCl 10 MG 2 tabs Orally before bedtime for 30            

                      Active

 

                Acetaminophen 500 MG 1 capsule as needed Orally every 6 hrs for 

7 day(s)                 19 

Dec, 2019                                           Active

 

           Mirtazapine 7.5 MG tablets at bedtime Orally before bedtime for 30   

                               Active

 

           Nystatin 839175 UNIT/GM as directed Externally B)Feet Once a day     

                             Active

 

           Omeprazole 40 MG 1 capsule Orally Daily for 30                       

           Active

 

           FLUoxetine HCl 40 MG 1 capsule orally Daily for 90 day(s)            

                      Active

 

           FLUoxetine HCl 10 MG 1 capsule Orally Daily for 90 day(s)            

                      Active

 

                          Ventolin  (90 Base) MCG/ACT 1 puff as needed In

halation every 4 hours as 

needed                                                          Active

 

           Mucinex 600 MG 1 tab orally bid for 30 Days                          

        Active

 

             Clonidine HCl 0.2 MG 1 tablet Orally before bedtime for 30 Days    

          13 Dec, 2019  

                                        Active

 

           Lovastatin 20 MG 1 tab orally Daily for 30                           

       Active

 

                          Albuterol Sulfate (2.5 MG/3ML) 0.083% 1 vial Inhalatio

n every 6 hrs as needed 

for 30 days                                                     Active

 

           Incruse Ellipta 62.5 MCG/INH 1 puff Inhalation Once a day            

                      Active







PROCEDURES

No Information



RESULTS

No Results



REASON FOR VISIT

follow up



MEDICAL (GENERAL) HISTORY





                    Type                Description         Date

 

                    Surgical History    R)knee surgery      

 

                    Surgical History    L)knee surgery      

 

                    Surgical History    s/p B Inguinal Hernia Repair- Dr. Foster

2006

 

                    Surgical History    Inguinal Hernia Repair 

 

                    Surgical History    s/p REctal polyps removed Dr. Szymanski 



 

                    Surgical History    LEFT ELBOW PINCHED NERVE 

 

                    Surgical History    R) carpal tunnel repair 19

 

                    Surgical History    Cystoscopy apparently with Botox instill

ation?-Dr. Perez 

2019

 

                    Surgical History    Left carpal tunnel repair 6/3/2020

 

                    Surgical History    R)shoulder surgery debridement 2020







Goals Section

No Information



Health Concerns

No Information



MEDICAL EQUIPMENT

No Information



MENTAL STATUS

No Information



FUNCTIONAL STATUS

No Information



ASSESSMENTS





             Encounter Date Diagnosis    Assessment Notes Treatment Notes Treatm

ent Clinical 

Notes

 

                10 Dec, 2020    Generalized anxiety disorder (ICD-10 - F41.1)   

              



                                        





 

                10 Dec, 2020    Major depressive disorder, recurrent, moderate (

ICD-10 - F33.1)                 



                                        





 

                10 Dec, 2020    Post-traumatic stress disorder, unspecified (ICD

-10 - F43.10)                 



                                        





 

             10 Dec, 2020 Other                                  Utilized CBT to

 review a thought reframing technique for 

decreasing his symptoms of anxiety. Adrián arrived on time for his appointment 
and actively participated in his treatment plan review. Adrián was responsive to 
the CBT thought reframing intervention for decreasing his symptoms of anxiety. 
Adrián reports that he has had an increase in his symptoms of anxiety as the 
result of pressure on him to complete multiple tasks all the time. Adrián agreed 
to utilize the technique that we role played and will return for psychotherapy 
on 2020. Adrián is aware to call for an earlier appointment or utilize the 
ED for MH emergencies.







PLAN OF TREATMENT

Medication



                Medication Name Sig             Start Date      Stop Date

 

                Mirtazapine 7.5 MG tablets at bedtime Orally before bedtime for 

30                  

 

                Lovastatin 20 MG 1 tab orally Daily for 30                  

 

                FLUoxetine HCl 10 MG 1 capsule Orally Daily for 90 day(s)       

           

 

                HydrOXYzine HCl 10 MG 2 tabs Orally before bedtime for 30       

           

 

                Incruse Ellipta 62.5 MCG/INH 1 puff Inhalation Once a day       

           

 

                FLUoxetine HCl 40 MG 1 capsule orally Daily for 90 day(s)       

           

 

                Breo Ellipta 200-25 MCG/INH 1 puff Inhalation Once a day        

          

 

                          Ventolin  (90 Base) MCG/ACT 1 puff as needed In

halation every 4 hours as 

needed                                               

 

                Omeprazole 40 MG 1 capsule Orally Daily for 30                  



Next Appt



                                        Details

 

                                        Provider Name:Jose Garner 2020 03:0

0:00 PM, [address] [place], [address]







Insurance Providers





             Payer Name   Payer Address Payer Phone  Insured Name Patient Relati

onship to 

Insured                   Coverage Start Date       Coverage End Date

 

                [facility]         [employer] DEPT PO  [facility] [address]

[phone]    

ADRIÁN SHIN

## 2021-01-27 NOTE — CCD
Continuity of Care Document (CCD)

                             Created on: 2020



Adrián Shin

External Reference #: [id number]

: 1975

Sex: Male



Demographics





                          Address                   [address]

[place]  [address]

 

                          Home Phone                [phone]

 

                          Preferred Language        Unknown

 

                          Marital Status            Unknown

 

                          Zoroastrianism Affiliation     Unknown

 

                          Race                      White

 

                          Ethnic Group              Not  or 





Author





                          Author                    Adrián SEWELL [facility]

 

                          Organization              Unknown

 

                          Address                   [address] [place] [address]  [address]



 

                          Phone                     [phone]







Care Team Providers





                    Care Team Member Name Role                Phone

 

                    Wendi Martinez MD AUTM                [phone]

 

                    Zachary Chakraborty MD      AUTM                [phone]







Problems





                    Active Problems     Provider            Date

 

                    Pure hypercholesterolemia Henry Trivedi MD  Onset: 10/07/2

019







Social History





                Type            Date            Description     Comments

 

                Birth Sex                       Unknown          

 

                ETOH Use                        Occasionally consumes alcohol  

 

                Tobacco Use     Start: Unknown  Patient is a current smoker, smo

kes every day smokes 

a pipe 







Allergies, Adverse Reactions, Alerts





             Active Allergies Reaction     Severity     Comments     Date

 

             Effexor                                             10/07/2019

 

             Wellbutrin                                          10/07/2019

 

             Lexapro                                             10/07/2019







Medications





           Active Medications SIG        Qnty       Indications Ordering Provide

r Date

 

                          Tramadol HCL                     50mg Tablets         

          1 tab po every 

4-6 hours as needed pain after surgery(please do not fill until 6/3/2020). 

10tabs                                  Henry Trivedi MD  2020

 

                          Ventolin HFA                     108(90Base) mcg/Act A

erosol                    

                                                Henry Trivedi MD 2019

 

             Breo Ellipta                     200-25mcg/Inh Aerosol             

                                             

Unknown                                 

 

                Spiriva Respimat                     1.25mcg/Act Aerosol        

                                            

                          Unknown                   

 

                          Fluoxetine HCL                     40mg Capsules      

             1 by mouth 

every day                                       Unknown         

 

           Omeprazole                     40mg Capsules DR                      

                              Unknown    



 

                                        Albuterol Sulfate HFA                   

  108(90Base) mcg/Act Aerosol           

                                                    Unknown      

 

           Trazodone HCL                     50mg Tablets                       

                             Unknown    



 

             Clonidine                     0.3mg/24HR Patches Weekly            

                                              

Unknown                                 

 

             Hydroxyzine HCL                     10mg Tablets                   

                                       Unknown

                                        

 

                    Lovastatin                     20mg Tablets                 

  1 by mouth a day     

                                        Unknown             







Immunizations





                                        Description

 

                                        No Information Available







Vital Signs





                Date            Vital           Result          Comment

 

                2019  2:32pm Body Temperature 97.7 F         

 

                2019 10:35am Body Temperature 97.7 F         

 

                    Height              65.5 inches         5'5.50"

 

                    Weight              188.00 lb            

 

                    BMI (Body Mass Index) 30.8 kg/m2           







Results





                                        Description

 

                                        No Information Available







Procedures





                Date            Code            Description     Status

 

                2020      04268           Therapeutic Procedure, Each 15 M

inutes Completed

 

                2020      00969           Therapeutic Procedure, Each 15 M

inutes Completed

 

                2020      31917           Hot Or Cold Packs Completed

 

                2020      01593           Therapeutic Procedure, Each 15 M

inutes Completed

 

                2020      71138           Therapeutic Procedure, Each 15 M

inutes Completed

 

                2020      65171           Therapeutic Procedure, Each 15 M

inutes Completed

 

                2020      61432           Therapeutic Procedure, Each 15 M

inutes Completed

 

                2020      47171           Therapeutic Procedure, Each 15 M

inutes Completed

 

                2020      54861           Hot Or Cold Packs Completed

 

                2020      35437           X-Ray Shoulder Complete Complete

d

 

                2020      95719           Hot Or Cold Packs Completed

 

                2020      75219           Therapeutic Procedure, Each 15 M

inutes Completed

 

                2020      84032           Therapeutic Procedure, Each 15 M

inutes Completed

 

                10/30/2020      75953           Therapeutic Procedure, Each 15 M

inutes Completed

 

                10/30/2020      34896           Hot Or Cold Packs Completed

 

                    10/07/2020          90741               Re-Eval Of PT Establ

ished Plan Of Care 20Mins Face To Face 

PT/Fam                                  Completed

 

                10/07/2020      46356           Therapeutic Procedure, Each 15 M

inutes Completed

 

                10/02/2020      16478           Therapeutic Procedure, Each 15 M

inutes Completed

 

                2020      72926           Therapeutic Procedure, Each 15 M

inutes Completed

 

                2020      46297           Hot Or Cold Packs Completed

 

                2020      59642           Therapeutic Procedure, Each 15 M

inutes Completed

 

                2020      94506           Therapeutic Procedure, Each 15 M

inutes Completed

 

                2020      55288           Hot Or Cold Packs Completed

 

                2020      14945           Therapeutic Procedure, Each 15 M

inutes Completed

 

                2020      91558           Hot Or Cold Packs Completed

 

                2020      08330           Therapeutic Procedure, Each 15 M

inutes Completed

 

                2020      74827           Therapeutic Procedure, Each 15 M

inutes Completed

 

                2020      33550           Hot Or Cold Packs Completed

 

                2020      78679           Therapeutic Procedure, Each 15 M

inutes Completed

 

                    2020          27864               Re-Eval Of PT Establ

ished Plan Of Care 20Mins Face To Face 

PT/Fam                                  Completed

 

                2020      24587           Therapeutic Procedure, Each 15 M

inutes Completed

 

                2020      53323           Hot Or Cold Packs Completed

 

                2020      58106           Therapeutic Procedure, Each 15 M

inutes Completed

 

                2020      29496           Hot Or Cold Packs Completed

 

                2020      07327           Therapeutic Procedure, Each 15 M

inutes Completed

 

                2020      81758           Hot Or Cold Packs Completed

 

                2020      82160           Physical Therapy Eval - Low Comp

lexity Completed

 

                2020      09659           Arthroscopy Shoulder Decompress 

Subacromial Part Acromioplasty 

Completed

 

                    2020          32693               Arthroscopy Shoulder

 Removal Loose/Foreign Body Dist 

Claviculecto                            Completed







Medical Devices





                                        Description

 

                                        No Information Available







Encounters





                                        Description

 

                                        No Information Available







Assessments





                Date            Code            Description     Provider

 

                2020      Z47.89          Encounter for other orthopedic a

ftercare Danamarie Ortolano, 

PTA

 

                2020      Z47.89          Encounter for other orthopedic a

ftercare Celena Alvarado, PTA

 

                2020      Z47.89          Encounter for other orthopedic a

ftercare Danamarie Ortolano, 

PTA

 

                2020      M25.512         Pain in left shoulder Celena Steel

e, PTA

 

                2020      M25.512         Pain in left shoulder Celena Steel

e, PTA

 

                2020      M25.512         Pain in left shoulder Lars dukes, PT, DPT

 

                2020      M25.512         Pain in left shoulder Celena Steel

e, PTA

 

                2020      M25.512         Pain in left shoulder Sergo Sigala MD

 

                2020      Z47.89          Encounter for other orthopedic a

ftercare Celena Alvarado, PTA

 

                2020      Z47.89          Encounter for other orthopedic a

ftercare Stacey Parker P.T.A.

 

                10/30/2020      Z47.89          Encounter for other orthopedic a

ftercare Celena Alvarado, PTA

 

                10/07/2020      Z47.89          Encounter for other orthopedic a

ftercare Lars HEATON Deanna, PT, 

DPT

 

                10/02/2020      Z47.89          Encounter for other orthopedic a

ftercare Celena Alvarado, PTA

 

                2020      Z47.89          Encounter for other orthopedic a

ftercare Lars HEATON Deanna, PT, 

DPT

 

                2020      Z47.89          Encounter for other orthopedic a

ftercare Stacey Scee P.T.A.

 

                2020      Z47.89          Encounter for other orthopedic a

ftercare Stacey Scee P.T.A.

 

                2020      Z47.89          Encounter for other orthopedic a

ftercare Stacey Scee P.T.A.

 

                2020      Z47.89          Encounter for other orthopedic a

ftercare Lars RIVERAKirsten Huerta, PT, 

DPT

 

                2020      Z47.89          Encounter for other orthopedic a

ftercare Stacey Scee P.T.A.

 

                2020      Z47.89          Encounter for other orthopedic a

ftercare Lars RIVERAKirsten Huerta, PT, 

DPT

 

                2020      Z47.89          Encounter for other orthopedic a

ftercare Stacey Scee P.T.A.

 

                2020      Z47.89          Encounter for other orthopedic a

ftercare Stacey Scee P.T.A.

 

                2020      Z47.89          Encounter for other orthopedic a

ftercare Celena Alvarado, PTA

 

                2020      Z47.89          Encounter for other orthopedic a

ftercare Lars RIVERAKirsten Huerta, PT, 

DPT

 

                    2020          M75.112             Incomplete rotator c

uff tear or rupture of left shoulder, not

specified as traumatic                  Giancarlo Real PA-C

 

                2020      M75.42          Impingement syndrome of left taqueria

wilfredo Real PA-C

 

                2020      M19.012         Primary osteoarthritis, left taqueria

wilfredo Real PA-C







Plan of Treatment

Future Appointment(s):* 2020  4:00 pm - Lars Huerta, PT, DPT at 
  Physical Therapy Referral

* 2020  4:00 pm - Sosa Sewell PTA at Physical Therapy Referral

* 2021  4:00 pm - Henry Trivedi MD at [facility]





Functional Status





                                        Description

 

                                        No Information Available







Mental Status





                                        Description

 

                                        No Information Available







Referrals





                Refer to Dr     Reason for Referral Status          Appt Date

 

                Henry Trivedi MD PT ALLOWED EVAL THEN NEEDS AUTH TO PT DEPT. N

T  Created         



 

                                        [facility], [address] [place] [address]

 

                                        [phone]

 

                                          

 

                          Henry Trivedi MD        PT - 8 VISITS GOOD FOR CLAIRE S

HLDRS FROM 10/19-20, AUTH 

#[id number], TRACKING #[id number]. SS  Created                   

 

                                        [facility], [address] [place] [address]

 

                                        [phone]

 

                                          

 

                          Henry Trivedi MD        [id number], [id number] 

DO NOT NEED AUTH, [id number], 

[id number], [id number] AUTH #[id number], SPOKE WITH MARIZA AND SHE WAS ABLE 
TO EXTEND THE AUTH FOR SURGERY FOR ME DATES ARE GOOD FROM 2020-2020.. 
SENT TO JENNY IN SURG SCHED..LD  Created                   

 

                                        [facility], [address] [place] [address]

 

                                        [phone]

## 2021-01-27 NOTE — CCD
Continuity of Care Document (CCD)

                             Created on: 12/15/2020



Adrián Shin

External Reference #: [id number]

: 1975

Sex: Male



Demographics





                          Address                   [address]

[place]  [address]

 

                          Home Phone                [phone]

 

                          Preferred Language        Unknown

 

                          Marital Status            Unknown

 

                          Scientologist Affiliation     Unknown

 

                          Race                      White

 

                          Ethnic Group              Not  or 





Author





                          Author                    [facility]

 

                          Organization              Unknown

 

                          Address                   [address] [place] [address]  [address]



 

                          Phone                     [phone]







Care Team Providers





                    Care Team Member Name Role                Phone

 

                    Wendi Martinez MD AUTM                [phone]

 

                    Zachary Chakraborty MD      AUTM                [phone]







Problems





                    Active Problems     Provider            Date

 

                    Pure hypercholesterolemia Henry Trivedi MD  Onset: 10/07/2

019







Social History





                Type            Date            Description     Comments

 

                Birth Sex                       Unknown          

 

                ETOH Use                        Occasionally consumes alcohol  

 

                Tobacco Use     Start: Unknown  Patient is a current smoker, smo

kes every day smokes 

a pipe 







Allergies, Adverse Reactions, Alerts





             Active Allergies Reaction     Severity     Comments     Date

 

             Effexor                                             10/07/2019

 

             Wellbutrin                                          10/07/2019

 

             Lexapro                                             10/07/2019







Medications





           Active Medications SIG        Qnty       Indications Ordering Provide

r Date

 

                          Tramadol HCL                     50mg Tablets         

          1 tab po every 

4-6 hours as needed pain after surgery(please do not fill until 6/3/2020). 

10tabs                                  Henry Trivedi MD  2020

 

                          Ventolin HFA                     108(90Base) mcg/Act A

erosol                    

                                                Henry Trivedi MD 2019

 

             Breo Ellipta                     200-25mcg/Inh Aerosol             

                                             

Unknown                                 

 

                Spiriva Respimat                     1.25mcg/Act Aerosol        

                                            

                          Unknown                   

 

                          Fluoxetine HCL                     40mg Capsules      

             1 by mouth 

every day                                       Unknown         

 

           Omeprazole                     40mg Capsules DR                      

                              Unknown    



 

                                        Albuterol Sulfate HFA                   

  108(90Base) mcg/Act Aerosol           

                                                    Unknown      

 

           Trazodone HCL                     50mg Tablets                       

                             Unknown    



 

             Clonidine                     0.3mg/24HR Patches Weekly            

                                              

Unknown                                 

 

             Hydroxyzine HCL                     10mg Tablets                   

                                       Unknown

                                        

 

                    Lovastatin                     20mg Tablets                 

  1 by mouth a day     

                                        Unknown             







Immunizations





                                        Description

 

                                        No Information Available







Vital Signs





                Date            Vital           Result          Comment

 

                2019  2:32pm Body Temperature 97.7 F         

 

                2019 10:35am Body Temperature 97.7 F         

 

                    Height              65.5 inches         5'5.50"

 

                    Weight              188.00 lb            

 

                    BMI (Body Mass Index) 30.8 kg/m2           







Results





                                        Description

 

                                        No Information Available







Procedures





                Date            Code            Description     Status

 

                2020      42884           Therapeutic Procedure, Each 15 M

inutes Completed

 

                2020      88446           Hot Or Cold Packs Completed

 

                2020      75324           Therapeutic Procedure, Each 15 M

inutes Completed

 

                2020      74666           Therapeutic Procedure, Each 15 M

inutes Completed

 

                2020      10071           Therapeutic Procedure, Each 15 M

inutes Completed

 

                2020      58410           Therapeutic Procedure, Each 15 M

inutes Completed

 

                2020      78074           Therapeutic Procedure, Each 15 M

inutes Completed

 

                2020      04967           Hot Or Cold Packs Completed

 

                2020      53782           X-Ray Shoulder Complete Complete

d

 

                2020      22421           Hot Or Cold Packs Completed

 

                2020      22382           Therapeutic Procedure, Each 15 M

inutes Completed

 

                2020      26154           Therapeutic Procedure, Each 15 M

inutes Completed

 

                10/30/2020      67393           Therapeutic Procedure, Each 15 M

inutes Completed

 

                10/30/2020      19421           Hot Or Cold Packs Completed

 

                    10/07/2020          04642               Re-Eval Of PT Establ

ished Plan Of Care 20Mins Face To Face 

PT/Fam                                  Completed

 

                10/07/2020      32751           Therapeutic Procedure, Each 15 M

inutes Completed

 

                10/02/2020      28256           Therapeutic Procedure, Each 15 M

inutes Completed

 

                2020      99728           Therapeutic Procedure, Each 15 M

inutes Completed

 

                2020      20023           Hot Or Cold Packs Completed

 

                2020      38940           Therapeutic Procedure, Each 15 M

inutes Completed

 

                2020      49643           Therapeutic Procedure, Each 15 M

inutes Completed

 

                2020      37273           Hot Or Cold Packs Completed

 

                2020      73054           Therapeutic Procedure, Each 15 M

inutes Completed

 

                2020      52446           Hot Or Cold Packs Completed

 

                2020      48699           Therapeutic Procedure, Each 15 M

inutes Completed

 

                2020      70022           Therapeutic Procedure, Each 15 M

inutes Completed

 

                2020      78920           Hot Or Cold Packs Completed

 

                2020      66874           Therapeutic Procedure, Each 15 M

inutes Completed

 

                    2020          25141               Re-Eval Of PT Establ

ished Plan Of Care 20Mins Face To Face 

PT/Fam                                  Completed

 

                2020      92082           Therapeutic Procedure, Each 15 M

inutes Completed

 

                2020      44598           Hot Or Cold Packs Completed

 

                2020      32535           Therapeutic Procedure, Each 15 M

inutes Completed

 

                2020      84486           Hot Or Cold Packs Completed

 

                2020      49418           Therapeutic Procedure, Each 15 M

inutes Completed

 

                2020      49878           Hot Or Cold Packs Completed

 

                2020      64550           Physical Therapy Eval - Low Comp

lexity Completed

 

                2020      53911           Arthroscopy Shoulder Decompress 

Subacromial Part Acromioplasty 

Completed

 

                    2020          54648               Arthroscopy Shoulder

 Removal Loose/Foreign Body Dist 

Claviculecto                            Completed







Medical Devices





                                        Description

 

                                        No Information Available







Encounters





                                        Description

 

                                        No Information Available







Assessments





                Date            Code            Description     Provider

 

                2020      Z47.89          Encounter for other orthopedic a

ftercare Celena Salinase, PTA

 

                2020      Z47.89          Encounter for other orthopedic a

ftercare Sosa Bell, 

PTA

 

                2020      M25.512         Pain in left shoulder Celena Steel

e, PTA

 

                2020      M25.512         Pain in left shoulder Celena Steel

e, PTA

 

                2020      M25.512         Pain in left shoulder Lars dukes, PT, DPT

 

                2020      M25.512         Pain in left shoulder Celena Steel

e, PTA

 

                2020      M25.512         Pain in left shoulder Sergo Sigala MD

 

                2020      Z47.89          Encounter for other orthopedic a

ftercare Celena Alvarado, PTA

 

                2020      Z47.89          Encounter for other orthopedic a

ftercare Stacey Parker P.T.A.

 

                10/30/2020      Z47.89          Encounter for other orthopedic a

ftercare Celena Alvarado, PTA

 

                10/07/2020      Z47.89          Encounter for other orthopedic a

ftercare Lars Huerta, PT, 

DPT

 

                10/02/2020      Z47.89          Encounter for other orthopedic a

ftercare Celena Salinase, PTA

 

                2020      Z47.89          Encounter for other orthopedic a

ftercare Lars RIVERAKirsten Huerta, PT, 

DPT

 

                2020      Z47.89          Encounter for other orthopedic a

ftercare Stacey Scee P.T.A.

 

                2020      Z47.89          Encounter for other orthopedic a

ftercare Stacey Scee P.T.A.

 

                2020      Z47.89          Encounter for other orthopedic a

ftercare Stacey Scee P.T.A.

 

                2020      Z47.89          Encounter for other orthopedic a

ftercare Lars MIGUELKirsten Huerta, PT, 

DPT

 

                2020      Z47.89          Encounter for other orthopedic a

ftercare Stacey Scee P.T.A.

 

                2020      Z47.89          Encounter for other orthopedic a

ftercare Lars MIGUELKirsten Huerta, PT, 

DPT

 

                2020      Z47.89          Encounter for other orthopedic a

ftercare Stacey Scee P.T.A.

 

                2020      Z47.89          Encounter for other orthopedic a

ftercare Stacey Scee P.T.A.

 

                2020      Z47.89          Encounter for other orthopedic a

ftercare Celena Alvarado, PTA

 

                2020      Z47.89          Encounter for other orthopedic a

ftercare Lars MIGUELKirsten Huerta, PT, 

DPT

 

                    2020          M75.112             Incomplete rotator c

uff tear or rupture of left shoulder, not

specified as traumatic                  Giancarlo Real PA-C

 

                2020      M75.42          Impingement syndrome of left taqueria

wilfredo Real PA-C

 

                2020      M19.012         Primary osteoarthritis, left taqueria

wilfredo Real PA-C

 

                2020      Z47.89          Encounter for other orthopedic a

ftercare Codi Sigala PA-C







Plan of Treatment

Future Appointment(s):* 2020  3:30 pm - Stacey Scee P.T.A. at Physical 
  Therapy Referral





Functional Status





                                        Description

 

                                        No Information Available







Mental Status





                                        Description

 

                                        No Information Available







Referrals





                Refer to Dr     Reason for Referral Status          Appt Date

 

                          Henry Trivedi MD        PT - 8 VISITS [facility]

HLDRS FROM 10/19-20, AUTH 

#[id number], TRACKING #[id number]. SS  Created                   

 

                                        [address], [address]

 

                                        [place] [address] [phone]

 

                                          

 

                          Henry Trivedi MD        [id number], [id number] 

DO NOT NEED AUTH, [id number], 

[id number], [id number] AUTH #[id number], SPOKE WITH MARIZA AND SHE WAS ABLE 
TO EXTEND THE AUTH FOR SURGERY FOR ME DATES ARE GOOD FROM 2020-2020.. 
SENT TO [facility] IN SURG SCHED..LD  Created                   

 

                                        [address], [address]

 

                                        [place] [address] [phone]

## 2021-01-27 NOTE — CCD
Continuity of Care Document (CCD)

                             Created on: 2020



Adrián Shin

External Reference #: [id number]

: 1975

Sex: Male



Demographics





                          Address                   [address]

[place]  [address]

 

                          Home Phone                [phone]

 

                          Preferred Language        Unknown

 

                          Marital Status            Unknown

 

                          Tenriism Affiliation     Unknown

 

                          Race                      White

 

                          Ethnic Group              Not  or 





Author





                          Author                    Adrián VALENTIN DPT

 

                          Organization              Unknown

 

                          Address                   [address] [place] [address] [address]

[place]  [address]



 

                          Phone                     [phone]







Care Team Providers





                    Care Team Member Name Role                Phone

 

                    Wendi Martinez MD AUTM                [phone]

 

                    Zachary Chakraborty MD      AUTM                [phone]







Problems





                    Active Problems     Provider            Date

 

                    Pure hypercholesterolemia Henry Trivedi MD  Onset: 10/07/2

019







Social History





                Type            Date            Description     Comments

 

                Birth Sex                       Unknown          

 

                ETOH Use                        Occasionally consumes alcohol  

 

                Tobacco Use     Start: Unknown  Patient is a current smoker, smo

kes every day smokes 

a pipe 







Allergies, Adverse Reactions, Alerts





             Active Allergies Reaction     Severity     Comments     Date

 

             Effexor                                             10/07/2019

 

             Wellbutrin                                          10/07/2019

 

             Lexapro                                             10/07/2019







Medications





           Active Medications SIG        Qnty       Indications Ordering Provide

r Date

 

                          Tramadol HCL                     50mg Tablets         

          1 tab po every 

4-6 hours as needed pain after surgery(please do not fill until 6/3/2020). 

10tabs                                  Henry Trivedi MD  2020

 

                          Ventolin HFA                     108(90Base) mcg/Act A

erosol                    

                                                Henry Trivedi MD 2019

 

             Breo Ellipta                     200-25mcg/Inh Aerosol             

                                             

Unknown                                 

 

                Spiriva Respimat                     1.25mcg/Act Aerosol        

                                            

                          Unknown                   

 

                          Fluoxetine HCL                     40mg Capsules      

             1 by mouth 

every day                                       Unknown         

 

           Omeprazole                     40mg Capsules DR                      

                              Unknown    



 

                                        Albuterol Sulfate HFA                   

  108(90Base) mcg/Act Aerosol           

                                                    Unknown      

 

           Trazodone HCL                     50mg Tablets                       

                             Unknown    



 

             Clonidine                     0.3mg/24HR Patches Weekly            

                                              

Unknown                                 

 

             Hydroxyzine HCL                     10mg Tablets                   

                                       Unknown

                                        

 

                    Lovastatin                     20mg Tablets                 

  1 by mouth a day     

                                        Unknown             







Immunizations





                                        Description

 

                                        No Information Available







Vital Signs





                Date            Vital           Result          Comment

 

                2019  2:32pm Body Temperature 97.7 F         

 

                2019 10:35am Body Temperature 97.7 F         

 

                    Height              65.5 inches         5'5.50"

 

                    Weight              188.00 lb            

 

                    BMI (Body Mass Index) 30.8 kg/m2           







Results





        Test    Acquired Date Facility Test    Result  H/L     Range   Note

 

                    Coronavirus 2019 Nasopharygeal 2020          [address] [address]

           (315)-   - Coronavirus 2019 Nasopharygeal Testing was perf <SEE NOTE>

                        1







                          1                         Testing was performed using 

the hollie(R) SARS-CoV-2 test.

This test was developed and its performance characteristics

determined by [facility]. This test has not been

FDA cleared or approved. This test has been authorized by

FDA under an Emergency Use Authorization (EUA). This test

is only authorized for the duration of time the declaration

that circumstances exist justifying the authorization of

the emergency use of in vitro diagnostic tests for

detection of SARS-CoV-2 virus and/or diagnosis of COVID-19

infection under section 564(b)(1) of the Act, 21 U.S.C.

                                        360bbb-3(b)(1), unless the authorization

 is terminated or

revoked sooner. When diagnostic testing is negative, the

possibility of a false negative result should be considered

in the context of a patient's recent exposures and the

presence of clinical signs and symptoms consistent with

COVID-19. An individual without symptoms of COVID-19 and

who is not shedding SARS-CoV-2 virus would expect to have a

negative (not detected) result in this assay.

Performed at:  [address]  [id number]

: Araceli B Reyes MD, Phone:  [phone]



Not Detected











Procedures





                Date            Code            Description     Status

 

                2020      95446           Therapeutic Procedure, Each 15 M

inutes Completed

 

                2020      77557           Therapeutic Procedure, Each 15 M

inutes Completed

 

                2020      87485           Hot Or Cold Packs Completed

 

                2020      74499           X-Ray Shoulder Complete Complete

d

 

                2020      32686           Therapeutic Procedure, Each 15 M

inutes Completed

 

                2020      73833           Hot Or Cold Packs Completed

 

                2020      24133           Therapeutic Procedure, Each 15 M

inutes Completed

 

                10/30/2020      87604           Therapeutic Procedure, Each 15 M

inutes Completed

 

                10/30/2020      63475           Hot Or Cold Packs Completed

 

                10/07/2020      66784           Therapeutic Procedure, Each 15 M

inutes Completed

 

                    10/07/2020          46211               Re-Eval Of PT Establ

ished Plan Of Care 20Mins Face To Face 

PT/Fam                                  Completed

 

                10/02/2020      35588           Therapeutic Procedure, Each 15 M

inutes Completed

 

                2020      52706           Therapeutic Procedure, Each 15 M

inutes Completed

 

                2020      09446           Therapeutic Procedure, Each 15 M

inutes Completed

 

                2020      57668           Hot Or Cold Packs Completed

 

                2020      45512           Therapeutic Procedure, Each 15 M

inutes Completed

 

                2020      56516           Hot Or Cold Packs Completed

 

                2020      30692           Hot Or Cold Packs Completed

 

                2020      54005           Therapeutic Procedure, Each 15 M

inutes Completed

 

                2020      34190           Therapeutic Procedure, Each 15 M

inutes Completed

 

                2020      97242           Therapeutic Procedure, Each 15 M

inutes Completed

 

                2020      06953           Hot Or Cold Packs Completed

 

                    2020          40676               Re-Eval Of PT Establ

ished Plan Of Care 20Mins Face To Face 

PT/Fam                                  Completed

 

                2020      79121           Therapeutic Procedure, Each 15 M

inutes Completed

 

                2020      59455           Therapeutic Procedure, Each 15 M

inutes Completed

 

                2020      99162           Hot Or Cold Packs Completed

 

                2020      93374           Hot Or Cold Packs Completed

 

                2020      40092           Therapeutic Procedure, Each 15 M

inutes Completed

 

                2020      10874           Therapeutic Procedure, Each 15 M

inutes Completed

 

                2020      74311           Hot Or Cold Packs Completed

 

                2020      22265           Physical Therapy Eval - Low Comp

lexity Completed

 

                2020      27067           Arthroscopy Shoulder Decompress 

Subacromial Part Acromioplasty 

Completed

 

                    2020          97235               Arthroscopy Shoulder

 Removal Loose/Foreign Body Dist 

Claviculecto                            Completed

 

                2020      75406           Physical Therapy Eval - Low Comp

lexity Completed

 

                2020      93345           Endoscopy Wrist W/Release Transv

erse Carpal Ligament Completed







Medical Devices





                                        Description

 

                                        No Information Available







Encounters





                                        Description

 

                                        No Information Available







Assessments





                Date            Code            Description     Provider

 

                2020      M25.512         Pain in left shoulder Lars dukes, PT, DPT

 

                2020      M25.512         Pain in left shoulder Celenabhavya cool, PTA

 

                2020      M25.512         Pain in left shoulder Sergo Sigala MD

 

                2020      Z47.89          Encounter for other orthopedic a

ftercare Celena Alvarado, PTA

 

                2020      Z47.89          Encounter for other orthopedic a

ftercare Stacey Scee P.T.A.

 

                10/30/2020      Z47.89          Encounter for other orthopedic a

ftercare Celena Alvarado, PTA

 

                10/07/2020      Z47.89          Encounter for other orthopedic a

ftercare Lars Valentin, PT, 

DPT

 

                10/02/2020      Z47.89          Encounter for other orthopedic a

ftercare Celena Alvarado, PTA

 

                2020      Z47.89          Encounter for other orthopedic a

ftercare Lars Valentin, PT, 

DPT

 

                2020      Z47.89          Encounter for other orthopedic a

ftercare Stacey Scee P.T.A.

 

                2020      Z47.89          Encounter for other orthopedic a

ftercare Stacey Scee P.T.A.

 

                2020      Z47.89          Encounter for other orthopedic a

ftercare Stacey Scee P.T.A.

 

                2020      Z47.89          Encounter for other orthopedic a

ftercare Lars Valentin PT, 

DPT

 

                2020      Z47.89          Encounter for other orthopedic a

ftercare Stacey Scee P.T.A.

 

                2020      Z47.89          Encounter for other orthopedic a

ftercare Lars Valentin, PT, 

DPT

 

                2020      Z47.89          Encounter for other orthopedic a

ftercare Stacey Scee P.T.A.

 

                2020      Z47.89          Encounter for other orthopedic a

ftercare Stacey Scee P.T.A.

 

                2020      Z47.89          Encounter for other orthopedic a

ftercare Celena Alvarado, PTA

 

                2020      Z47.89          Encounter for other orthopedic a

ftercare Lars Valentin, PT, 

DPT

 

                    2020          M75.112             Incomplete rotator c

uff tear or rupture of left shoulder, not

specified as traumatic                  Giancarlo Real PA-C

 

                2020      M75.42          Impingement syndrome of left taqueria

wilfredo Giancarlo Real PA-C

 

                2020      M19.012         Primary osteoarthritis, left taqueria

wilfredo Giancarlo Real PA-C

 

                2020      Z47.89          Encounter for other orthopedic a

ftercemily Codi OLSON Jovon, YAKOV

 

                2020      Z47.89          Encounter for other orthopedic a

ftrober Valentin, PT, 

DPT

 

                2020      G56.02          Carpal tunnel syndrome, left upp

er limb Henry Trivedi MD







Plan of Treatment





No Information Available



Functional Status





                                        Description

 

                                        No Information Available







Mental Status





                                        Description

 

                                        No Information Available







Referrals





                Refer to Dr     Reason for Referral Status          Appt Date

 

                          Henry Trivedi MD        PT - 8 VISITS GOOD FOR CLAIRE S

[facility] FROM 10/19-20, AUTH 

#[id number], TRACKING #[id number]. SS  Created                   

 

                                        [address] [place] [address], [place] [address]

 

                                        [phone]

 

                                          

 

                          Henry Trivedi MD        [id number], [id number] 

DO NOT NEED AUTH, 2982, 

[id number], [id number] AUTH #[id number], SPOKE WITH MARIZA AND SHE WAS ABLE 
TO EXTEND THE AUTH FOR SURGERY FOR ME DATES ARE GOOD FROM 2020-2020.. 
SENT TO [facility] IN SURG SCHED..LD  Created                   

 

                                        [address] [place] [address], [address] [place] [address]

 

                                        [phone]

 

                                          

 

                          Henry Trivedi MD        PT - 13 VISITS FOR CLAIRE SHLDR

S FROM -10/9/20, 

#[id number], REF #[id number]. SS  Created                   

 

                                        [address] [place] [address], [address] [place] [address]

 

                                        [phone]

 

                                          

 

                          Henry Trivedi MD        PT EVAL [id number], R

[facility] SHOULDER, ALLOWED 1 VISIT 

THEN PT DEPT CALLS TO GET STEPHANIE GURROLA PT DEPT..LD  Created                   

 

                                        [address], [address]

 

                                        [place] [address]

 

                                        [phone]

## 2021-01-27 NOTE — CCD
Continuity of Care Document (CCD)

                             Created on: 2021



Adrián Shin

External Reference #: [id number]

: 1975

Sex: Male



Demographics





                          Address                   [address] 

[place]  [address]

 

                          Home Phone                [phone]

 

                          Preferred Language        Unknown

 

                          Marital Status            Unknown

 

                          Mormon Affiliation     Unknown

 

                          Race                      White

 

                          Ethnic Group              Not  or 





Author





                          Author                    Adrián VALENTIN DPT

 

                          Organization              Unknown

 

                          Address                   [address]  [address]



 

                          Phone                     [phone]







Care Team Providers





                    Care Team Member Name Role                Phone

 

                    Zachary Chakraborty MD      AUTM                [phone]

 

                    Jose Guerra MD AUTM                [phone]







Problems





                    Active Problems     Provider            Date

 

                    Pure hypercholesterolemia Henry Trivedi MD  Onset: 10/07/2

019







Social History





                Type            Date            Description     Comments

 

                Birth Sex                       Unknown          

 

                ETOH Use                        Occasionally consumes alcohol  

 

                Tobacco Use     Start: Unknown  Patient is a current smoker, smo

kes every day smokes 

a pipe 







Allergies, Adverse Reactions, Alerts





             Active Allergies Reaction     Severity     Comments     Date

 

             Effexor                                             10/07/2019

 

             Wellbutrin                                          10/07/2019

 

             Lexapro                                             10/07/2019







Medications





           Active Medications SIG        Qnty       Indications Ordering Provide

r Date

 

                          Tramadol HCL                     50mg Tablets         

          1 tab po every 

4-6 hours as needed pain after surgery(please do not fill until 6/3/2020). 

10tabs                                  Henry Trivedi MD  2020

 

                          Ventolin HFA                     108(90Base) mcg/Act A

erosol                    

                                                Henry Trivedi MD 2019

 

             Breo Ellipta                     200-25mcg/Inh Aerosol             

                                             

Unknown                                 

 

                Spiriva Respimat                     1.25mcg/Act Aerosol        

                                            

                          Unknown                   

 

                          Fluoxetine HCL                     40mg Capsules      

             1 by mouth 

every day                                       Unknown         

 

           Omeprazole                     40mg Capsules DR                      

                              Unknown    



 

                                        Albuterol Sulfate HFA                   

  108(90Base) mcg/Act Aerosol           

                                                    Unknown      

 

           Trazodone HCL                     50mg Tablets                       

                             Unknown    



 

             Clonidine                     0.3mg/24HR Patches Weekly            

                                              

Unknown                                 

 

             Hydroxyzine HCL                     10mg Tablets                   

                                       Unknown

                                        

 

                    Lovastatin                     20mg Tablets                 

  1 by mouth a day     

                                        Unknown             







Immunizations





                                        Description

 

                                        No Information Available







Vital Signs





                Date            Vital           Result          Comment

 

                2021  4:35pm Body Temperature 96.8 F         

 

                    Height              65 inches           5'5"

 

                    Weight              191.00 lb            

 

                    BMI (Body Mass Index) 31.8 kg/m2           

 

                2019  2:32pm Body Temperature 97.7 F         







Results





                                        Description

 

                                        No Information Available







Procedures





                Date            Code            Description     Status

 

                2021      60759           X-Ray Spine Lumbosacral Complete

 Inc Bending Views Min Of 6 

Completed

 

                2020      95703           Therapeutic Procedure, Each 15 M

inutes Completed

 

                2020      13103           Therapeutic Procedure, Each 15 M

inutes Completed

 

                2020      63469           Therapeutic Procedure, Each 15 M

inutes Completed

 

                2020      91992           Hot Or Cold Packs Completed

 

                2020      88987           Therapeutic Procedure, Each 15 M

inutes Completed

 

                2020      44130           Therapeutic Procedure, Each 15 M

inutes Completed

 

                2020      08604           Therapeutic Procedure, Each 15 M

inutes Completed

 

                2020      42241           Therapeutic Procedure, Each 15 M

inutes Completed

 

                2020      68070           Hot Or Cold Packs Completed

 

                2020      83729           Therapeutic Procedure, Each 15 M

inutes Completed

 

                2020      24188           X-Ray Shoulder Complete Complete

d

 

                2020      25109           Therapeutic Procedure, Each 15 M

inutes Completed

 

                2020      09942           Hot Or Cold Packs Completed

 

                2020      54952           Therapeutic Procedure, Each 15 M

inutes Completed

 

                10/30/2020      68763           Therapeutic Procedure, Each 15 M

inutes Completed

 

                10/30/2020      65025           Hot Or Cold Packs Completed

 

                    10/07/2020          04863               Re-Eval Of PT Establ

ished Plan Of Care 20Mins Face To Face 

PT/Fam                                  Completed

 

                10/07/2020      80630           Therapeutic Procedure, Each 15 M

inutes Completed

 

                10/02/2020      62254           Therapeutic Procedure, Each 15 M

inutes Completed

 

                2020      15099           Therapeutic Procedure, Each 15 M

inutes Completed

 

                2020      94184           Therapeutic Procedure, Each 15 M

inutes Completed

 

                2020      78624           Hot Or Cold Packs Completed

 

                2020      00847           Therapeutic Procedure, Each 15 M

inutes Completed

 

                2020      65258           Hot Or Cold Packs Completed

 

                2020      90243           Therapeutic Procedure, Each 15 M

inutes Completed

 

                2020      78985           Hot Or Cold Packs Completed

 

                2020      09772           Therapeutic Procedure, Each 15 M

inutes Completed

 

                2020      14623           Hot Or Cold Packs Completed

 

                2020      81423           Therapeutic Procedure, Each 15 M

inutes Completed

 

                    2020          94695               Re-Eval Of PT Establ

ished Plan Of Care 20Mins Face To Face 

PT/Fam                                  Completed

 

                2020      35357           Therapeutic Procedure, Each 15 M

inutes Completed

 

                2020      62495           Therapeutic Procedure, Each 15 M

inutes Completed

 

                2020      87395           Hot Or Cold Packs Completed

 

                2020      70261           Therapeutic Procedure, Each 15 M

inutes Completed

 

                2020      62953           Hot Or Cold Packs Completed

 

                2020      74574           Therapeutic Procedure, Each 15 M

inutes Completed

 

                2020      22969           Hot Or Cold Packs Completed

 

                2020      58418           Physical Therapy Eval - Low Comp

lexity Completed







Medical Devices





                                        Description

 

                                        No Information Available







Encounters





                                        Description

 

                                        No Information Available







Assessments





                Date            Code            Description     Provider

 

                2021      M47.896         Other spondylosis, lumbar region

 Henry Trivedi MD

 

                2020      Z47.89          Encounter for other orthopedic a

ftercare Lars Valentin, PT, 

DPT

 

                2020      Z47.89          Encounter for other orthopedic a

ftercare Danamarie Ortolano, 

PTA

 

                2020      Z47.89          Encounter for other orthopedic a

ftercare Celena Alvarado, PTA

 

                2020      Z47.89          Encounter for other orthopedic a

ftercare Danamarie Ortolano, 

PTA

 

                2020      M25.512         Pain in left shoulder Celena Steel

e, PTA

 

                2020      M25.512         Pain in left shoulder Celena Steel

e, PTA

 

                2020      M25.512         Pain in left shoulder Lars dukes, PT, DPT

 

                2020      M25.512         Pain in left shoulder Celena Steel

e, PTA

 

                2020      M25.512         Pain in left shoulder Sergo Sigala MD

 

                2020      Z47.89          Encounter for other orthopedic a

ftercare Celena Alvarado, PTA

 

                2020      Z47.89          Encounter for other orthopedic a

ftercare Stacey Scee P.T.A.

 

                10/30/2020      Z47.89          Encounter for other orthopedic a

ftercare Celena Alvarado, PTA

 

                10/07/2020      Z47.89          Encounter for other orthopedic a

ftercare Lars HEATON Deanna, PT, 

DPT

 

                10/02/2020      Z47.89          Encounter for other orthopedic a

ftercare Celena Alvarado, PTA

 

                2020      Z47.89          Encounter for other orthopedic a

ftercare Lars HEATON Deanna, PT, 

DPT

 

                2020      Z47.89          Encounter for other orthopedic a

ftercare Stacey Scee P.T.A.

 

                2020      Z47.89          Encounter for other orthopedic a

ftercare Stacey Scee P.T.A.

 

                2020      Z47.89          Encounter for other orthopedic a

ftercare Stacey Scee P.T.A.

 

                2020      Z47.89          Encounter for other orthopedic a

ftercare Lars Valentin, PT, 

DPT

 

                2020      Z47.89          Encounter for other orthopedic a

ftercare Stacey Scee P.T.A.

 

                2020      Z47.89          Encounter for other orthopedic a

ftercare Lars MIGUELKirsten Valentin, PT, 

DPT

 

                2020      Z47.89          Encounter for other orthopedic a

ftercare Stacey Scee P.T.A.

 

                2020      Z47.89          Encounter for other orthopedic a

ftercare Stacey Scee P.T.A.

 

                2020      Z47.89          Encounter for other orthopedic a

ftercare Celena Alvarado, PTA

 

                2020      Z47.89          Encounter for other orthopedic a

ftercare Lars MIGUELKirsten Valentin, PT, 

DPT







Plan of Treatment





No Information Available



Functional Status





                                        Description

 

                                        No Information Available







Mental Status





                                        Description

 

                                        No Information Available







Referrals





                Refer to Dr     Reason for Referral Status          Appt Date

 

                Henry Trivedi MD PT ALLOWED EVAL THEN NEEDS AUTH TO PT DEPT. N

T  Created         



 

                                        [facility], [facility] [address]

 

                                        [place] [address]

 

                                        [phone]

 

                                          

 

                Henry Trivedi MD PT ALLOWED EVAL THEN NEEDS AUTH TO PT DEPT. N

T  Created         



 

                                        [address] [place] [address], [address] [place], [place] [address] [phone]

 

                                          

 

                          Henry Trivedi MD        PT - 8 VISITS [facility] S

HLDRS FROM 10/19-20, AUTH 

#[id number], TRACKING #[id number]. SS  Created                   

 

                                        [address] [place] [address], [address] [address] [phone]

## 2021-01-27 NOTE — CCD
Continuity of Care Document (CCD)

                             Created on: 2020



Adrián Shin

External Reference #: [id number]

: 1975

Sex: Male



Demographics





                          Address                   [address]

[place]  [address]

 

                          Home Phone                [phone]

 

                          Preferred Language        Unknown

 

                          Marital Status            Unknown

 

                          Shinto Affiliation     Unknown

 

                          Race                      White

 

                          Ethnic Group              Not  or 





Author





                          Author                    Adrián ALVARADO [facility]

 

                          Organization              Unknown

 

                          Address                   [address] [place] [address]  [address]



 

                          Phone                     [phone]







Care Team Providers





                    Care Team Member Name Role                Phone

 

                    Wendi Martinez MD AUTM                [phone]

 

                    Zachary Chakraborty MD      AUTM                [phone]







Problems





                    Active Problems     Provider            Date

 

                    Pure hypercholesterolemia Henry Trivedi MD  Onset: 10/07/2

019







Social History





                Type            Date            Description     Comments

 

                Birth Sex                       Unknown          

 

                ETOH Use                        Occasionally consumes alcohol  

 

                Tobacco Use     Start: Unknown  Patient is a current smoker, smo

kes every day smokes 

a pipe 







Allergies, Adverse Reactions, Alerts





             Active Allergies Reaction     Severity     Comments     Date

 

             Effexor                                             10/07/2019

 

             Wellbutrin                                          10/07/2019

 

             Lexapro                                             10/07/2019







Medications





           Active Medications SIG        Qnty       Indications Ordering Provide

r Date

 

                          Tramadol HCL                     50mg Tablets         

          1 tab po every 

4-6 hours as needed pain after surgery(please do not fill until 6/3/2020). 

10tabs                                  Henry Trivedi MD  2020

 

                          Ventolin HFA                     108(90Base) mcg/Act A

erosol                    

                                                Henry Trivedi MD 2019

 

             Breo Ellipta                     200-25mcg/Inh Aerosol             

                                             

Unknown                                 

 

                Spiriva Respimat                     1.25mcg/Act Aerosol        

                                            

                          Unknown                   

 

                          Fluoxetine HCL                     40mg Capsules      

             1 by mouth 

every day                                       Unknown         

 

           Omeprazole                     40mg Capsules DR                      

                              Unknown    



 

                                        Albuterol Sulfate HFA                   

  108(90Base) mcg/Act Aerosol           

                                                    Unknown      

 

           Trazodone HCL                     50mg Tablets                       

                             Unknown    



 

             Clonidine                     0.3mg/24HR Patches Weekly            

                                              

Unknown                                 

 

             Hydroxyzine HCL                     10mg Tablets                   

                                       Unknown

                                        

 

                    Lovastatin                     20mg Tablets                 

  1 by mouth a day     

                                        Unknown             







Immunizations





                                        Description

 

                                        No Information Available







Vital Signs





                Date            Vital           Result          Comment

 

                2019  2:32pm Body Temperature 97.7 F         

 

                2019 10:35am Body Temperature 97.7 F         

 

                    Height              65.5 inches         5'5.50"

 

                    Weight              188.00 lb            

 

                    BMI (Body Mass Index) 30.8 kg/m2           







Results





        Test    Acquired Date Facility Test    Result  H/L     Range   Note

 

                    Coronavirus 2019 Nasopharygeal 2020          [address] [address]

           (315)-   - Coronavirus 2019 Nasopharygeal Testing was perf <SEE NOTE>

                        1







                          1                         Testing was performed using 

the hollie(R) SARS-CoV-2 test.

This test was developed and its performance characteristics

determined by [employer]. This test has not been

FDA cleared or approved. This test has been authorized by

FDA under an Emergency Use Authorization (EUA). This test

is only authorized for the duration of time the declaration

that circumstances exist justifying the authorization of

the emergency use of in vitro diagnostic tests for

detection of SARS-CoV-2 virus and/or diagnosis of COVID-19

infection under section 564(b)(1) of the Act, 21 U.S.C.

                                        360bbb-3(b)(1), unless the authorization

 is terminated or

revoked sooner. When diagnostic testing is negative, the

possibility of a false negative result should be considered

in the context of a patient's recent exposures and the

presence of clinical signs and symptoms consistent with

COVID-19. An individual without symptoms of COVID-19 and

who is not shedding SARS-CoV-2 virus would expect to have a

negative (not detected) result in this assay.

Performed at:  [address]  [id number]

: Araceli B Reyes MD, Phone:  [phone]



Not Detected











Procedures





                Date            Code            Description     Status

 

                2020      60429           Therapeutic Procedure, Each 15 M

inutes Completed

 

                2020      94925           Therapeutic Procedure, Each 15 M

inutes Completed

 

                2020      12987           Hot Or Cold Packs Completed

 

                2020      27404           X-Ray Shoulder Complete Complete

d

 

                2020      06211           Therapeutic Procedure, Each 15 M

inutes Completed

 

                2020      42618           Hot Or Cold Packs Completed

 

                2020      18913           Therapeutic Procedure, Each 15 M

inutes Completed

 

                10/30/2020      04678           Therapeutic Procedure, Each 15 M

inutes Completed

 

                10/30/2020      68143           Hot Or Cold Packs Completed

 

                10/07/2020      78979           Therapeutic Procedure, Each 15 M

inutes Completed

 

                    10/07/2020          85024               Re-Eval Of PT Establ

ished Plan Of Care 20Mins Face To Face 

PT/Fam                                  Completed

 

                10/02/2020      10224           Therapeutic Procedure, Each 15 M

inutes Completed

 

                2020      71878           Therapeutic Procedure, Each 15 M

inutes Completed

 

                2020      80747           Therapeutic Procedure, Each 15 M

inutes Completed

 

                2020      62875           Hot Or Cold Packs Completed

 

                2020      44388           Therapeutic Procedure, Each 15 M

inutes Completed

 

                2020      05115           Hot Or Cold Packs Completed

 

                2020      32475           Hot Or Cold Packs Completed

 

                2020      30806           Therapeutic Procedure, Each 15 M

inutes Completed

 

                2020      88745           Therapeutic Procedure, Each 15 M

inutes Completed

 

                2020      03156           Therapeutic Procedure, Each 15 M

inutes Completed

 

                2020      63630           Hot Or Cold Packs Completed

 

                    2020          83911               Re-Eval Of PT Establ

ished Plan Of Care 20Mins Face To Face 

PT/Fam                                  Completed

 

                2020      74006           Therapeutic Procedure, Each 15 M

inutes Completed

 

                2020      12415           Therapeutic Procedure, Each 15 M

inutes Completed

 

                2020      21862           Hot Or Cold Packs Completed

 

                2020      02875           Hot Or Cold Packs Completed

 

                2020      44307           Therapeutic Procedure, Each 15 M

inutes Completed

 

                2020      98706           Therapeutic Procedure, Each 15 M

inutes Completed

 

                2020      35542           Hot Or Cold Packs Completed

 

                2020      17809           Physical Therapy Eval - Low Comp

lexity Completed

 

                2020      63862           Arthroscopy Shoulder Decompress 

Subacromial Part Acromioplasty 

Completed

 

                    2020          00954               Arthroscopy Shoulder

 Removal Loose/Foreign Body Dist 

Claviculecto                            Completed

 

                2020      24476           Physical Therapy Eval - Low Comp

lexity Completed

 

                2020      67903           Endoscopy Wrist W/Release Transv

erse Carpal Ligament Completed







Medical Devices





                                        Description

 

                                        No Information Available







Encounters





                                        Description

 

                                        No Information Available







Assessments





                Date            Code            Description     Provider

 

                2020      M25.512         Pain in left shoulder Lars dukes, PT, DPT

 

                2020      M25.512         Pain in left shoulder Celenabhavya Salinas

e, PTA

 

                2020      M25.512         Pain in left shoulder Sergo Sigala MD

 

                2020      Z47.89          Encounter for other orthopedic a

ftercare Celena Alvarado, PTA

 

                2020      Z47.89          Encounter for other orthopedic a

ftercare Stacey Scee P.T.A.

 

                10/30/2020      Z47.89          Encounter for other orthopedic a

ftercare Celena Alvarado, PTA

 

                10/07/2020      Z47.89          Encounter for other orthopedic a

ftercare Lars Huerta, PT, 

DPT

 

                10/02/2020      Z47.89          Encounter for other orthopedic a

ftercare Celena Alvarado, PTA

 

                2020      Z47.89          Encounter for other orthopedic a

ftercare Lars Huerta, PT, 

DPT

 

                2020      Z47.89          Encounter for other orthopedic a

ftercare Stacey Scee P.T.A.

 

                2020      Z47.89          Encounter for other orthopedic a

ftercare Stacey Scee P.T.A.

 

                2020      Z47.89          Encounter for other orthopedic a

ftercare Stacey Scee P.T.A.

 

                2020      Z47.89          Encounter for other orthopedic a

ftercare Lars Huerta PT, 

DPT

 

                2020      Z47.89          Encounter for other orthopedic a

ftercare Stacey Scee P.T.A.

 

                2020      Z47.89          Encounter for other orthopedic a

ftercare Lars Huerta, PT, 

DPT

 

                2020      Z47.89          Encounter for other orthopedic a

ftercare Stacey Scee P.T.A.

 

                2020      Z47.89          Encounter for other orthopedic a

ftercare Stacey Scee P.T.A.

 

                2020      Z47.89          Encounter for other orthopedic a

ftercare Celena Alvarado, PTA

 

                2020      Z47.89          Encounter for other orthopedic a

ftercare Lars Jamesan, PT, 

DPT

 

                    2020          M75.112             Incomplete rotator c

uff tear or rupture of left shoulder, not

specified as traumatic                  Giancarlo Real PA-C

 

                2020      M75.42          Impingement syndrome of left taqueria

wilfredo Giancarlo Real PA-C

 

                2020      M19.012         Primary osteoarthritis, left atqueria

wilfredo Giancarlo MIGUELKirsten Real PA-C

 

                2020      Z47.89          Encounter for other orthopedic a

kayla Codi OLSON YAKOV Sigala

 

                2020      Z47.89          Encounter for other orthopedic a

kayla Huerta, PT, 

DPT

 

                2020      G56.02          Carpal tunnel syndrome, left upp

er limb Henry Trivedi MD







Plan of Treatment





No Information Available



Functional Status





                                        Description

 

                                        No Information Available







Mental Status





                                        Description

 

                                        No Information Available







Referrals





                Refer to Dr     Reason for Referral Status          Appt Date

 

                          Henry Trivedi MD        PT - 8 VISITS GOOD FOR CLAIRE S

HLDRS FROM 10/19-20, AUTH 

#[id number], TRACKING #[id number]. SS  Created                   

 

                                        [address] [place] [address], [place] [address]

 

                                        [phone]

 

                                          

 

                          Henry Trivedi MD        [id number], [id number] 

DO NOT NEED AUTH, 2982, 

[id number], [id number] AUTH #[id number], SPOKE WITH MARIZA AND SHE WAS ABLE 
TO EXTEND THE AUTH FOR SURGERY FOR ME DATES ARE GOOD FROM 2020-2020.. 
SENT TO [facility] IN SURG SCHED..LD  Created                   

 

                                        [address] [place] [address], [address] [place] [address]

 

                                        [phone]

 

                                          

 

                          Henry Trivedi MD        PT - 13 VISITS FOR CLAIRE SHLDR

S FROM -10/9/20, 

#[id number], REF #[id number]. SS  Created                   

 

                                        [address] [place] [address], [address] [place] [address]

 

                                        [phone]

 

                                          

 

                          Henry Trivedi MD        PT EVAL [id number], R

IGTH SHOULDER, ALLOWED 1 VISIT 

THEN PT DEPT CALLS TO GET [facility] PT DEPT..LD  Created                   

 

                                        [address] [facility], [address]

 

                                        [place] [address]

 

                                        [phone]

## 2021-01-27 NOTE — CCD
Summarization Of Episode

                             Created on: 2021



AMAN SHIN SR

External Reference #: [id number]

: 1975

Sex: Male



Demographics





                          Address                   [address] [place] [address] [address]

[place]  [address]

 

                          Home Phone                [phone]

 

                          Preferred Language        English

 

                          Marital Status            

 

                          [place] Affiliation     [place]

 

                          Race                      White

 

                          Ethnic Group              Not  or 





Author





                          Author                    [facility]

 

                          Organization              [facility]

 

                          Address                   Unknown

 

                          Phone                     Unavailable







Support





                Name            Relationship    Address         Phone

 

                UE              Next Of Kin     Unknown         Unavailable

 

                    UNEMPLOYED          Next Of Kin         [address] 

[place]  [address]                  [phone]

 

                CONTACT,  U     Next Of Kin     Unknown         [phone]

 

                    SELF EMPLOYED       Next Of Kin         [address] 

[place]  [address]                  [phone]

 

                    GIULIA SHIN         Next Of Kin         [address] 

[place]  [address]                  [phone]

 

                    [place]            Next Of Kin         [address] [place]  [address]                       [phone]

 

                UN              Next Of Kin     Unknown         Unavailable

 

                    ANA SHIN         Next Of Kin         [address] [place] [address] [address]

[place]  [address]                       [phone]

 

                    Ana Shin         [address]

[place]  [address]                       [phone] 







Care Team Providers





                    Care Team Member Name Role                Phone

 

                    JULES Trivedi MD   Unavailable         Unavailable

 

                    JULES Trivedi MD   Unavailable         Unavailable

 

                    JULES Trivedi MD   Unavailable         Unavailable

 

                    JULES Trivedi MD   Unavailable         Unavailable

 

                    JULES Trivedi MD   Unavailable         Unavailable

 

                    JULES Trivedi MD   Unavailable         Unavailable

 

                    JULES Trivedi MD   Unavailable         Unavailable

 

                    JULES Trivedi MD   Unavailable         Unavailable

 

                    JULES Trivedi MD   Unavailable         Unavailable

 

                    JULES Trivedi MD   Unavailable         Unavailable

 

                    JULES Trivedi MD   Unavailable         Unavailable

 

                    JULES Trivedi MD   Unavailable         Unavailable

 

                    JULES Trivedi MD   Unavailable         Unavailable

 

                    JULES Trivedi MD   Unavailable         Unavailable

 

                    JULES Trivedi MD   Unavailable         Unavailable

 

                    JULES Trivedi MD   Unavailable         Unavailable

 

                    JULES Trivedi MD   Unavailable         Unavailable

 

                    JULES Trivedi MD   Unavailable         Unavailable

 

                    JULES Trivedi MD   Unavailable         Unavailable

 

                    JULES Trivedi MD   Unavailable         Unavailable

 

                    JULES Trivedi MD   Unavailable         Unavailable

 

                    JULES Trivedi MD   Unavailable         Unavailable

 

                    JULES Trivedi MD   Unavailable         Unavailable

 

                    JULES Trivedi MD   Unavailable         Unavailable

 

                    JULES Trivedi MD   Unavailable         Unavailable

 

                    JULES Trivedi MD   Unavailable         Unavailable

 

                    JULES Trivedi MD   Unavailable         Unavailable

 

                    JULES Trivedi MD   Unavailable         Unavailable

 

                    Trivedi, JULES Figureoa MD   Unavailable         Unavailable

 

                    Trivedi, JULES Figueroa MD   Unavailable         Unavailable

 

                    Trivedi, JULES Figueroa MD   Unavailable         Unavailable

 

                    Trivedi, JULES Figueroa MD   Unavailable         Unavailable

 

                    Trivedi, JULES Figueroa MD   Unavailable         Unavailable

 

                    Trivedi, JULES Figueroa MD   Unavailable         Unavailable

 

                    Trivedi, JULES Figueroa MD   Unavailable         Unavailable

 

                    Trivedi, JULES Figueroa MD   Unavailable         Unavailable

 

                    Trivedi, JULES Figueroa MD   Unavailable         Unavailable

 

                    Trivedi, JULES Figueroa MD   Unavailable         Unavailable

 

                    Trivedi, JULES Figueroa MD   Unavailable         Unavailable

 

                    Trivedi, JULES Figueroa MD   Unavailable         Unavailable

 

                    Trivedi, JULES Figueroa MD   Unavailable         Unavailable

 

                    Trivedi, JULES Figueroa MD   Unavailable         Unavailable

 

                    Trivedi, JULES Figueroa MD   Unavailable         Unavailable

 

                    Trivedi, JULES Figueroa MD   Unavailable         Unavailable

 

                    Trivedi, JULES Figueroa MD   Unavailable         Unavailable

 

                    Trivedi, JULES Figueroa MD   Unavailable         Unavailable

 

                    Trivedi, JULES Figueroa MD   Unavailable         Unavailable

 

                    Mollison, RUTHIE Garcia MD Unavailable         Unavailable

 

                    Mollison, RUTHIE Garcia MD Unavailable         Unavailable

 

                    Mollison, RUTHIE Garcia MD Unavailable         Unavailable

 

                    Mollison, RUTHIE Garcia MD Unavailable         Unavailable

 

                    Mollison, RUTHIE Garcia MD Unavailable         Unavailable

 

                    Mollison, RUTHIE Garcia MD Unavailable         Unavailable

 

                    Mollison, RUTHIE Garcia MD Unavailable         Unavailable

 

                    Mollison, RUTHIE Garcia MD Unavailable         Unavailable

 

                    Mollison, RUTHIE Garcia MD Unavailable         Unavailable

 

                    Mollison, RUTHIE Garcia MD Unavailable         Unavailable

 

                    Mollison, RUTHIE Garcia MD Unavailable         Unavailable

 

                    Mollison, RUTHIE Garcia MD Unavailable         Unavailable

 

                    MollisonRUTHIE MD Unavailable         Unavailable

 

                    MollisonRUTHIE MD Unavailable         Unavailable

 

                    MollisonRUTHIE MD Unavailable         Unavailable

 

                    Mollison, RUTHIE Garcia MD Unavailable         Unavailable

 

                    Mollison, RUTHIE Garcia MD Unavailable         Unavailable

 

                    MollisonRUTHIE MD Unavailable         Unavailable

 

                    MollisonRUTHIE MD Unavailable         Unavailable

 

                    MollisonRUTHIE MD Unavailable         Unavailable

 

                    MollisonRUTHIE MD Unavailable         Unavailable

 

                    MollisonRUTHIE MD Unavailable         Unavailable

 

                    Oscar Camacho MD Unavailable         Unavailable

 

                    Oscar Camacho MD Unavailable         Unavailable

 

                    Oscar Camacho MD Unavailable         Unavailable

 

                    Oscar Camacho MD Unavailable         Unavailable

 

                    Oscar Camacho MD Unavailable         Unavailable

 

                    Oscar Camacho MD Unavailable         Unavailable

 

                    Oscar Camacho MD Unavailable         Unavailable

 

                    Oscar Camacho MD Unavailable         Unavailable

 

                    Oscar Camacho MD Unavailable         Unavailable

 

                    Oscar Camacho MD Unavailable         Unavailable

 

                    Oscar Camacho MD Unavailable         Unavailable

 

                    Oscar Camacho MD Unavailable         Unavailable

 

                    Oscar Camacho MD Unavailable         Unavailable

 

                    Oscar Camacho MD Unavailable         Unavailable

 

                    Oscar Camacho MD Unavailable         Unavailable

 

                    Oscar Camacho MD Unavailable         Unavailable

 

                    Oscar Camacho MD Unavailable         Unavailable

 

                    Oscar Camacho MD Unavailable         Unavailable

 

                    Oscar Camacho MD Unavailable         Unavailable

 

                    Oscar Camacho MD Unavailable         Unavailable

 

                    Oscar Camacho MD Unavailable         Unavailable

 

                    Oscar Camacho MD Unavailable         Unavailable

 

                    Oscar Camacho MD Unavailable         Unavailable

 

                    Camacho, Oscar Williams MD Unavailable         Unavailable

 

                    Camacho, Oscar Williams MD Unavailable         Unavailable

 

                    Camacho, Oscar Williams MD Unavailable         Unavailable

 

                    Camacho, Oscar Williams MD Unavailable         Unavailable

 

                    Camacho, Oscar Williams MD Unavailable         Unavailable

 

                    Camacho, Oscar Williams MD Unavailable         Unavailable

 

                    Camacho, Oscar Williams MD Unavailable         Unavailable

 

                    Camacho, Oscar Williams MD Unavailable         Unavailable

 

                    Camacho, Oscar Williams MD Unavailable         Unavailable

 

                    Camacho, Oscar Williams MD Unavailable         Unavailable

 

                    Camacho, Oscar Williams MD Unavailable         Unavailable

 

                    Camacho, Oscar Williams MD Unavailable         Unavailable

 

                    Camacho, Oscar Williams MD Unavailable         Unavailable

 

                    Camacho, Oscar Williams MD Unavailable         Unavailable

 

                    Camacho, Oscar Williams MD Unavailable         Unavailable

 

                    Camacho, Oscar Williams MD Unavailable         Unavailable

 

                    Camacho, Oscar Williams MD Unavailable         Unavailable

 

                    Camacho, Oscar Williams MD Unavailable         Unavailable

 

                    Camacho, Oscar Williams MD Unavailable         Unavailable

 

                    Camacho, Oscar Williams MD Unavailable         Unavailable

 

                    Camacho, Oscar Williams MD Unavailable         Unavailable

 

                    Camacho, Oscar Williams MD Unavailable         Unavailable

 

                    Camacho, Oscar Williams MD Unavailable         Unavailable

 

                    Camacho, Oscar Williams MD Unavailable         Unavailable

 

                    Camacho, Oscar Williams MD Unavailable         Unavailable

 

                    Camacho, Oscar Williams MD Unavailable         Unavailable

 

                    Camacho, Oscar Williams MD Unavailable         Unavailable

 

                    Camacho, Oscar Williams MD Unavailable         Unavailable



                                  



Re-disclosure Warning

          The records that you are about to access may contain information from 
federally-assisted alcohol or drug abuse programs. If such information is 
present, then the following federally mandated warning applies: This information
has been disclosed to you from records protected by federal confidentiality 
rules (42 CFR part 2). The federal rules prohibit you from making any further 
disclosure of this information unless further disclosure is expressly permitted 
by the written consent of the person to whom it pertains or as otherwise 
permitted by 42 CFR part 2. A general authorization for the release of medical 
or other information is NOT sufficient for this purpose. The Federal rules 
restrict any use of the information to criminally investigate or prosecute any 
alcohol or drug abuse patient.The records that you are about to access may 
contain highly sensitive health information, the redisclosure of which is 
protected by Article 27-F of the [facility] Public Health law. If you 
continue you may have access to information: Regarding HIV / AIDS; Provided by 
facilities licensed or operated by the [facility] Office of Mental Health; 
or Provided by the [facility] Office for People With Developmental 
Disabilities. If such information is present, then the following [place] 
mandated warning applies: This information has been disclosed to you from 
confidential records which are protected by state law. State law prohibits you 
from making any further disclosure of this information without the specific 
written consent of the person to whom it pertains, or as otherwise permitted by 
law. Any unauthorized further disclosure in violation of state law may result in
a fine or retirement sentence or both. A general authorization for the release of 
medical or other information is NOT sufficient authorization for further disc
losure.                                                                         
    



Allergies and Adverse Reactions

          



           Type       Description Substance  Reaction   Status     Data Source(s

)

 

           Drug allergy Lexapro    Escitalopram Fatigue    Active     eCW1 ([facility])

 

           Feathers   Feathers   Feathers   Unknown    Active     eCW1 ([facility])

 

           Effexor    Effexor    Effexor    Excessive Emotions  Active     eCW1 

([facility])

 

           Wellbutrin Wellbutrin Wellbutrin Aggressive  Active     eCW1 ([facility])

 

           Feathers   Feathers   Feathers   Unknown    Active     eCW1 ([facility])

 

           Effexor    Effexor    Effexor    Excessive Emotions  Active     eCW1 

([facility])

 

           Wellbutrin Wellbutrin Wellbutrin Aggressive  Active     eCW1 ([facility])

 

           Feathers   Feathers   Feathers   Unknown    Active     eCW1 ([facility])

 

           Effexor    Effexor    Effexor    Excessive Emotions  Active     eCW1 

([facility])

 

           Wellbutrin Wellbutrin Wellbutrin Aggressive  Active     eCW1 ([facility])

 

           Feathers   Feathers   Feathers   Unknown    Active     eCW1 ([facility])

 

           Effexor    Effexor    Effexor    Excessive Emotions  Active     eCW1 

([facility])

 

           Wellbutrin Wellbutrin Wellbutrin Aggressive  Active     eCW1 ([facility])



                                                                                
                                                                                
                                              



Encounters

          



           Encounter  Providers  Location   Date       Indications Data Source(s

)

 

                Unknown                         [address] [facility] [place] [address] 2021 12:00:00 AM 

EST                                                 eCW1 ([facility])

 

                Unknown                         [address] [facility] [place]

[place] [address] 2021 12:00:00 AM 

EST                                                 eCW1 ([facility])

 

                Outpatient                      [address] [facility] [place]

[place] [address] 2021 12:00:00 AM

EST                                                 eCW1 ([facility])

 

                Unknown                         [address] [facility] [place]

[place] [address] 2021 12:00:00 AM 

EST                                                 eCW1 ([facility])

 

                    ([facility]) [facility] Scheduled Visit                     

[address] [place] 

[address]          2020 12:00:00 AM EST                     eCW1 ([facility])

 

                Outpatient      Attender: Jose Davies/Cira/Bandar/Re

indl 2020 

08:40:00 AM EST                                     [facility]

 

                    ([facility] Visit                     

[address] [place] 

[address]          12/10/2020 12:00:00 AM EST                     eCW1 ([facility])

 

                Unknown                         [address] [facility] [place]

[place] [address] 2020 12:00:00 AM 

EST                                                 eCW1 ([facility])

 

                Outpatient      Attender: Jose Davies/Cira/Bandar/Re

indl 2020 

02:10:00 PM EST                                     [facility]

 

                Outpatient      Attender: Williams Davies/Cira/Bandar/R

eindl 10/26/2020 

03:30:00 PM EDT                                     [facility]

 

                    ([facility] Visit                     

[address] [place] 

[address]          10/14/2020 12:00:00 AM EDT                     eCW1 ([facility])

 

                [facility]                    [address] [facility] [place]

[place] [address] 2020 12:00:00 

AM EDT                                              eCW1 ([facility])

 

                Office Visit    Attender: Jose Davies/Cira/Bandar/Re

indl 2020 

10:50:00 AM EDT                                     [facility]

 

                Outpatient      Attender: Jose Davies/Cira/Bandar/Re

indl 2020 

01:40:00 PM EDT                                     [facility]

 

                [facility]                      [address] [facility] [place]

[place] [address] 2020 12:00:00 AM

EDT                                                 [facility]CW1 [facility]

[facility])

 

                [facility] [address] [place] [address] 2020 

12:00:00 AM EDT                                     [facility]CW[facility]

[facility])

 

                [address]

[place] [address] 2020 12:00:00 AM

EDT                                                 [facility]

[facility]

 

                [facility] [address] [place] [address] 2020 

12:00:00 AM EDT                                     eCW1 ([facility]

[facility])

 

                Office Visit    Attender: Jose Davies/Cira/Bandar/Re

indl 2020 

09:40:00 AM EDT                                     [facility]

 

           Outpatient Referrer: Henry Trivedi MD            05/10/2020 09:02:00 AM

 EDT            [facility]

 

                [facility] [address] [place] [address] 2020 

12:00:00 AM EDT                                     eCW1 ([facility]

[facility])

 

                [facility]                    [address] [place] [address] 2020 12:00:00 

AM EDT                                              eCW1 ([facility]

[facility])

 

                Outpatient      Attender: Jose Davies/Cira/Bandar/Re

indl 2020 

01:20:00 PM EDT                                     [facility]

 

                [facility] [address] [place] [address] 2020 

12:00:00 AM EDT                                     eCW1 ([facility]

[facility])

 

                [address] [address] 2020 12:00:00 AM

EDT                                                 eCW1 ([facility]

[facility])

 

                Outpatient      Attender: Williams Davies/Cira/Bandar/R

eindl 2020 

03:30:00 PM EDT                                     [facility]

 

                [facility]                    [address] [facility] [address] 2020 12:00:00 

AM EDT                                              eCW1 ([facility]

[facility])

 

                [facility] [address] [place] [address] 2020 

12:00:00 AM EDT                                     eCW1 [facility]

[facility] [place])

 

                [address] [address] 2020 12:00:00 AM

EDT                                                 eCW1 ([facility]

[facility] [place])

 

                [facility] [address] [place] [address] 2020 

12:00:00 AM EDT                                     eCW1 ([facility]

[facility] [place])

 

                [facility] [address] [place] [address] 2020 

12:00:00 AM EDT                                     eCW1 ([facility]

[facility])

 

                [facility] [address] [place] [address] 2020 

12:00:00 AM EDT                                     eCW[facility] ([facility]

[facility] [place])

 

                [facility] [address] [place] [address] 2020 

12:00:00 AM EDT                                     eCW1 ([facility]

[facility])

 

                Outpatient      Attender: Jose Davies/Cira/Bandar/Re

indl 2020 

02:40:00 PM EST                                     [facility]

 

           Outpatient Referrer: Henry Trivedi MD            2020 12:05:00 PM

 EST            [facility]

 

                [facility] [address] [place] [address] 2020 

12:00:00 AM EST                                     eCW1 [facility]

[facility])

 

                Outpatient      Attender: Jose Davies/Cira/Bandar/Re

indl 2020 

02:10:00 PM EST                                     [facility]

 

                [address]

[place] [address] 2020 12:00:00 AM

EST                                                 eCW1 ([facility])

 

           Outpatient Referrer: Henry Trivedi MD            2020 11:31:00 AM

 EST            [facility]

 

           Outpatient Referrer: Henry Trivedi MD            2020 10:38:00 AM

 EST            [facility]

 

                [facility]                    [address] [facility] [place]

[place] [address] 2020 12:00:00 

AM EST                                              eCW1 ([facility]

[facility])

 

                [facility]                    [address] [facility]

[place] [address] 2020 12:00:00 

AM EST                                              eCW1 ([facility]

[facility])

 

                [facility]                 [address] [place] [address] 2020 

12:00:00 AM EST                                     eCW1 ([facility]

 

           Outpatient Referrer: Henry Trivedi MD            2020 01:42:00 PM

 EST            [facility]

 

           Outpatient Referrer: Henry Trivedi MD            2020 11:42:00 AM

 EST            [facility]

 

           Outpatient                       2020 11:19:00 AM EST          

  [facility]

 

                [facility]                      [address] [place] [address], [place]

[place] [address] 2020 12:00:00 AM

EST                                                 eCW1 ([facility])

 

                [facility]                      [address] [facility] [address] 2019 12:00:00 AM

EST                                                 eCW1 ([facility])

 

                [facility]                      [address] [facility]

[place] [address] 2019 12:00:00 AM

EST                                                 eCW1 ([facility])

 

                [facility]                      [address] [facility]

[place] [address] 2019 12:00:00 AM

EST                                                 eCW1 ([facility])

 

                [facility]                      [address] [facility] [address] 2019 12:00:00 AM

EST                                                 eCW1 ([facility])

 

                [facility]                      [address] [facility]

[place] [address] 2019 12:00:00 AM

EST                                                 eCW1 ([facility])

 

                [facility]                      [address] [address] 2019 12:00:00 AM

EST                                                 eCW1 ([facility])

 

                [facility]                      [address] [facility]

[place] [address] 2019 12:00:00 AM

EST                                                 eCW1 ([facility])

 

                [facility]                      [address] [facility]

[place] [address] 2019 12:00:00 AM

EST                                                 eCW1 ([facility])

 

                [facility]                      [address] [facility]

[place] [address] 2019 12:00:00 AM

EST                                                 eCW1 ([facility])

 

                [facility]                      [address] [place] [address], [place]

[place] [address] 2019 12:00:00 AM

EST                                                 eCW1 ([facility])

 

                [facility]                      [address] [facility]

[place] [address] 2019 12:00:00 AM

EST                                                 eCW1 ([facility])

 

                [facility]                    [address]

[place] [address] 2019 12:00:00 

AM EST                                              eCW1 ([facility])

 

                [facility]                      [address] [facility]

[place] [address] 2019 12:00:00 AM

EST                                                 eCW1 ([facility])

 

                [facility]                      [address] [facility]

[place] [address] 12/10/2019 12:00:00 AM

EST                                                 eCW1 ([facility])

 

                [facility]                      [address] [facility]

[place] [address] 2019 12:00:00 AM

EST                                                 eCW1 ([facility])

 

                [place] [address] [facility]

[place] [address] 2019 12:00:00 AM

EST                                                 eCW1 ([facility])



                                                                                
                                                                                
                                                                                
                                                                                
                                                                                
                                                                                
                                                                                
                                                                                
                                                            



Medications

          



          Medication Brand Name Start Date Product Form Dose      Route     Admi

nistrative 

Instructions Pharmacy Instructions Status     Indications Reaction   Description

 Data 

Source(s)

 

                    Fluoxetine 40 MG Oral Capsule FLUoxetine HCl 40 MG FLUoxetin

e HCl 40 MG 

2021 12:00:00 AM EST         1.0 {capsule}                         suspend

ed                 FLUoxetine HCl 40

MG                                      eCW1 ([facility])

 

                    Fluoxetine 40 MG Oral Capsule FLUoxetine HCl 40 MG FLUoxetin

e HCl 40 MG 

2021 12:00:00 AM EST         1.0 {capsule}                         suspend

ed                 FLUoxetine HCl 40

MG                                      eCW1 ([facility])

 

                    Fluoxetine 40 MG Oral Capsule FLUoxetine HCl 40 MG FLUoxetin

e HCl 40 MG 

2021 12:00:00 AM EST         1.0 {capsule}                         suspend

ed                 FLUoxetine HCl 40

MG                                      eCW1 ([facility])

 

                    Fluoxetine 40 MG Oral Capsule FLUoxetine HCl 40 MG FLUoxetin

e HCl 40 MG 

2021 12:00:00 AM EST         1.0 {capsule}                         suspend

ed                 FLUoxetine HCl 40

MG                                      eCW1 ([facility])

 

                          Acetaminophen 325 MG / Oxycodone Hydrochloride 5 MG Or

al Tablet [Percocet] 

Percocet 2020 12:00:00 AM EDT             ORAL              active        

           [facility] ([facility], )

 

             Mirtazapine 7.5 MG Oral Tablet Mirtazapine 7.5 MG 2020 12:00:

00 AM EDT               

                                active                  Mirtazapine 7.5 MG eCW1 

([facility])

 

                    tramadol hydrochloride 50 MG Oral Tablet Tramadol HCL       

 2020 12:00:00 AM EDT

                     ORAL                 active                      MEDENT ([facility])

 

                          Acetaminophen 325 MG / Oxycodone Hydrochloride 5 MG Or

al Tablet 

Oxycodone-Acetaminophen 2020 12:00:00 AM EDT             ORAL             

 active                   

MED[facility] ([facility], )

 

                          7 ACTUAT umeclidinium 0.0625 MG/ACTUAT Dry Powder Inha

ler [Incruse] Incruse 

Ellipta 2020 12:00:00 AM EDT             RESPIRATORY             active   

                MED[facility] 

([facility], )

 

                          Tamsulosin hydrochloride 0.4 MG Oral Capsule [Flomax] 

Flomax 0.4 MG Flomax 0.4 

MG    2020 12:00:00 AM EST                               active           

  1 capsule eCW1 ([facility])

 

                    Clindamycin 300 MG Oral Capsule Clindamycin HCl 300 MG Clind

amycin HCl 300 MG 

2019 12:00:00 AM EST                                    suspended         

      2 capsules eCW1 ([facility])

 

          Acetaminophen 500 MG UNK       2019 12:00:00 AM EST           1.

0 {capsule_as_needed}           

                      active                           Acetaminophen 500 MG eCW1

 ([facility])

 

                    Clindamycin 300 MG Oral Capsule Clindamycin HCl 300 MG Clind

amycin HCl 300 MG 

2019 12:00:00 AM EST                                    suspended         

      2 capsules eCW1 ([facility])

 

          Acetaminophen 500 MG UNK       2019 12:00:00 AM EST           1.

0 {capsule_as_needed}           

                      active                           Acetaminophen 500 MG eCW1

 ([facility])

 

      Acetaminophen 500 MG UNK   2019 12:00:00 AM EST                     

          active             1 

capsule as needed                       eCW1 ([facility])

 

          Acetaminophen 500 MG UNK       2019 12:00:00 AM EST           1.

0 {capsule_as_needed}           

                      active                           Acetaminophen 500 MG eCW1

 ([facility])

 

                    Clindamycin 300 MG Oral Capsule Clindamycin HCl 300 MG Clind

amycin HCl 300 MG 

2019 12:00:00 AM EST                                    active            

   2 capsules eCW1 ([facility])

 

      Acetaminophen 500 MG UNK   2019 12:00:00 AM EST                     

          active             1 

capsule as needed                       eCW1 ([facility])

 

          Acetaminophen 500 MG UNK       2019 12:00:00 AM EST           1.

0 {capsule_as_needed}           

                      active                           Acetaminophen 500 MG eCW1

 ([facility])

 

          Acetaminophen 500 MG UNK       2019 12:00:00 AM EST           1.

0 {capsule_as_needed}           

                      active                           Acetaminophen 500 MG eCW1

 ([facility])

 

          Acetaminophen 500 MG UNK       2019 12:00:00 AM EST           1.

0 {capsule_as_needed}           

                      active                           Acetaminophen 500 MG eCW1

 ([facility])

 

      Acetaminophen 500 MG UNK   2019 12:00:00 AM EST                     

          active             1 

capsule as needed                       eCW1 ([facility])

 

      Acetaminophen 500 MG UNK   2019 12:00:00 AM EST                     

          active             1 

capsule as needed                       eCW1 ([facility])

 

      Acetaminophen 500 MG UNK   2019 12:00:00 AM EST                     

          active             1 

capsule as needed                       eCW1 ([facility])

 

          Acetaminophen 500 MG UNK       2019 12:00:00 AM EST           1.

0 {capsule_as_needed}           

                      active                           Acetaminophen 500 MG eCW1

 ([facility])

 

          Acetaminophen 500 MG UNK       2019 12:00:00 AM EST           1.

0 {capsule_as_needed}           

                      active                           Acetaminophen 500 MG eCW1

 ([facility])

 

                    tramadol hydrochloride 50 MG Oral Tablet Tramadol HCL       

 2019 12:00:00 AM EST

                     ORAL                 completed                      [facility] 

([facility])

 

                          Clonidine Hydrochloride 0.2 MG Oral Tablet Clonidine H

Cl 0.2 MG Clonidine HCl 

0.2 MG 2019 12:00:00 AM EST        1.0 {tablet}                      activ

e               Clonidine HCl 

0.2 MG                                  eCW1 ([facility])

 

                          Clonidine Hydrochloride 0.2 MG Oral Tablet Clonidine H

Cl 0.2 MG Clonidine HCl 

0.2 MG 2019 12:00:00 AM EST                               active          

   1 tablet eCW1 ([facility])

 

                          Clonidine Hydrochloride 0.2 MG Oral Tablet Clonidine H

Cl 0.2 MG Clonidine HCl 

0.2 MG 2019 12:00:00 AM EST        1.0 {tablet}                      activ

e               Clonidine HCl 

0.2 MG                                  eCW1 ([facility])

 

                          Clonidine Hydrochloride 0.2 MG Oral Tablet Clonidine H

Cl 0.2 MG Clonidine HCl 

0.2 MG 2019 12:00:00 AM EST        1.0 {tablet}                      activ

e               Clonidine HCl 

0.2 MG                                  eCW1 ([facility])

 

                          Clonidine Hydrochloride 0.2 MG Oral Tablet Clonidine H

Cl 0.2 MG Clonidine HCl 

0.2 MG 2019 12:00:00 AM EST                               active          

   1 tablet eCW1 ([facility])

 

                          Clonidine Hydrochloride 0.2 MG Oral Tablet Clonidine H

Cl 0.2 MG Clonidine HCl 

0.2 MG 2019 12:00:00 AM EST        1.0 {tablet}                      activ

e               Clonidine HCl 

0.2 MG                                  eCW1 ([facility])

 

                          Clonidine Hydrochloride 0.2 MG Oral Tablet Clonidine H

Cl 0.2 MG Clonidine HCl 

0.2 MG 2019 12:00:00 AM EST        1.0 {tablet}                      activ

e               Clonidine HCl 

0.2 MG                                  eCW1 ([facility])

 

                          Clonidine Hydrochloride 0.2 MG Oral Tablet Clonidine H

Cl 0.2 MG Clonidine HCl 

0.2 MG 2019 12:00:00 AM EST                               active          

   1 tablet eCW1 ([facility])

 

                          Clonidine Hydrochloride 0.2 MG Oral Tablet Clonidine H

Cl 0.2 MG Clonidine HCl 

0.2 MG 2019 12:00:00 AM EST                               active          

   1 tablet eCW1 ([facility])

 

                          Clonidine Hydrochloride 0.2 MG Oral Tablet Clonidine H

Cl 0.2 MG Clonidine HCl 

0.2 MG 2019 12:00:00 AM EST        1.0 {tablet}                      activ

e               Clonidine HCl 

0.2 MG                                  eCW1 ([facility])

 

                          Clonidine Hydrochloride 0.2 MG Oral Tablet Clonidine H

Cl 0.2 MG Clonidine HCl 

0.2 MG 2019 12:00:00 AM EST        1.0 {tablet}                      activ

e               Clonidine HCl 

0.2 MG                                  eCW1 ([facility])

 

                          Clonidine Hydrochloride 0.2 MG Oral Tablet Clonidine H

Cl 0.2 MG Clonidine HCl 

0.2 MG 2019 12:00:00 AM EST                               active          

   1 tablet eCW1 ([facility])

 

                          Clonidine Hydrochloride 0.2 MG Oral Tablet Clonidine H

Cl 0.2 MG Clonidine HCl 

0.2 MG 2019 12:00:00 AM EST                               active          

   1 tablet eCW1 ([facility])

 

                          Clonidine Hydrochloride 0.2 MG Oral Tablet Clonidine H

Cl 0.2 MG Clonidine HCl 

0.2 MG 2019 12:00:00 AM EST        1.0 {tablet}                      activ

e               Clonidine HCl 

0.2 MG                                  eCW1 ([facility])

 

                          200 ACTUAT Albuterol 0.09 MG/ACTUAT Metered Dose Inhal

er [Ventolin] Ventolin HFA

      2019 12:00:00 AM EST                               active           

        [facility] ([facility])

 

                    tramadol hydrochloride 50 MG Oral Tablet Tramadol HCL       

 2019 12:00:00 AM EST

                                          completed                      [facility] 

([facility])

 

                    RA Vitamin D-3 1000UNIT Oral Tablet RA Vitamin D-3 1000UNIT 

Oral Tablet 

2019 12:00:00 AM EST        1                           active            

   RA Vitamin D-3 ILANA 

([facility])



                                                                                
                                                                                
                                                                                
                                                                                
                                                                                
                                                                                
                  



Insurance Providers

          



             Payer name   Policy type / Coverage type Policy ID    Covered party

 ID Covered 

party's relationship to newby Policy Newby             Plan Information

 

          DARREN             [id number]           SP                  [id number]

300

 

          [employer] CARE [facility]         [id number]           S                   [id number]

[id number]

 

          DARREN             [id number]           SP                  [id number]

300

 

          [employer] [place] Other     0                   Self              

  0

 

          SELF PAY                                                     

 

          DARREN             [id number]                             [id number]

 

          [employer] [place] Other     0                   Self              

  0

 

          [employer]EN MEDICAID           [id number]           Carri                 7

[id number]

 

                    [employer] [id number]        

                       

[id number]

 

                    [employer] [id number]        

                       

[id number]

 

                    [employer] [id number]        

                       

[id number]

 

          [employer] [place] Medicaid  [id number]           Self            

    [id number]

 

          [facility]           [id number]           SP         

         [id number]

 

                    [employer]-Commercial [id number]        

                       

[id number]

 

          [employer] [place] Other     0                   Self              

  0

 

          [employer] [place] Other     0                   Self              

  0

 

          [place] [facility] [place] Other     0                   Self              

  0

 

          [employer] [place] Other     0                   Self              

  0

 

          SELF PAY                                                     

 

          DARREN             [id number]           SP                  [id number]

 

          [place] [facility] [place] Other     0                   Self              

  0

 

          [employer] [place] Other     0                   Self              

  0

 

          [place] [facility] [place] Other     0                   Self              

  0

 

          SELF PAY                                                     

 

          DARREN             [id number]           SP                  [id number]

 

          SELF PAY                                                     

 

          DARREN             [id number]           SP                  [id number]

 

          [facility] [place] Other     0                   Self              

  0

 

          [facility] [place] Other     0                   Self              

  0

 

          SELF PAY                                                     

 

          DARREN             [id number]           SP                  [id number]

 

          [employer] [place] Other     0                   Self              

  0

 

          [facility] [place] Other     0                   Self              

  0

 

          [facility] [place] Other     0                   Self              

  0

 

          [employer] [place] Other     0                   Self              

  0

 

          [employer] [place] Other     0                   Self              

  0

 

          [employer] [place] Other     0                   Self              

  0

 

          [place] [facility] [place] Other     0                   Self              

  0

 

          SELF PAY                                                     

 

          DARREN             [id number]           SP                  [id number]

 

          [employer] [place] Other     0                   Self              

  0

 

          [employer] [place] Other     0                   Self              

  0

 

          [employer] [place] Other     0                   Self              

  0

 

          [place] [facility] [place] Other     0                   Self              

  0

 

          [employer] [place] Other     0                   Self              

  0

 

          [facility] [place] Other     0                   Self              

  0

 

          DARREN             [id number]           [id number]                  [id number]

 

          [facility] [place] Other     0                   Self              

  0

 

          [facility] [place] Other     0                   Self              

  0

 

          [employer] [place] Other     0                   Self              

  0

 

          [employer] [place] Other     0                   Self              

  0

 

          [facility] [place] [facility] [place] Other     0                   Self              

  0

 

          [employer] [place] Other     0                   Self              

  0

 

          [facility] [place] Other     0                   Self              

  0

 

          [facility] [place] [facility] [place] Other     0                   Self              

  0

 

          [employer] [place] Individual Policy 0                   Self       

         0

 

          [facility] [place] Other     0                   Self              

  0

 

          DARREN             [id number]           SP                  [id number]

 

          [facility] [place] [facility] [place] Other     0                   Self              

  0

 

          [facility] [place] Other     0                   Self              

  0

 

          [facility] [place] Other     0                   Self              

  0

 

          [facility] [place] Other     0                   Self              

  0

 

          [facility] [place] Other     0                   Self              

  0

 

          [facility] [place] Other     0                   Self              

  0

 

          [facility] [place] Other     0                   Self              

  0

 

          [facility] [place] Other     0                   Self              

  0

 

          [facility] [place] Other     0                   Self              

  0

 

          [facility] [place] Other     0                   Self              

  0

 

          [facility] [place] Other     0                   Self              

  0

 

          [facility] [place] Other     0                   Self              

  0

 

          [facility] [place] Other     0                   Self              

  0

 

          [facility] [place] Other     0                   Self              

  0

 

          [facility] [place] Other     0                   Self              

  0

 

          [facility] [facility] [place] Other     0                   Self              

  0

 

          [facility] [place] Other     0                   Self              

  0

 

          [facility] [place] Other     0                   Self              

  0

 

          [facility] [place] Other     0                   Self              

  0

 

          [facility] [place] Other     0                   Self              

  0

 

          [facility] [place] Other     0                   Self              

  0

 

          [facility] [place] Other     0                   Self              

  0

 

          [facility] [place] Other     0                   Self              

  0

 

          [facility] [place] Other     0                   Self              

  0

 

          [facility] [place] Other     0                   Self              

  0

 

          [facility] [place] Other     0                   Self              

  0

 

          [facility] [place] Other     0                   Self              

  0

 

          [facility] [place] Other     0                   Self              

  0

 

          [facility] [place] Other     0                   Self              

  0

 

          [facility] [place] Other     0                   Self              

  0

 

          [facility] [place] Other     0                   Self              

  0

 

          [facility] [place] Other     0                   Self              

  0

 

          [facility] [place] Other     0                   Self              

  0

 

          [facility] [place] Other     0                   Self              

  0

 

          [facility] [place] Other     0                   Self              

  0

 

          [facility] [place] Other     0                   Self              

  0

 

          [facility] [place] Other     0                   Self              

  0

 

          [facility] [place] Other     0                   Self              

  0

 

          [facility] [place] Other     0                   Self              

  0

 

          [facility] [place] Other     0                   Self              

  0

 

          [facility] [place] Other     0                   Self              

  0

 

          [facility] [place] Other     0                   Self              

  0

 

          [facility] [place] Other     0                   Self              

  0

 

          [facility] [place] Other     0                   Self              

  0

 

          [facility] [place] Other     0                   Self              

  0

 

          [facility] [place] Other     0                   Self              

  0

 

          [facility] [place] Other     0                   Self              

  0

 

          [facility] [place] Other     0                   Self              

  0

 

          DARREN             [id number]           SP                  [id number]

 

          [facility] [place] Other     0                   Self              

  0

 

          [facility] [place] Other     0                   Self              

  0

 

          [facility] [place] Other     0                   Self              

  0

 

          [facility] [place] Other     0                   Self              

  0

 

          [facility] [place] Other     0                   Self              

  0

 

          [facility] [place] Other     0                   Self              

  0

 

          [facility] [place] Other     0                   Self              

  0

 

          [facility] [place] Other     0                   Self              

  0

 

          [facility] [place] Other     0                   Self              

  0

 

          [facility] [place] Other     0                   Self              

  0

 

          [facility] [place] Other     0                   Self              

  0

 

          [facility] [place] Other     0                   Self              

  0

 

          [facility] [place] Other     0                   Self              

  0

 

          [facility] [place] Other     0                   Self              

  0

 

          [facility] [place] Other     0                   Self              

  0

 

          [facility] [place] Other     0                   Self              

  0

 

          [facility] [place] Other     0                   Self              

  0

 

          [facility] [place] Other     0                   Self              

  0

 

          [facility] [place] Other     0                   Self              

  0

 

          [facility] [place] Other     0                   Self              

  0

 

          [facility] [place] Other     0                   Self              

  0

 

          [facility] [place] Other     0                   Self              

  0

 

          [facility] [place] Other     0                   Self              

  0

 

          [facility] [place] Other     0                   Self              

  0

 

          [facility] [place] Other     0                   Self              

  0

 

          [facility] [place] Other     0                   Self              

  0

 

          [facility] [place] Other     0                   Self              

  0

 

          [facility] [place] Other     0                   Self              

  0

 

          DARREN             [id number]           SP                  [id number]

 

          [facility] [employer] F         [id number]           SELF                7

[id number]

 

          DARREN             [id number]           SP                  [id number]

 

          MEDICAID [facility]           [id number]            SP                  AG

[id number]

 

          MEDICA[employer] [facility]           [id number]            SP                  AG

[id number]



                                                                                
                                                                                
                                                                                
                                                                                
                                                                                
                                                                                
                                                                                
                                                                                
                                                                                
                                                                                
                                                                                
                                                                                
                                                                                
                                                                                
                                                                                
                                                                                
                                                                                
                                                                                
                                      



Problems, Conditions, and Diagnoses

          



           Code       Display Name Description Problem Type Effective Dates Data

 Source(s)

 

             F99          [id number]     Mental disorder, not otherwise specified 

Problem      2020 

12:00:00 AM EDT                         eCW1 ([facility])

 

             F51.05       [id number]     Insomnia due to other mental disorder Pro

blem      2020 

12:00:00 AM EDT                         eCW1 ([facility])

 

                R39.14          Incomplete emptying of bladder Feeling of incomp

lete bladder emptying 

Problem                   2020 12:00:00 AM EDT eCW1 ([facility])

 

                R39.14          Incomplete emptying of bladder Feeling of incomp

lete bladder emptying 

Problem                   2020 12:00:00 AM EDT eCW1 ([facility])

 

             F33.1        [id number]    Major depressive disorder, recurrent, mod

erate Problem      

2020 12:00:00 AM EDT              eCW1 ([facility])

 

           F41.1      [id number]   Generalized anxiety disorder Problem    

020 12:00:00 AM EDT 

eCW1 ([facility])

 

             F43.10       [id number]     Post-traumatic stress disorder, unspecifi

ed Problem      2020 

12:00:00 AM EDT                         eCW1 ([facility])

 

           F41.1      [id number]   Generalized anxiety disorder Problem     12:00:00 AM EDT 

eCW1 ([facility])

 

             F43.10       [id number]     Post-traumatic stress disorder, unspecifi

ed Problem      2020 

12:00:00 AM EDT                         eCW1 ([facility])

 

             F33.1        [id number]    Major depressive disorder, recurrent, mod

erate Problem      

2020 12:00:00 AM EDT              eCW1 ([facility])

 

             M51.36       [id number]     Lumbar degenerative disc disease Problem 

     2020 12:00:00 AM 

EST                                     eCW1 ([facility])

 

             M51.36       [id number]     Lumbar degenerative disc disease Problem 

     2020 12:00:00 AM 

EST                                     eCW1 ([facility])



                                                                                
                                                                                
                                                



Surgeries/Procedures

          



             Procedure    Description  Date         Indications  Data Source(s)

 

             ECG ROUTINE ECG W/LEAST 12 LDS W/I&R              2021 12:00:

00 AM EST              eCW1 

([facility])

 

                    X-Ray Spine Lumbosacral Complete Inc Bending Views Min Of 6 

                    2021 

12:00:00 AM EST                                     [facility] ([facility] )

 

             THERAPEUTIC PX 1/> AREAS EACH 15 MIN EXERCISES               12:00:00 AM EST              

[facility] ([facility] )

 

             THERAPEUTIC PX 1/> AREAS EACH 15 MIN EXERCISES               12:00:00 AM EST              

[facility] ([facility] )

 

             APPLICATION MODALITY 1/> AREAS HOT/COLD PACKS              20 12:00:00 AM EST              

[facility] ([facility] )

 

             THERAPEUTIC PX 1/> AREAS EACH 15 MIN EXERCISES               12:00:00 AM EST              

MED[facility] ([facility] )

 

             THERAPEUTIC PX 1/> AREAS EACH 15 MIN EXERCISES               12:00:00 AM EST              

MED[facility] ([facility] )

 

             THERAPEUTIC PX 1/> AREAS EACH 15 MIN EXERCISES               12:00:00 AM EST              

MED[facility] ([facility] )

 

             THERAPEUTIC PX 1/> AREAS EACH 15 MIN EXERCISES               12:00:00 AM EST              

[facility] ([facility] )

 

             THERAPEUTIC PX 1/> AREAS EACH 15 MIN EXERCISES               12:00:00 AM EST              

[facility] ([facility] )

 

             THERAPEUTIC PX 1/> AREAS EACH 15 MIN EXERCISES               12:00:00 AM EST              

[facility] ([facility] )

 

             APPLICATION MODALITY 1/> AREAS HOT/COLD PACKS              20 12:00:00 AM EST              

[facility] ([facility] )

 

             RADEX SHOULDER COMPLETE MINIMUM 2 VIEWS              2020 12:

00:00 AM EST              [facility] 

([facility] )

 

             X-Ray Shoulder Complete              2020 12:00:00 AM EST    

          MEDENT ([facility], )

 

             APPLICATION MODALITY 1/> AREAS HOT/COLD PACKS              20 12:00:00 AM EST              

MED[facility] ([facility] )

 

             THERAPEUTIC PX 1/> AREAS EACH 15 MIN EXERCISES               12:00:00 AM EST              

[facility] ([facility] )

 

             THERAPEUTIC PX 1/> AREAS EACH 15 MIN EXERCISES               12:00:00 AM EST              

[facility] ([facility] )

 

             APPLICATION MODALITY 1/> AREAS HOT/COLD PACKS              10/30/20

20 12:00:00 AM EDT              

[facility] ([facility] )

 

             THERAPEUTIC PX 1/> AREAS EACH 15 MIN EXERCISES              10/30/2

020 12:00:00 AM EDT              

[facility] ([facility] )

 

             Spirometry                10/26/2020 12:00:00 AM EDT              M

EDENT ([facility], 

)

 

             THERAPEUTIC PX 1/> AREAS EACH 15 MIN EXERCISES              10/07/2

020 12:00:00 AM EDT              

[facility] ([facility] )

 

                    Re-Eval Of PT Established Plan Of Care 20Mins Face To Face P

T/Fam                     10/07/2020 

12:00:00 AM EDT                                     [facility] ([facility]

[facility] )

 

             THERAPEUTIC PX 1/> AREAS EACH 15 MIN EXERCISES              10/02/2

020 12:00:00 AM EDT              

MED[facility] ([facility] )

 

             THERAPEUTIC PX 1/> AREAS EACH 15 MIN EXERCISES               12:00:00 AM EDT              

MED[facility] ([facility])

 

             APPLICATION MODALITY 1/> AREAS HOT/COLD PACKS              20 12:00:00 AM EDT              

[facility] ([facility])

 

             THERAPEUTIC PX 1/> AREAS EACH 15 MIN EXERCISES               12:00:00 AM EDT              

[facility] ([facility])

 

             APPLICATION MODALITY 1/> AREAS HOT/COLD PACKS              20 12:00:00 AM EDT              

[facility] ([facility])

 

             THERAPEUTIC PX 1/> AREAS EACH 15 MIN EXERCISES               12:00:00 AM EDT              

[facility] ([facility])

 

             APPLICATION MODALITY 1/> AREAS HOT/COLD PACKS              20 12:00:00 AM EDT              

[facility] ([facility])

 

             THERAPEUTIC PX 1/> AREAS EACH 15 MIN EXERCISES               12:00:00 AM EDT              

[facility] ([facility])

 

             THERAPEUTIC PX 1/> AREAS EACH 15 MIN EXERCISES               12:00:00 AM EDT              

MED[facility] ([facility])

 

             THERAPEUTIC PX 1/> AREAS EACH 15 MIN EXERCISES               12:00:00 AM EDT              

[facility] ([facility])

 

             APPLICATION MODALITY 1/> AREAS HOT/COLD PACKS              20 12:00:00 AM EDT              

[facility] ([facility])

 

             THERAPEUTIC PX 1/> AREAS EACH 15 MIN EXERCISES               12:00:00 AM EDT              

[facility] ([facility])

 

                    Re-Eval Of PT Established Plan Of Care 20Mins Face To Face P

T/Fam                     2020 

12:00:00 AM EDT                                     [facility] ([facility]

 

             APPLICATION MODALITY 1/> AREAS HOT/COLD PACKS              20 12:00:00 AM EDT              

[facility] ([facility])

 

             THERAPEUTIC PX 1/> AREAS EACH 15 MIN EXERCISES               12:00:00 AM EDT              

[facility] ([facility])

 

             APPLICATION MODALITY 1/> AREAS HOT/COLD PACKS              20 12:00:00 AM EDT              

[facility] ([facility])

 

             THERAPEUTIC PX 1/> AREAS EACH 15 MIN EXERCISES               12:00:00 AM EDT              

[facility] ([facility])

 

             APPLICATION MODALITY 1/> AREAS HOT/COLD PACKS              20 12:00:00 AM EDT              

[facility] ([facility] )

 

             THERAPEUTIC PX 1/> AREAS EACH 15 MIN EXERCISES              

020 12:00:00 AM EDT              

[facility] ([facility] )

 

             Physical Therapy Eval - Low Complexity              2020 12:0

0:00 AM EDT              MED[facility] 

([facility] )

 

                    Arthroscopy Shoulder Removal Loose/Foreign Body Dist Clavicu

lecto                     2020 

12:00:00 AM EDT                                     [facility]

 

             SHOULDER SCOPE BONE SHAVING              2020 12:00:00 AM EDT

              [facility] ([facility] )

 

             ARTHROSCOPY SHOULDER DISTAL CLAVICULECTOMY              2020 

12:00:00 AM EDT              MEDENT

 ([facility], )

 

             SHOULDER SCOPE BONE SHAVING              2020 12:00:00 AM EDT

              MED[facility] ([facility], )

 

             Physical Therapy Eval - Low Complexity              2020 12:0

0:00 AM EDT              [facility] 

([facility] )

 

                    Endoscopy Wrist W/Release Transverse Carpal Ligament        

             2020 12:00:00 AM 

EDT                                                 [facility]

 

             PSYTX W PT 45 MINUTES              2020 12:00:00 AM EDT      

        eCW1 ([facility])

 

             ARTHROSCOPY SHOULDER SURG DEBRIDEMENT EXTENSIVE              2020 12:00:00 AM EDT              

MEDENT ([facility], )

 

             ARTHROSCOPY SHOULDER DISTAL CLAVICULECTOMY              2020 

12:00:00 AM EDT              MEDENT

 ([facility], )

 

             SHOULDER SCOPE BONE SHAVING              2020 12:00:00 AM EDT

              MED[facility] ([facility], )

 

             US URINE CAPACITY MEASURE              2020 12:00:00 AM EDT  

            eCW1 ([facility])

 

             TeleMedicine Est. Pt. Level 1              2020 12:00:00 AM E

DT              eCW1 ([facility])

 

             PSYCH DIAGNOSTIC EVALUATION              2020 12:00:00 AM EST

              eCW1 ([facility])

 

             RADEX SHOULDER COMPLETE MINIMUM 2 VIEWS              2020 12:

00:00 AM EST              MED[facility] 

([facility])

 

             X-Ray Shoulder Complete              2020 12:00:00 AM EST    

          [facility] ([facility]

 

             RADEX WRIST 2 VIEWS              2020 12:00:00 AM EST        

      [facility] ([facility]

 

                    Endoscopy Wrist W/Release Transverse Carpal Ligament        

             2019 12:00:00 AM 

EST                                                 [facility] ([facility]

 

             ARTHROCENTESIS ASPIR&/INJECTION MAJOR JT/BURSA              

019 12:00:00 AM EST              

MED[facility] ([facility])

 

             RADEX SHOULDER COMPLETE MINIMUM 2 VIEWS              2019 12:

00:00 AM EST              MED[facility] 

([facility])

 

             RADEX WRIST COMPLETE MINIMUM 3 VIEWS              2019 12:00:

00 AM EST              [facility] 

([facility])

 

             RADEX SHOULDER COMPLETE MINIMUM 2 VIEWS              2019 12:

00:00 AM EST              MED[facility] 

([facility])



                                                                                
                                                                                
                                                                                
                                                                                
                                                                                
                                                                                
                                                                                
                                                                                
                                                                    



Results

          



                    ID                  Date                Data Source

 

                    [id number]         2021 12:30:00 PM EST [facility]









          Name      Value     Range     Interpretation Code Description Data Beth

rce(s) Supporting 

Document(s)

 

          SARS coronavirus 2 RNA Not Detected                               [facility]

[place]     

 

                                        This lab was ordered by [facility] and reported by [facility]. 









                    ID                  Date                Data Source

 

                    Basic Metabolic Profile (BMP) 2021 12:00:00 AM EST eCW

1 ([facility])









          Name      Value     Range     Interpretation Code Description Data Beth

rce(s) Supporting 

Document(s)

 

                    78                      GLUCOSE, FASTING eCW1 ([facility])  

 

                    12        7-18                BLOOD UREA NITROGEN eCW1 ([facility])  

 

                    1.02      0.70-1.30           CREATININE FOR GFR eCW1 ([facility])  

 

                    > 60.0    >60                 GLOMERULAR FILTRATION RATE eCW

1 ([facility]) 

 

 

                    4.6       3.5-5.1             POTASSIUM SERUM eCW1 ([facility])  

 

                    140       136-145             SODIUM LEVEL eCW1 (Replaced by [facility])  

 

                    108                     CHLORIDE LEVEL eCW1 ([facility])  

 

                    9.3       8.5-10.1            CALCIUM LEVEL eCW1 ([facility])  

 

                    28        21-32               CARBON DIOXIDE LEVEL eCW1 ([facility])  









                    ID                  Date                Data Source

 

                    [id number]          2020 12:00:00 AM EST [facility]

[facility]

 

                                        

________________________________________________________________________________

Jose Guerra MD           Patient Name: LAMBERT SHIN [facility]         
       YOB: 1975Suite                             Date of Exam: 
2020ALIYAH Watts  [id number]#: [phone]: 
[phone]______________________________________________________________________

__________EXAM: MRI SHOULDER LEFT W/O&W/CONTRAST ARTHROGRAMCLINICAL INFORMATION:
   Pain left shoulder after re-injury.Pre and post contrast 3T MRI of the left 
shoulder with shoulder MRarthrogram was performed utilizing various 
sequences.Comparison MRI 2020.  The patient in the interval has had 
surgery2020 with left shoulder arthroscopy, distal clavicle 
excision,subacromial decompression and debridement of undersurface rotator 
cufftear.There is a full thickness partial tear of the distal supraspinatus 
tendonsomewhat posteriorly at the site of the prior tear.Tendinopathy/tendinitis
 is noted of the remaining supraspinatus tendon.The other rotator cuff tendons 
are intact.  Fluid is seen between thedistal end of the clavicle and the 
acromion.  The acromion is Type II.  Thebiceps tendon is within the bicipital 
groove with mild surrounding fluid.There is no Hill-Sachs deformity.  The 
deltoid muscle demonstrates noabnormal signal.  The biceps labral complex and 
labrum appear intact.  NoSLAP tear is seen.  There is no bone marrow edema or 
occult fracture.There is a normal amount of joint fluid 
otherwise.IMPRESSION:Full thickness partial tear at the posterior aspect of the 
supraspinatustendon.  No other evidence of rotator cuff tear.  Mild fluid is 
seenbetween the distal end of the clavicle and the acromion.  No evidence ofSLAP
 tear or labral tear.Accredited by the [employer] in 
MR.Trenton Modi, MDDSG/Asael you for referring AMAN SHIN to our 
office.Electronically Signed - TRENTON MODI MD  20 17:19   









          Name      Value     Range     Interpretation Code Description Data Beth

rce(s) Supporting 

Document(s)

 

                                                                       









                    ID                  Date                Data Source

 

                    [id number]          2020 12:00:00 AM EST [facility]

 

                                        

________________________________________________________________________________

Jose Guerra MD           Patient Name: AMAN SHIN[address] [facility]         
       YOB: 1975[address]                             Date [facility] Exam[facility] 
[facility]  [id number]#: [phone]: 
[phone]______________________________________________________________________

__________EXAM: INJECTION PROCEDURE FOR SHOULDER ARTHROGRAMLEFT SHOULDER 
ARTHROGRAM:The procedure was performed by SANCHEZ Mcnair, under the 
directsupervision of Dr. Modi.The benefits and risks including but not limited t
o pain, infection,bleeding and anaphylaxis were explained to the patient and 
informed consentwas obtained.  The left glenohumeral joint space was localized 
usingfluoroscopic guidance.  The skin was prepped and draped in a 
sterilefashion.  1% Lidocaine was used as a local anesthetic.  Using 
fluoroscopicguidance, a #22 gauge spinal needle was inserted and advanced into 
thejoint.  1 cc of Omnipaque 300 was injected to verify placement.  11 cc of 
asolution containing 20 cc of sterile saline and 0.15 cc of ProHance wasinjected
 into the joint space.  The needle was removed and the patient wastaken to MRI 
for post procedural imaging.The patient tolerated the procedure well and there 
were no immediatecomplications.Fluoroscopic images are performed with last image
 hold technology.  Theseimages require no additional radiation to 
acquire.Fluoroscopy time was 2 seconds at 3 pulses/second.  This is equal to 
0.5seconds continuous fluoroscopy time which is a 75% reduction in 
radiation.Dictated by SANCHEZ Mcnair, with Dr. Modi.Trenton Modi, 
DONALDSG/jmcTpj you for referring AMAN SHIN to our office.Electronically Signed 
- TRENTON MODI MD  20 18:58   









          Name      Value     Range     Interpretation Code Description Data Beth

rce(s) Supporting 

Document(s)

 

                                                                       









                    ID                  Date                Data Source

 

                    [id number]          2020 12:00:00 AM EST [facility]

 

                                        

________________________________________________________________________________

Jose Guerra MD           Patient Name: AMAN SHIN[address] [facility]         
       YOB: 1975[address]                             Date of Exam: 
[facility]  [id number]#: [phone]: 
[phone]______________________________________________________________________

__________EXAM: INJECTION PROCEDURE FOR SHOULDER ARTHROGRAMLEFT SHOULDER 
ARTHROGRAM:The procedure was performed by [facility], under the 
directsupervision of Dr. Modi.The benefits and risks including but not limited t
o pain, infection,bleeding and anaphylaxis were explained to the patient and 
informed consentwas obtained.  The left glenohumeral joint space was localized 
usingfluoroscopic guidance.  The skin was prepped and draped in a 
sterilefashion.  1% Lidocaine was used as a local anesthetic.  Using 
fluoroscopicguidance, a #22 gauge spinal needle was inserted and advanced into 
thejoint.  1 cc of Omnipaque 300 was injected to verify placement.  11 cc of 
asolution containing 20 cc of sterile saline and 0.15 cc of ProHance wasinjected
 into the joint space.  The needle was removed and the patient wastaken to MRI 
for post procedural imaging.The patient tolerated the procedure well and there 
were no immediatecomplications.Fluoroscopic images are performed with last image
 hold technology.  Theseimages require no additional radiation to 
acquire.Fluoroscopy time was 2 seconds at 3 pulses/second.  This is equal to 
0.5seconds continuous fluoroscopy time which is a 75% reduction in 
radiation.Dictated by SANCHEZ Mcnair, with Dr. Modi.AZAR Roberts/Asael you for referring AMAN SHIN to our office.Electronically Signed 
- TRENTON MODI MD  20 18:58   









          Name      Value     Range     Interpretation Code Description Data Beth

e(s) Supporting 

Document(s)

 

                                                                       









                    ID                  Date                Data Source

 

                    [id number]         2020 08:25:00 AM EDT [facility] ([facility], )









          Name      Value     Range     Interpretation Code Description Data Beth

rce(s) Supporting 

Document(s)

 

           Coronavirus 2019 Nasopharygeal Laboratory test result                

                  [facility] ([facility], )                    

 

                                        Testing was performed using the hollie(R)

 SARS-CoV-2 test.

This test was developed and its performance characteristics

determined by [facility]. This test has not been

FDA cleared or approved. This test has been authorized by

FDA under an Emergency Use Authorization (EUA). This test

is only authorized for the duration of time the declaration

that circumstances exist justifying the authorization of

the emergency use of in vitro diagnostic tests for

detection of SARS-CoV-2 virus and/or diagnosis of COVID-19

infection under section 564(b)(1) of the Act, 21 U.S.C.

                                        360bbb-3(b)(1), unless the authorization

 is terminated or

revoked sooner. When diagnostic testing is negative, the

possibility of a false negative result should be considered

in the context of a patient's recent exposures and the

presence of clinical signs and symptoms consistent with

COVID-19. An individual without symptoms of COVID-19 and

who is not shedding SARS-CoV-2 virus would expect to have a

negative (not detected) result in this assay.

Performed at:  [facility]  [id number]

: Araceli B Reyes MD, Phone:  [phone]



Not Detected



 









                    ID                  Date                Data Source

 

                    [id number]         2020 08:25:00 AM EDT [facility]









          Name      Value     Range     Interpretation Code Description Data Beth

rce(s) Supporting 

Document(s)

 

          SARS CORONAVIRUS 2 RNA                                         [facility]

    

 

                                        This lab was ordered by [facility] and reported by [facility]. 









                    ID                  Date                Data Source

 

                    [id number]             2020 11:00:00 AM EDT [facility] ([facility])









          Name      Value     Range     Interpretation Code Description Data Beth

rce(s) Supporting 

Document(s)

 

           Laboratory test finding (navigational concept) Laboratory test result

                                  

[facility] ([facility])    

 

                                        Testing was performed using the hollie(R)

 SARS-CoV-2 test.

This test was developed and its performance characteristics

determined by [facility]. This test has not been

FDA cleared or approved. This test has been authorized by

FDA under an Emergency Use Authorization (EUA). This test

is only authorized for the duration of time the declaration

that circumstances exist justifying the authorization of

the emergency use of in vitro diagnostic tests for

detection of SARS-CoV-2 virus and/or diagnosis of COVID-19

infection under section 564(b)(1) of the Act, 21 U.S.C.

                                        360bbb-3(b)(1), unless the authorization

 is terminated or

revoked sooner. When diagnostic testing is negative, the

possibility of a false negative result should be considered

in the context of a patient's recent exposures and the

presence of clinical signs and symptoms consistent with

COVID-19. An individual without symptoms of COVID-19 and

who is not shedding SARS-CoV-2 virus would expect to have a

negative (not detected) result in this assay.

Performed at:   - [facility] [address]  [id number]

: Araceli B Reyes MD, Phone:  [phone]



Not Detected



 









                    ID                  Date                Data Source

 

                    [id number]         2020 11:00:00 AM EDT [facility]









          Name      Value     Range     Interpretation Code Description Data Beth

rce(s) Supporting 

Document(s)

 

          SARS CORONAVIRUS 2 RNA                                         [facility]

    

 

                                        This lab was ordered by [facility] and reported by [facility]. 









                    ID                  Date                Data Source

 

                    [id number]              2020 01:34:00 PM EDT [facility] ([facility])









          Name      Value     Range     Interpretation Code Description Data Beth

rce(s) Supporting 

Document(s)

 

           Laboratory test finding (navigational concept) Laboratory test result

                                  

[facility] ([facility])  









                    ID                  Date                Data Source

 

                    [id number]         2020 02:51:00 PM EDT [facility] ([facility])









          Name      Value     Range     Interpretation Code Description Data Beth

rce(s) Supporting 

Document(s)

 

           Red Blood Count 4.84 10    4.30-6.10  Normal (applies to non-numeric 

results)            

[facility] ([facility])  

 

           White Blood Count 9.2 10     4.0-10.0   Normal (applies to non-numeri

c results)            

[facility])  

 

           Hemoglobin 15.5 g/dL  13.5-17.5  Normal (applies to non-numeric resul

ts)            [facility])         

 

                Mean Corpuscular Hemoglobin 32.0 pg         27.0-33.0       Norm

al (applies to non-numeric 

results)                                [facility]) 

 

 

                Mean Corpuscular Volume 95.9 fl         80.0-96.0       Normal (

applies to non-numeric 

results)                                [facility]) 

 

 

           Hematocrit 46.4 %     42.0-52.0  Normal (applies to non-numeric resul

ts)            [facility])         

 

                Red Cell Distribution Width 12.5 %          11.5-14.5       Norm

al (applies to non-numeric 

results)                                [facility] ([facility]) 

 

 

                Platelet Count, Automated 304 10          150-450         Normal

 (applies to non-numeric results)

                                        [facility] ([facility]) 

 

 

                Mean Corpuscular HGB Conc 33.4 g/dL       32.0-36.5       Normal

 (applies to non-numeric 

results)                                [facility] ([facility]) 

 

 

           Nucleated Red Blood Cell % 0.0 %      0-0        Normal (applies to n

on-numeric results)            

[facility]  









                    ID                  Date                Data Source

 

                    [id number]         2020 02:51:00 PM EDT [facility] ([facility])









          Name      Value     Range     Interpretation Code Description Data Beth

rce(s) Supporting 

Document(s)

 

           Potassium Serum 4.5 meq/L  3.5-5.1    Normal (applies to non-numeric 

results)            

[facility]  

 

           Sodium Level 139 meq/L  136-145    Normal (applies to non-numeric res

ults)            [facility] 

([facility])         

 

           Carbon Dioxide Level 30 meq/L   21-32      Normal (applies to non-num

ebrnarda results)            

[facility]  

 

           Anion Gap  4 meq/L    8-16       Below low normal            [facility]

[facility])                                      

 

           Chloride Level 105 meq/L       Normal (applies to non-numeric r

esults)            [facility]

 ([facility])        









                    ID                  Date                Data Source

 

                    [id number]              2020 02:35:00 PM EDT [facility]









          Name      Value     Range     Interpretation Code Description Data Beth

rce(s) Supporting 

Document(s)

 

           Laboratory test finding (navigational concept) Laboratory test result

                                  

[facility] ([facility])  









                    ID                  Date                Data Source

 

                    [id number]          2020 12:00:00 AM EST [facility]

 

                                        

________________________________________________________________________________

Henry Trivedi MD               Patient Name: AMAN SHIN[address] [facility]         
       YOB: 1975[place]  [address]              Date of Exam: 
2020#: [phone]: 
[phone]______________________________________________________________________

__________EXAM: MRI SHOULDER LEFT W/O CONTRASTCLINICAL INFORMATION:   
Osteoarthritis left shoulder with pain.  Evaluatefor rotator cuff tear or 
other.COMPARISON:  10/17/16 MRI at [facility], delay in report 
awaitingarrival of that outside study.TECHNIQUE:3T multiplanar MRI imaging of 
the left shoulder was obtained using varioussequences.FINDINGS:Some hypertrophic
 changes of the AC joint are greater superiorly thaninferiorly but not changed 
from the previous study and the AC joint doesnot indent the musculotendinous 
junction of the rotator cuff.  However,there is a peripheral acromial spur which
 does contribute to some bursalsurface fraying and subdeltoid bursal fluid.  
Supraspinatus tendinopathyand some linear intrasubstance signal representing a 
high-grade partialtear near the insertional footprint of the supraspinatus on 
the greatertuberosity humeral head.  I do not see a definite full thickness tear
 orcomplete tear and no retraction of the tendon nor atrophy of the musclebelly.
  Tendinopathy is seen in the cax-qi-zsllko fibers of thesupraspinatus.  There 
is also thickening and signal abnormalityrepresenting tendinopathy of the 
subscapularis.  The biceps tendon isseated in its groove and the intraarticular 
course of the biceps wasunremarkable.  The infraspinatus and subscapularis 
tendons are without atear.  Muscles for those other three tendons are without 
atrophy nor is thesupraspinatus.  There is a small amount of subcoracoid bursal 
fluid.  Theglenoid labrum shows no tear superiorly to suggest SLAP lesion.  
Posteriorlabrum where a small tear is suggested on the previous study is 
grosslyunchanged.  Anterior labrum without gross tear.  No glenohumeral 
jointeffusion or loose body.  The coracoclavicular and coracohumeral 
ligamentsgrossly intact.IMPRESSION:1.  Supraspinatus tendinopathy and partial 
tear peripherally alonginsertional footprint fibers at the greater tuberosity 
humeral head butwithout a complete tear, retraction of the tendon or atrophy of 
its musclebelly.2.  Small amount of subdeltoid bursal fluid noted with periphe
ral acromialspur and bursal surface fraying supraspinatus.3.  Subscapularis 
tendon with increased signal and thickening near itsinsertion without a tear or 
muscle atrophy.4.  The infraspinatus and teres minor tendons and muscles grossly
 intact.5.  Posterior labrum with small tear suggested but superior labrum 
withoutgross tear.  Exam may be less sensitive in absence of joint effusion 
orarthrogram contrast.6.  Coracoclavicular and coracohumeral ligaments intact.  
Biceps tendonsand the biceps labral complex along with the intraarticular course
 of thattendon intact.Accredited by the [employer] in 
MR.Jorge Conner, MDPJL/Asael you for referring AMAN SHIN to our 
office.Electronically Signed - JORGE CONNER MD  20 14:06   









          Name      Value     Range     Interpretation Code Description Data Beth

rce(s) Supporting 

Document(s)

 

                                                                       









                    ID                  Date                Data Source

 

                    [id number]              2020 03:15:00 PM EST [facility] ([facility]









          Name      Value     Range     Interpretation Code Description Data Beth

rce(s) Supporting 

Document(s)

 

           Laboratory test finding (navigational concept) <pending>             

                      [facility] ([facility])                  







                                        Procedure

 

                                          



                                                                                
                                                                                
                                                                              



Social History

          



           Code       Duration   Value      Status     Description Data Source(s

)

 

           Smoking    2021 12:00:00 AM EST Current Smoker completed  Curre

nt Smoker eCW1 

([facility])

 

           Smoking    2021 12:00:00 AM EST Current Smoker completed  Curre

nt Smoker eCW1 

([facility])

 

           Smoking    2021 12:00:00 AM EST Current Smoker completed  Curre

nt Smoker eCW1 

([facility])

 

           Smoking    2021 12:00:00 AM EST Current Smoker completed  Curre

nt Smoker eCW1 

([facility])

 

           Smoking    2020 12:00:00 AM EDT Current Smoker completed  Curre

nt Smoker eCW1 

([facility])

 

           Smoking    2020 12:00:00 AM EDT Current Smoker completed  Curre

nt Smoker eCW1 

([facility])

 

           Smoking    2020 12:00:00 AM EDT Current Smoker completed  Curre

nt Smoker eCW1 

([facility])

 

           Smoking    2020 12:00:00 AM EDT Current Smoker completed  Curre

nt Smoker eCW1 

([facility])



                                                                                
                                                                                
        



Vital Signs

          



                    ID                  Date                Data Source

 

                    UNK                                      









           Name       Value      Range      Interpretation Code Description Data

 Source(s)

 

           Diastolic blood pressure 70 mm[Hg]                        70 mm[Hg]  

eCW1 ([facility])

 

           Systolic blood pressure 130 mm[Hg]                       130 mm[Hg] e

CW1 ([facility])

 

           Body temperature 97.6 [degF]                       97.6 [degF] eCW1 (

[facility])

 

           Respiratory rate 20 /min                          20 /min    eCW1 ([facility])

 

           Heart rate 107 /min                         107 /min   eCW1 ([facility])

 

           Body mass index (BMI) [Ratio] 31.15 kg/m2                       31.15

 kg/m2 W1 ([facility])

 

           Body height 66 [in_i]                        66 [in_i]  eCW1 ([facility])

 

           Body weight 193 [lb_av]                       193 [lb_av] eCW1 ([facility])

 

           Body mass index (BMI) [Ratio] 31.8 kg/m2                       31.8 k

g/m2 [facility] ([facility]

 

           Body weight 191.00 [lb_av]                       191.00 [lb_av] [facility]

T ([facility])

 

           Body height 65 [in_i]                        65 [in_i]  [facility] ([facility])

 

                                        5'5" 

 

           Body temperature 96.8 [degF]                       96.8 [degF] [facility]

 ([facility])

 

           Body temperature 97.3 [degF]                       97.3 [degF] [facility]

 ([facility])

 

           Body temperature 97.1 [degF]                       97.1 [degF] [facility]

 ([facility]

 

           Body weight 87.998 kg                        87.998 kg  [facility] ([facility])

 

           Ideal body weight 142 [lb_av]                       142 [lb_av] [facility]

T ([facility])

 

           Body mass index (BMI) [Ratio] 31.3 kg/m2                       31.3 k

g/m2 [facility] ([facility])

 

           Body weight 194.00 [lb_av]                       194.00 [lb_av] [facility]

T ([facility])

 

           Body height 66 [in_i]                        66 [in_i]  [facility] ([facility])

 

                                        5'6" 

 

           Oxygen saturation in Arterial blood by Pulse oximetry 95 %           

                  95 %       [facility] 

([facility])

 

                                        Room Air 

 

           Heart rate 73 /min                          73 /min    [facility] ([facility])

 

           Diastolic blood pressure 70 mm[Hg]                        70 mm[Hg]  

[facility] ([facility])

 

           Systolic blood pressure 114 mm[Hg]                       114 mm[Hg] M

[facility]

 

           Body temperature 95.0 [degF]                       95.0 [degF] [facility]

 ([facility])

 

           Diastolic blood pressure 78 mm[Hg]                        78 mm[Hg]  

eCW1 ([facility])

 

           Systolic blood pressure 148 mm[Hg]                       148 mm[Hg] e

CW1 ([facility])

 

           Body temperature 97.2 [degF]                       97.2 [degF] eCW1 (

[facility])

 

           Respiratory rate 18 /min                          18 /min    eCW1 ([facility])

 

           Heart rate 83 /min                          83 /min    eCW1 ([facility])

 

           Body mass index (BMI) [Ratio] 30.66 kg/m2                       30.66

 kg/m2 W1 ([facility])

 

           Body height 66 [in_us]                       66 [in_us] eCW1 ([facility])

 

           Body weight Measured 190 [lb_av]                       190 [lb_av] eC

W1 ([facility])

 

           Body weight 86.638 kg                        86.638 kg  [facility]

 

           Body mass index (BMI) [Ratio] 30.8 kg/m2                       30.8 k

g/m2 [facility]

 

           Body weight 191.00 [lb_av]                       191.00 [lb_av] MEDEN

T ([facility])

 

           Body height 66 [in_i]                        66 [in_i]  [facility]

 

                                        5'6" 

 

           Body temperature 100.0 [degF]                       100.0 [degF] MEDE

NT ([facility])

 

           Oxygen saturation in Arterial blood by Pulse oximetry 97 %           

                  97 %       [facility] 

([facility])

 

                                        Room Air 

 

           Heart rate 80 /min                          80 /min    [facility] ([facility])

 

           Diastolic blood pressure 80 mm[Hg]                        80 mm[Hg]  

[facility] ([facility])

 

           Systolic blood pressure 120 mm[Hg]                       120 mm[Hg] M

[facility]

 

           Body mass index (BMI) [Ratio] 30.50 kg/m2                       30.50

 kg/m2 W1 ([facility])

 

           Body height 66 [in_us]                       66 [in_us] eCW1 ([facility])

 

           Body weight Measured 189 [lb_av]                       189 [lb_av] eC

W1 ([facility])

 

           Diastolic blood pressure 86 mm[Hg]                        86 mm[Hg]  

eCW1 ([facility])

 

           Systolic blood pressure 122 mm[Hg]                       122 mm[Hg] e

CW1 ([facility])

 

           Body temperature 96.7 [degF]                       96.7 [degF] eCW1 (

[facility])

 

           Respiratory rate 18 /min                          18 /min    eCW1 ([facility])

 

           Heart rate 68 /min                          68 /min    eCW1 ([facility])

 

           Body mass index (BMI) [Ratio] 31.15 kg/m2                       31.15

 kg/m2 W1 ([facility])

 

           Body height 66 [in_us]                       66 [in_us] eCW1 ([facility])

 

           Body weight Measured 193 [lb_av]                       193 [lb_av] eC

W1 ([facility])

 

           Body temperature 97.7 [degF]                       97.7 [degF] MEDENT

 ([facility] 

)

 

           Diastolic blood pressure 78 mm[Hg]                        78 mm[Hg]  

eCW1 ([facility])

 

           Systolic blood pressure 124 mm[Hg]                       124 mm[Hg] e

CW1 ([facility])

 

           Body temperature 97.6 [degF]                       97.6 [degF] eCW1 (

[facility])

 

           Respiratory rate 18 /min                          18 /min    eCW1 ([facility])

 

           Heart rate 102 /min                         102 /min   eCW1 ([facility])

 

           Body mass index (BMI) [Ratio] 31.31 kg/m2                       31.31

 kg/m2 eCW1 ([facility])

 

           Body height 66 [in_us]                       66 [in_us] eCW1 ([facility])

 

           Body weight Measured 194 [lb_av]                       194 [lb_av] eC

W1 ([facility])

 

           Diastolic blood pressure 80 mm[Hg]                        80 mm[Hg]  

eCW1 ([facility])

 

           Systolic blood pressure 120 mm[Hg]                       120 mm[Hg] e

CW1 ([facility])

 

           Body temperature 98.7 [degF]                       98.7 [degF] eCW1 (

[facility])

 

           Respiratory rate 17 /min                          17 /min    eCW1 ([facility])

 

           Heart rate 86 /min                          86 /min    eCW1 ([facility])

 

           Body mass index (BMI) [Ratio] 30.99 kg/m2                       30.99

 kg/m2 eCW1 ([facility])

 

           Body height 66 [in_us]                       66 [in_us] eCW1 ([facility])

 

           Body weight Measured 192 [lb_av]                       192 [lb_av] eC

W1 ([facility])

 

           Body mass index (BMI) [Ratio] 30.8 kg/m2                       30.8 k

g/m2 MEDENT ([facility])

 

           Body weight 188.00 [lb_av]                       188.00 [lb_av] MEDEN

T ([facility])

 

           Body height 65.5 [in_i]                       65.5 [in_i] MEDENT ([facility])

 

                                        5'5.50" 

 

           Body temperature 97.7 [degF]                       97.7 [degF] MEDENT

 ([facility])



                                                                                
                  



Patient Treatment Plan of Care

          



             Planned Activity Planned Date Details      Description  Data Source

(s)

 

                          Tamsulosin hydrochloride 0.4 MG Oral Capsule [Flomax] 

2020 12:00:00 AM EST

                                                            eCW1 ([facility])

 

             Acetaminophen 500 MG 2019 12:00:00 AM EST                    

       eCW1 ([facility])

 

             Acetaminophen 500 MG 2019 12:00:00 AM EST                    

       eCW1 ([facility])

 

             Acetaminophen 500 MG 2019 12:00:00 AM EST                    

       eCW1 ([facility])

 

             Acetaminophen 500 MG 2019 12:00:00 AM EST                    

       eCW1 ([facility])

 

             Clindamycin 300 MG Oral Capsule 2019 12:00:00 AM EST         

                  eCW1 ([facility])

 

             Acetaminophen 500 MG 2019 12:00:00 AM EST                    

       eCW1 ([facility])

 

             Clonidine Hydrochloride 0.2 MG Oral Tablet 2019 12:00:00 AM E

ST                           eCW1 

([facility])

 

             Clonidine Hydrochloride 0.2 MG Oral Tablet 2019 12:00:00 AM E

ST                           eCW1 

([facility])

 

             Clonidine Hydrochloride 0.2 MG Oral Tablet 2019 12:00:00 AM E

ST                           eCW1 

([facility])

 

             Clonidine Hydrochloride 0.2 MG Oral Tablet 2019 12:00:00 AM E

ST                           eCW1 

([facility])

 

             Clonidine Hydrochloride 0.2 MG Oral Tablet 2019 12:00:00 AM E

ST                           eCW1 

([facility])

## 2021-01-27 NOTE — CCD
Summarization of Episode Note

                             Created on: 12/15/2020



AMAN SHIN JULES

External Reference #: [id number]

: 1975

Sex: Male



Demographics





                          Address                   [address]

[place]  [address]

 

                          Home Phone                [phone]

 

                          Preferred Language        Unknown

 

                          Marital Status            Unknown

 

                          Yazdanism Affiliation     Unknown

 

                          Race                      White

 

                          Ethnic Group              Not  or 





Author





                          Author                    [facility]

ems

 

                          Organization              [facility]

ems

 

                          Address                   Unknown

 

                          Phone                     Unavailable







Support





                Name            Relationship    Address         Phone

 

                    AMAN SHIN         GUAR                [address]

[place], [place]  [id number] [phone]

 

                    Ana Shin          ECON                [address]

[place]  [address]                       [phone]







Care Team Providers





                    Care Team Member Name Role                Phone

 

                    Mirta Garay  Unavailable         [phone]







PROBLEMS





          Type      Condition ICD9-CM Code NPK99-FT Code Onset Dates Condition S

tatus SNOMED 

Code                                    Notes

 

        Problem Neurogenic bladder         N31.9           Active  476793069  

 

        Problem Body mass index (BMI) 34.0-34.9, adult         Z68.34          A

ctive  658006536  

 

        Problem Urge incontinence         N39.41          Active  68832877  

 

        Problem Exercise-induced asthma         J45.990         Active  30134146

  

 

                Problem         Gastroesophageal reflux disease, esophagitis pre

sence not specified                 

K21.9                           Active          744475120       He is on omepraz

ole with control of his symptoms. He 

has seen a gastroenterologist in the past. No records available.

 

          Problem   Major depressive disorder, single episode, unspecified      

     F32.9               Active    

[id number]                                 

 

           Problem    Chronic obstructive pulmonary disease, unspecified COPD ty

pe            J44.9                 

Active                    18157739                  He apparently carries this d

iagnosis and is maintained on 

Spiriva and Breo. No active exacerbation. Unfortunately he is still smoking. No 
PFTs are readily available in the medical record. He has been advised to quit 
smoking.

 

        Problem Anxiety disorder, unspecified         F41.9           Active  23

4495691 He is on 

clonidine and high-dose fluoxetine. Symptoms are palliated.

 

        Problem Chronic kidney disease, stage 3 (moderate)         N18.3        

   Active  052902556 

Most recent lab work in 2019 was not remarkable.

 

        Problem Lumbar degenerative disc disease         M51.36          Active 

 10975992  

 

        Problem Insomnia due to other mental disorder         F51.05          Ac

tive  86120687  

 

        Problem Obesity, unspecified         E66.9           Active  073033168  

 

        Problem Feeling of incomplete bladder emptying         R39.14          A

ctive  937426970  

 

        Problem Mixed hyperlipidemia         E78.2           Active  239545188 T

reated by his primary 

provider with lovastatin. I cannot locate a recent lipid profile.

 

        Problem Post-traumatic stress disorder, unspecified         F43.10      

    Active  15407159  

 

        Problem Generalized anxiety disorder         F41.1           Active  218

94125  

 

          Problem   Major depressive disorder, recurrent, moderate           F33

.1               Active    

[id number]                                

 

        Problem Mental disorder, not otherwise specified         F99            

 Active  42137523  







ALLERGIES





                    Allergen (clinical drug ingredient) Drug/Non Drug Allergy do

cumented on EMR 

Reaction            Allergy Type        Onset Date          Status

 

                      Feathers   Unknown    Non Drug Allergy            Active

 

                      Effexor    Excessive Emotions Drug Allergy            Acti

ve

 

           escitalopram Lexapro(NDC Code:[address]-95) Fatigue    Drug Allergy 

           Active

 

                      Wellbutrin Aggressive Drug Allergy            Active







ENCOUNTERS from 1975 to 2020-12-10





             Encounter    Location     Date         Provider     Diagnosis

 

                [address] [address]     Mirta Garay                              Gastroesophageal reflux disease, esophag

itis presence not specified 

K21.9







IMMUNIZATIONS

No Information



SOCIAL HISTORY

Tobacco Use:



                    Social History Observation Description         Date

 

                    Details (start date - stop date) Current Smoker       



Sex Assigned At Birth:



                          Social History Observation Description

 

                          Sex Assigned At Birth     Unknown



Education:



                    Question            Answer              Notes

 

                    Level of Education: High School          



Audit



                    Question            Answer              Notes

 

                    Total Score:        1                    

 

                    Interpretation:     Alcohol Education    



Language:



                    Question            Answer              Notes

 

                    Languages spoken:   English              

 

                    Languages spoken:   English              



Protestant:



                    Question            Answer              Notes

 

                    Protestant                                No Voodoo beliefs

 that would impact health care.

 

                    Protestant            [address]    



Sexual Hx:



                    Question            Answer              Notes

 

                    Had sex in the last 12 months (vaginal, oral, or anal)? Yes 

                 

 

                    Have you ever had an STD? No                   

 

                    with                Women only           

 

                    Use protection?     No                   



Drug and Alcohol



                    Question            Answer              Notes

 

                    Total Score:        0                    

 

                    Interpretation:     No problems reported  



Alcohol Screening:



                    Question            Answer              Notes

 

                    Did you have a drink containing alcohol in the past year? Ye

s                  

 

                    Points              3                    

 

                    Interpretation      Negative             

 

                          How often did you have six or more drinks on one occas

ion in the past year? 

Never (0 points)                         

 

                                        How many drinks did you have on a typica

l day when you were drinking in the past

year?                     1 or 2 (0 points)          

 

                          How often did you have a drink containing alcohol in t

he past year? Two to three

times per week (3 points)                



Tobacco Use:



                    Question            Answer              Notes

 

                    Are you a:          Uses tobacco in other forms SMOKE PIPE A

BOUT 10 CIGARETTES A DAY

 

                    Are you a:          current smoker       

 

                    Are you interested in quitting? Ready to quit        

 

                    Counseled the patient on tobacco use, cessation provided 10/

           







REASON FOR REFERRAL

No Information



VITAL SIGNS

No information



MEDICATIONS





           Medication SIG (Take, Route, Frequency, Duration) Notes      Start Da

te End Date   

Status

 

           Breo Ellipta 200-25 MCG/INH 1 puff Inhalation Once a day             

                     Active

 

           HydrOXYzine HCl 10 MG 2 tabs Orally before bedtime for 30            

                      Active

 

                Acetaminophen 500 MG 1 capsule as needed Orally every 6 hrs for 

7 day(s)                 19 

Dec, 2019                                           Active

 

           Mirtazapine 7.5 MG tablets at bedtime Orally before bedtime for 30   

                               Active

 

           Nystatin 068203 UNIT/GM as directed Externally B)Feet Once a day     

                             Active

 

           Omeprazole 40 MG 1 capsule Orally Daily for 30                       

           Active

 

           FLUoxetine HCl 40 MG 1 capsule orally Daily for 90 day(s)            

                      Active

 

           FLUoxetine HCl 10 MG 1 capsule Orally Daily for 90 day(s)            

                      Active

 

                          Ventolin  (90 Base) MCG/ACT 1 puff as needed In

halation every 4 hours as 

needed                                                          Active

 

           Mucinex 600 MG 1 tab orally bid for 30 Days                          

        Active

 

             Clonidine HCl 0.2 MG 1 tablet Orally before bedtime for 30 Days    

          13 Dec, 2019  

                                        Active

 

           Lovastatin 20 MG 1 tab orally Daily for 30                           

       Active

 

                          Albuterol Sulfate (2.5 MG/3ML) 0.083% 1 vial Inhalatio

n every 6 hrs as needed 

for 30 days                                                     Active

 

           Incruse Ellipta 62.5 MCG/INH 1 puff Inhalation Once a day            

                      Active







PROCEDURES

No Information



RESULTS

No Results



REASON FOR VISIT

New Refill Request



MEDICAL (GENERAL) HISTORY





                    Type                Description         Date

 

                    Surgical History    R)knee surgery      

 

                    Surgical History    L)knee surgery      

 

                    Surgical History    s/p B Inguinal Hernia Repair- Dr. Foster

2006

 

                    Surgical History    Inguinal Hernia Repair 

 

                    Surgical History    s/p REctal polyps removed Dr. Szymanski 



 

                    Surgical History    LEFT ELBOW PINCHED NERVE 

 

                    Surgical History    R) carpal tunnel repair 19

 

                    Surgical History    Cystoscopy apparently with Botox instill

ation?-Dr. Perez 

2019

 

                    Surgical History    Left carpal tunnel repair 6/3/2020

 

                    Surgical History    R)shoulder surgery debridement 2020







Goals Section

No Information



Health Concerns

No Information



MEDICAL EQUIPMENT

No Information



MENTAL STATUS

No Information



FUNCTIONAL STATUS

No Information



ASSESSMENTS





             Encounter Date Diagnosis    Assessment Notes Treatment Notes Treatm

ent Clinical 

Notes

 

                                        Gastroesophageal reflux dise

ase, esophagitis presence not specified

(ICD-10 - K21.9)                                    



                                        











PLAN OF TREATMENT

Medication



                Medication Name Sig             Start Date      Stop Date

 

                Mirtazapine 7.5 MG tablets at bedtime Orally before bedtime for 

30                  

 

                Lovastatin 20 MG 1 tab orally Daily for 30                  

 

                FLUoxetine HCl 10 MG 1 capsule Orally Daily for 90 day(s)       

           

 

                HydrOXYzine HCl 10 MG 2 tabs Orally before bedtime for 30       

           

 

                Incruse Ellipta 62.5 MCG/INH 1 puff Inhalation Once a day       

           

 

                FLUoxetine HCl 40 MG 1 capsule orally Daily for 90 day(s)       

           

 

                Breo Ellipta 200-25 MCG/INH 1 puff Inhalation Once a day        

          

 

                          Ventolin  (90 Base) MCG/ACT 1 puff as needed In

halation every 4 hours as 

needed                                               

 

                Omeprazole 40 MG 1 capsule Orally Daily for 30                  



Next Appt



                                        Details

 

                                        Provider Name:Jose Garner, 2020 03:0

0:00 PM, [facility] [place], [address]







Insurance Providers





             Payer Name   Payer Address Payer Phone  Insured Name Patient Relati

onship to 

Insured                   Coverage Start Date       Coverage End Date

 

                [facility]         CORPORATE CLAIMS DEPT PO  [facility] [address]

[phone]    

AMAN SHNI        self

## 2021-01-27 NOTE — CCD
Continuity of Care Document (CCD)

                             Created on: 2020



Adrián Shin

External Reference #: [id number]

: 1975

Sex: Male



Demographics





                          Address                   [address] [place] [address] [address]

[place]  [address]

 

                          Home Phone                [phone]

 

                          Preferred Language        Unknown

 

                          Marital Status            Unknown

 

                          Voodoo Affiliation     Unknown

 

                          Race                      White

 

                          Ethnic Group              Not  or 





Author





                          Author                    Adrián WELSH MD

 

                          Organization              Unknown

 

                          Address                   [address] [place] [address], [facility] [address]

[place]  [address]



 

                          Phone                     [phone]







Care Team Providers





                    Care Team Member Name Role                Phone

 

                    Wendi Martinez MD AUTM                [phone]

 

                    Zachary Chakraborty MD      AUTM                [phone]







Problems





                    Active Problems     Provider            Date

 

                    Pure hypercholesterolemia Henry Trivedi MD  Onset: 10/07/2

019







Social History





                Type            Date            Description     Comments

 

                Birth Sex                       Unknown          

 

                ETOH Use                        Occasionally consumes alcohol  

 

                Tobacco Use     Start: Unknown  Patient is a current smoker, smo

kes every day smokes 

a pipe 







Allergies, Adverse Reactions, Alerts





             Active Allergies Reaction     Severity     Comments     Date

 

             Effexor                                             10/07/2019

 

             Wellbutrin                                          10/07/2019

 

             Lexapro                                             10/07/2019







Medications





           Active Medications SIG        Qnty       Indications Ordering Provide

r Date

 

                          Tramadol HCL                     50mg Tablets         

          1 tab po every 

4-6 hours as needed pain after surgery(please do not fill until 6/3/2020). 

10tabs                                  Henry Trivedi MD  2020

 

                          Ventolin HFA                     108(90Base) mcg/Act A

erosol                    

                                                Henry Trivedi MD 2019

 

             Breo Ellipta                     200-25mcg/Inh Aerosol             

                                             

Unknown                                 

 

                Spiriva Respimat                     1.25mcg/Act Aerosol        

                                            

                          Unknown                   

 

                          Fluoxetine HCL                     40mg Capsules      

             1 by mouth 

every day                                       Unknown         

 

           Omeprazole                     40mg Capsules DR                      

                              Unknown    



 

                                        Albuterol Sulfate HFA                   

  108(90Base) mcg/Act Aerosol           

                                                    Unknown      

 

           Trazodone HCL                     50mg Tablets                       

                             Unknown    



 

             Clonidine                     0.3mg/24HR Patches Weekly            

                                              

Unknown                                 

 

             Hydroxyzine HCL                     10mg Tablets                   

                                       Unknown

                                        

 

                    Lovastatin                     20mg Tablets                 

  1 by mouth a day     

                                        Unknown             







Immunizations





                                        Description

 

                                        No Information Available







Vital Signs





                Date            Vital           Result          Comment

 

                2019  2:32pm Body Temperature 97.7 F         

 

                2019 10:35am Body Temperature 97.7 F         

 

                    Height              65.5 inches         5'5.50"

 

                    Weight              188.00 lb            

 

                    BMI (Body Mass Index) 30.8 kg/m2           







Results





        Test    Acquired Date Facility Test    Result  H/L     Range   Note

 

                    Coronavirus 2019 Nasopharygeal 2020          [address] [address]

           (315)-   - Coronavirus 2019 Nasopharygeal Testing was perf <SEE NOTE>

                        1







                          1                         Testing was performed using 

the hollie(R) SARS-CoV-2 test.

This test was developed and its performance characteristics

determined by [employer]. This test has not been

FDA cleared or approved. This test has been authorized by

FDA under an Emergency Use Authorization (EUA). This test

is only authorized for the duration of time the declaration

that circumstances exist justifying the authorization of

the emergency use of in vitro diagnostic tests for

detection of SARS-CoV-2 virus and/or diagnosis of COVID-19

infection under section 564(b)(1) of the Act, 21 U.S.C.

                                        360bbb-3(b)(1), unless the authorization

 is terminated or

revoked sooner. When diagnostic testing is negative, the

possibility of a false negative result should be considered

in the context of a patient's recent exposures and the

presence of clinical signs and symptoms consistent with

COVID-19. An individual without symptoms of COVID-19 and

who is not shedding SARS-CoV-2 virus would expect to have a

negative (not detected) result in this assay.

Performed at:  [address]  [id number]

: Araceli B Reyes MD, Phone:  [phone]



Not Detected











Procedures





                Date            Code            Description     Status

 

                2020      87333           Therapeutic Procedure, Each 15 M

inutes Completed

 

                2020      64114           Therapeutic Procedure, Each 15 M

inutes Completed

 

                2020      22919           Hot Or Cold Packs Completed

 

                2020      61383           X-Ray Shoulder Complete Complete

d

 

                2020      53718           Therapeutic Procedure, Each 15 M

inutes Completed

 

                2020      18868           Hot Or Cold Packs Completed

 

                2020      30392           Therapeutic Procedure, Each 15 M

inutes Completed

 

                10/30/2020      96170           Therapeutic Procedure, Each 15 M

inutes Completed

 

                10/30/2020      77675           Hot Or Cold Packs Completed

 

                10/07/2020      27221           Therapeutic Procedure, Each 15 M

inutes Completed

 

                    10/07/2020          41003               Re-Eval Of PT Establ

ished Plan Of Care 20Mins Face To Face 

PT/Fam                                  Completed

 

                10/02/2020      24999           Therapeutic Procedure, Each 15 M

inutes Completed

 

                2020      96833           Therapeutic Procedure, Each 15 M

inutes Completed

 

                2020      53595           Therapeutic Procedure, Each 15 M

inutes Completed

 

                2020      65010           Hot Or Cold Packs Completed

 

                2020      93890           Therapeutic Procedure, Each 15 M

inutes Completed

 

                2020      70817           Hot Or Cold Packs Completed

 

                2020      63313           Hot Or Cold Packs Completed

 

                2020      95193           Therapeutic Procedure, Each 15 M

inutes Completed

 

                2020      18046           Therapeutic Procedure, Each 15 M

inutes Completed

 

                2020      81510           Therapeutic Procedure, Each 15 M

inutes Completed

 

                2020      47504           Hot Or Cold Packs Completed

 

                    2020          63472               Re-Eval Of PT Establ

ished Plan Of Care 20Mins Face To Face 

PT/Fam                                  Completed

 

                2020      34140           Therapeutic Procedure, Each 15 M

inutes Completed

 

                2020      57086           Therapeutic Procedure, Each 15 M

inutes Completed

 

                2020      48275           Hot Or Cold Packs Completed

 

                2020      34289           Hot Or Cold Packs Completed

 

                2020      72254           Therapeutic Procedure, Each 15 M

inutes Completed

 

                2020      00289           Therapeutic Procedure, Each 15 M

inutes Completed

 

                2020      90905           Hot Or Cold Packs Completed

 

                2020      03259           Physical Therapy Eval - Low Comp

lexity Completed

 

                2020      04989           Arthroscopy Shoulder Decompress 

Subacromial Part Acromioplasty 

Completed

 

                    2020          71101               Arthroscopy Shoulder

 Removal Loose/Foreign Body Dist 

Claviculecto                            Completed

 

                2020      52431           Physical Therapy Eval - Low Comp

lexity Completed

 

                2020      14844           Endoscopy Wrist W/Release Transv

erse Carpal Ligament Completed







Medical Devices





                                        Description

 

                                        No Information Available







Encounters





                                        Description

 

                                        No Information Available







Assessments





                Date            Code            Description     Provider

 

                2020      Z47.89          Encounter for other orthopedic a

ftercare Larssaskia Huerta PT, 

DPT

 

                2020      Z47.89          Encounter for other orthopedic a

ftercare Celena Alvarado, PTA

 

                2020      M25.512         Pain in left shoulder Sergo Welsh MD

 

                2020      Z47.89          Encounter for other orthopedic a

ftercare Celena Alvarado, PTA

 

                2020      Z47.89          Encounter for other orthopedic a

ftercare Stacey Scee P.T.A.

 

                10/30/2020      Z47.89          Encounter for other orthopedic a

ftercare Celena Alvarado, PTA

 

                10/07/2020      Z47.89          Encounter for other orthopedic a

ftercare Larssaskia Huerta PT, 

DPT

 

                10/02/2020      Z47.89          Encounter for other orthopedic a

ftercare Celena Alvarado, PTA

 

                2020      Z47.89          Encounter for other orthopedic a

ftercare Lars Huerta PT, 

DPT

 

                2020      Z47.89          Encounter for other orthopedic a

ftercare Stacey Scee P.T.A.

 

                2020      Z47.89          Encounter for other orthopedic a

ftercare Stacey Scee P.T.A.

 

                2020      Z47.89          Encounter for other orthopedic a

ftercare Stacey Scee P.T.A.

 

                2020      Z47.89          Encounter for other orthopedic a

ftercare Lars Huerta PT, 

DPT

 

                2020      Z47.89          Encounter for other orthopedic a

ftercare Stacey Scee P.T.A.

 

                2020      Z47.89          Encounter for other orthopedic a

ftercare Lars Huerta PT, 

DPT

 

                2020      Z47.89          Encounter for other orthopedic a

ftercare Stacey Scee P.T.A.

 

                2020      Z47.89          Encounter for other orthopedic a

ftercare Stacey Scee P.T.A.

 

                2020      Z47.89          Encounter for other orthopedic a

ftercare Celena Alvarado, PTA

 

                2020      Z47.89          Encounter for other orthopedic a

ftercemily Huerta, PT, 

DPT

 

                    2020          M75.112             Incomplete rotator c

uff tear or rupture of left shoulder, not

specified as traumatic                  Giancarlo Real PA-C

 

                2020      M75.42          Impingement syndrome of left taqueria

wilfredo Giancarlo Real, YAKOV

 

                2020      M19.012         Primary osteoarthritis, left taqueria

wilfredo Giancarlo Real, 

YAKOV

 

                2020      Z47.89          Encounter for other orthopedic a

ftrober Codi OLSON YAKOV Welsh

 

                2020      Z47.89          Encounter for other orthopedic a

ftrober Huerta, PT, 

DPT

 

                2020      G56.02          Carpal tunnel syndrome, left upp

er limb Henry Trivedi MD







Plan of Treatment

Future Appointment(s):* 2020  3:30 pm - Celena Alvarado PTA at Physical 
  Therapy Referral





Functional Status





                                        Description

 

                                        No Information Available







Mental Status





                                        Description

 

                                        No Information Available







Referrals





                Refer to      Reason for Referral Status          Appt Date

 

                          Henry Trivedi MD        PT - 8 VISITS GOOD FOR CLAIRE S

HLDRS FROM 10/19-20, AUTH 

#[id number], TRACKING #[id number]. SS  Created                   

 

                                        [address] [place] [address], [place] [address]

 

                                        [phone]

 

                                          

 

                          Henry Trivedi MD        [id number] 

DO NOT NEED AUTH, [id number], 

[id number], [id number] AUTH #[id number], SPOKE WITH MARIZA AND SHE WAS ABLE 
TO EXTEND THE AUTH FOR SURGERY FOR ME DATES ARE GOOD FROM 2020-2020.. 
SENT TO [facility] IN SURG SCHED..LD  Created                   

 

                                        [address] [place] [address], [address] [place] [address]

 

                                        [phone]

 

                                          

 

                          Henry Trivedi MD        PT - 13 VISITS FOR CLAIRE SHLDR

S FROM -10/9/20, 

#[id number], REF #[id number]. SS  Created                   

 

                                        [address] [place] [address], [place] [address]

 

                                        [phone]

 

                                          

 

                          Henry Trivedi MD        PT EVAL [id number], R

[facility] SHOULDER, ALLOWED 1 VISIT 

THEN PT DEPT CALLS TO GET STEPHANIE GURROLA PT DEPT..LD  Created                   



 

                                        [address] [facility], [address]

 

                                        [place] [id number] [phone]

## 2021-01-27 NOTE — CCD
Summarization of Episode Note

                             Created on: 2021



AMAN SHIN JULES

External Reference #: [id number]

: 1975

Sex: Male



Demographics





                          Address                   [address]

[place]  [address]

 

                          Home Phone                [phone]

 

                          Preferred Language        Unknown

 

                          Marital Status            Unknown

 

                          Mormonism Affiliation     Unknown

 

                          Race                      White

 

                          Ethnic Group              Not  or 





Author





                          Author                    [facility]

ems

 

                          Organization              [facility]

ems

 

                          Address                   Unknown

 

                          Phone                     Unavailable







Support





                Name            Relationship    Address         Phone

 

                    AMAN SHIN         GUAR                [address]

[place], [place]  [address] [phone]

 

                    Ana Shin          ECON                [address]

[place]  [address]                       [phone]







Care Team Providers





                    Care Team Member Name Role                Phone

 

                    Eliza Mcdermott      Unavailable         [phone]







PROBLEMS





          Type      Condition ICD9-CM Code QIM36-GS Code Onset Dates Condition S

tatus SNOMED 

Code                                    Notes

 

        Problem Neurogenic bladder         N31.9           Active  606039000  

 

        Problem Body mass index (BMI) 34.0-34.9, adult         Z68.34          A

ctive  212726146  

 

        Problem Urge incontinence         N39.41          Active  50871798  

 

        Problem Exercise-induced asthma         J45.990         Active  18502204

  

 

                Problem         Gastroesophageal reflux disease, esophagitis pre

sence not specified                 

K21.9                           Active          412133165       He is on omepraz

ole with control of his symptoms. He 

has seen a gastroenterologist in the past. No records available.

 

          Problem   Major depressive disorder, single episode, unspecified      

     F32.9               Active    

[id number]                                 

 

           Problem    Chronic obstructive pulmonary disease, unspecified COPD ty

pe            J44.9                 

Active                    64153228                  He apparently carries this d

iagnosis and is maintained on 

Spiriva and Breo. No active exacerbation. Unfortunately he is still smoking. No 
PFTs are readily available in the medical record. He has been advised to quit 
smoking.

 

        Problem Anxiety disorder, unspecified         F41.9           Active  23

7881458 He is on 

clonidine and high-dose fluoxetine. Symptoms are palliated.

 

        Problem Chronic kidney disease, stage 3 (moderate)         N18.3        

   Active  486679687 

Most recent lab work in 2019 was not remarkable.

 

        Problem Lumbar degenerative disc disease         M51.36          Active 

 30163255  

 

        Problem Insomnia due to other mental disorder         F51.05          Ac

tive  26849402  

 

        Problem Obesity, unspecified         E66.9           Active  935411672  

 

        Problem Feeling of incomplete bladder emptying         R39.14          A

ctive  354274329  

 

        Problem Mixed hyperlipidemia         E78.2           Active  848533922 T

reated by his primary 

provider with lovastatin. I cannot locate a recent lipid profile.

 

        Problem Post-traumatic stress disorder, unspecified         F43.10      

    Active  09665061  

 

        Problem Generalized anxiety disorder         F41.1           Active  218

[address]70  

 

          Problem   Major depressive disorder, recurrent, moderate           F33

.1               Active    

503935852                                

 

        Problem Mental disorder, not otherwise specified         F99            

 Active  16600559  







ALLERGIES





                    Allergen (clinical drug ingredient) Drug/Non Drug Allergy do

cumented on EMR 

Reaction            Allergy Type        Onset Date          Status

 

                      Feathers   Unknown    Non Drug Allergy            Active

 

                      Effexor    Excessive Emotions Drug Allergy            Acti

ve

 

           escitalopram Lexapro(NDC Code:[address]-80) Fatigue    Drug Allergy 

           Active

 

                      Wellbutrin Aggressive Drug Allergy            Active







ENCOUNTERS from 1975 to 2021





             Encounter    Location     Date         Provider     Diagnosis

 

                [address] [address]     Eliza Mcdermott                                  







IMMUNIZATIONS

No Information



SOCIAL HISTORY

Tobacco Use:



                    Social History Observation Description         Date

 

                    Details (start date - stop date) Current Smoker       



Sex Assigned At Birth:



                          Social History Observation Description

 

                          Sex Assigned At Birth     Unknown



Education:



                    Question            Answer              Notes

 

                    Level of Education: High School          



Audit



                    Question            Answer              Notes

 

                    Total Score:        1                    

 

                    Interpretation:     Alcohol Education    



Language:



                    Question            Answer              Notes

 

                    Languages spoken:   English              

 

                    Languages spoken:   English              



Sikhism:



                    Question            Answer              Notes

 

                    Sikhism                                No Gnosticist beliefs

 that would impact health care.

 

                    Sikhism            [employer]    



Sexual Hx:



                    Question            Answer              Notes

 

                    Had sex in the last 12 months (vaginal, oral, or anal)? Yes 

                 

 

                    Have you ever had an STD? No                   

 

                    with                Women only           

 

                    Use protection?     No                   



Drug and Alcohol



                    Question            Answer              Notes

 

                    Total Score:        0                    

 

                    Interpretation:     No problems reported  



Alcohol Screening:



                    Question            Answer              Notes

 

                    Did you have a drink containing alcohol in the past year? Ye

s                  

 

                    Points              3                    

 

                    Interpretation      Negative             

 

                          How often did you have six or more drinks on one occas

ion in the past year? 

Never (0 points)                         

 

                                        How many drinks did you have on a typica

l day when you were drinking in the past

year?                     1 or 2 (0 points)          

 

                          How often did you have a drink containing alcohol in t

he past year? Two to three

times per week (3 points)                



Tobacco Use:



                    Question            Answer              Notes

 

                    Are you a:          Uses tobacco in other forms SMOKE PIPE A

BOUT 10 CIGARETTES A DAY

 

                    Are you a:          current smoker       

 

                    Are you interested in quitting? Ready to quit        

 

                    Counseled the patient on tobacco use, cessation provided 10/

           







REASON FOR REFERRAL

No Information



VITAL SIGNS

No information



MEDICATIONS





           Medication SIG (Take, Route, Frequency, Duration) Notes      Start Da

te End Date   

Status

 

           Mucinex 600 MG 1 tab orally bid                                  Acti

ve

 

           FLUoxetine HCl 10 MG 1 capsule Orally Daily                          

        Active

 

           FLUoxetine HCl 40 MG 1 capsule orally Daily                          

        Active

 

           Clonidine HCl 0.2 MG 1 tablet Orally before bedtime            13 Dec

, 2019            Active

 

           Mirtazapine 7.5 MG tablets at bedtime Orally before bedtime          

                        Active

 

                          Ventolin  (90 Base) MCG/ACT 1 puff as needed In

halation every 4 hours as 

needed                                                          Active

 

           Incruse Ellipta 62.5 MCG/INH 1 puff Inhalation Once a day            

                      Active

 

           Omeprazole 40 MG 1 capsule Orally Daily                              

    Active

 

           HydrOXYzine HCl 10 MG 2 tabs Orally before bedtime                   

               Active

 

           Nystatin 737112 UNIT/GM as directed Externally B)Feet Once a day     

                             Active

 

           FLUoxetine HCl 40 MG 1 capsule Orally Once a day for 90 days         

               

Not-Taking

 

                Albuterol Sulfate (2.5 MG/3ML) 0.083% 1 vial Inhalation every 6 

hrs as needed                 

                                                    Active

 

           Breo Ellipta 200-25 MCG/INH 1 puff Inhalation Once a day             

                     Active

 

           Lovastatin 20 MG 1 tab orally Daily                                  

Active

 

           Acetaminophen 500 MG 1 capsule as needed Orally every 6 hrs          

  19 Dec, 2019            

Active







PROCEDURES

No Information



RESULTS

No Results



REASON FOR VISIT

looking for EKG for preop 



MEDICAL (GENERAL) HISTORY





                    Type                Description         Date

 

                    Surgical History    R)knee surgery      

 

                    Surgical History    L)knee surgery      

 

                    Surgical History    s/p B Inguinal Hernia Repair- Dr. Foster

2006

 

                    Surgical History    Inguinal Hernia Repair 

 

                    Surgical History    s/p REctal polyps removed Dr. Szymanski 



 

                    Surgical History    LEFT ELBOW PINCHED NERVE 

 

                    Surgical History    R) carpal tunnel repair 19

 

                    Surgical History    Cystoscopy apparently with Botox instill

ation?-Dr. Perez 

2019

 

                    Surgical History    Left carpal tunnel repair 6/3/2020

 

                    Surgical History    R)shoulder surgery debridement 2020







Goals Section

No Information



Health Concerns

No Information



MEDICAL EQUIPMENT

No Information



MENTAL STATUS

No Information



FUNCTIONAL STATUS

No Information



ASSESSMENTS

No Information



PLAN OF TREATMENT

Medication



                Medication Name Sig             Start Date      Stop Date

 

                          Ventolin  (90 Base) MCG/ACT 1 puff as needed In

halation every 4 hours as 

needed                                               

 

                Breo Ellipta 200-25 MCG/INH 1 puff Inhalation Once a day        

          

 

                Mirtazapine 7.5 MG tablets at bedtime Orally before bedtime     

             

 

                Albuterol Sulfate (2.5 MG/3ML) 0.083% 1 vial Inhalation every 6 

hrs as needed                 



 

                Clonidine HCl 0.2 MG 1 tablet Orally before bedtime 13 Dec, 2019

     

 

                Acetaminophen 500 MG 1 capsule as needed Orally every 6 hrs 19 D

2019     

 

                Incruse Ellipta 62.5 MCG/INH 1 puff Inhalation Once a day       

           

 

                Omeprazole 40 MG 1 capsule Orally Daily                  

 

                HydrOXYzine HCl 10 MG 2 tabs Orally before bedtime              

    

 

                FLUoxetine HCl 40 MG 1 capsule orally Daily                  

 

                FLUoxetine HCl 10 MG 1 capsule Orally Daily                  

 

                Lovastatin 20 MG 1 tab orally Daily                  



Next Appt



                                        Details

 

                                        Provider Name:Jose Garner, 2021 10:0

0:00 AM, [address] [place] [address], [phone]

 

                                        Provider Name:Mirta Gaary,  10:45:00 AM, [address] [place] [address], [phone], [phone]







Insurance Providers





             Payer Name   Payer Address Payer Phone  Insured Name Patient Relati

onship to 

Insured                   Coverage Start Date       Coverage End Date

 

                [facility]         CORPORATE CLAIMS DEPT   [facility] [address]

[phone]    

AMAN SHIN        self

## 2021-01-27 NOTE — CCD
Continuity of Care Document (CCD)

                             Created on: 2020



Adrián Shin

External Reference #: [id number]

: 1975

Sex: Male



Demographics





                          Address                   [address] [place] [address] [address]

[place]  [address]

 

                          Home Phone                [phone]

 

                          Preferred Language        Unknown

 

                          Marital Status            Unknown

 

                          Roman Catholic Affiliation     Unknown

 

                          Race                      White

 

                          Ethnic Group              Not  or 





Author





                          Author                    Adrián WELSH MD

 

                          Organization              Unknown

 

                          Address                   [address] [place] [address], [facility] [address]

[place]  [address]



 

                          Phone                     [phone]







Care Team Providers





                    Care Team Member Name Role                Phone

 

                    Wendi Martinez MD AUTM                [phone]

 

                    Zachary Chakraborty MD      AUTM                [phone]







Problems





                    Active Problems     Provider            Date

 

                    Pure hypercholesterolemia Henry Trivedi MD  Onset: 10/07/2

019







Social History





                Type            Date            Description     Comments

 

                Birth Sex                       Unknown          

 

                ETOH Use                        Occasionally consumes alcohol  

 

                Tobacco Use     Start: Unknown  Patient is a current smoker, smo

kes every day smokes 

a pipe 







Allergies, Adverse Reactions, Alerts





             Active Allergies Reaction     Severity     Comments     Date

 

             Effexor                                             10/07/2019

 

             Wellbutrin                                          10/07/2019

 

             Lexapro                                             10/07/2019







Medications





           Active Medications SIG        Qnty       Indications Ordering Provide

r Date

 

                          Tramadol HCL                     50mg Tablets         

          1 tab po every 

4-6 hours as needed pain after surgery(please do not fill until 6/3/2020). 

10tabs                                  Henry Trivedi MD  2020

 

                          Ventolin HFA                     108(90Base) mcg/Act A

erosol                    

                                                Henry Trivedi MD 2019

 

             Breo Ellipta                     200-25mcg/Inh Aerosol             

                                             

Unknown                                 

 

                Spiriva Respimat                     1.25mcg/Act Aerosol        

                                            

                          Unknown                   

 

                          Fluoxetine HCL                     40mg Capsules      

             1 by mouth 

every day                                       Unknown         

 

           Omeprazole                     40mg Capsules DR                      

                              Unknown    



 

                                        Albuterol Sulfate HFA                   

  108(90Base) mcg/Act Aerosol           

                                                    Unknown      

 

           Trazodone HCL                     50mg Tablets                       

                             Unknown    



 

             Clonidine                     0.3mg/24HR Patches Weekly            

                                              

Unknown                                 

 

             Hydroxyzine HCL                     10mg Tablets                   

                                       Unknown

                                        

 

                    Lovastatin                     20mg Tablets                 

  1 by mouth a day     

                                        Unknown             







Immunizations





                                        Description

 

                                        No Information Available







Vital Signs





                Date            Vital           Result          Comment

 

                2019  2:32pm Body Temperature 97.7 F         

 

                2019 10:35am Body Temperature 97.7 F         

 

                    Height              65.5 inches         5'5.50"

 

                    Weight              188.00 lb            

 

                    BMI (Body Mass Index) 30.8 kg/m2           







Results





        Test    Acquired Date Facility Test    Result  H/L     Range   Note

 

                    Coronavirus 2019 Nasopharygeal 2020          [address] [address]

           (315)-   - Coronavirus 2019 Nasopharygeal Testing was perf <SEE NOTE>

                        1







                          1                         Testing was performed using 

the hollie(R) SARS-CoV-2 test.

This test was developed and its performance characteristics

determined by [employer]. This test has not been

FDA cleared or approved. This test has been authorized by

FDA under an Emergency Use Authorization (EUA). This test

is only authorized for the duration of time the declaration

that circumstances exist justifying the authorization of

the emergency use of in vitro diagnostic tests for

detection of SARS-CoV-2 virus and/or diagnosis of COVID-19

infection under section 564(b)(1) of the Act, 21 U.S.C.

                                        360bbb-3(b)(1), unless the authorization

 is terminated or

revoked sooner. When diagnostic testing is negative, the

possibility of a false negative result should be considered

in the context of a patient's recent exposures and the

presence of clinical signs and symptoms consistent with

COVID-19. An individual without symptoms of COVID-19 and

who is not shedding SARS-CoV-2 virus would expect to have a

negative (not detected) result in this assay.

Performed at:  [address]  [id number]

: Araceli B Reyes MD, Phone:  [phone]



Not Detected











Procedures





                Date            Code            Description     Status

 

                2020      10514           X-Ray Shoulder Complete Complete

d

 

                2020      38215           Therapeutic Procedure, Each 15 M

inutes Completed

 

                2020      11249           Hot Or Cold Packs Completed

 

                2020      63726           Therapeutic Procedure, Each 15 M

inutes Completed

 

                10/30/2020      15519           Therapeutic Procedure, Each 15 M

inutes Completed

 

                10/30/2020      31769           Hot Or Cold Packs Completed

 

                    10/07/2020          31535               Re-Eval Of PT Establ

ished Plan Of Care 20Mins Face To Face 

PT/Fam                                  Completed

 

                10/07/2020      63989           Therapeutic Procedure, Each 15 M

inutes Completed

 

                10/02/2020      47434           Therapeutic Procedure, Each 15 M

inutes Completed

 

                2020      74127           Therapeutic Procedure, Each 15 M

inutes Completed

 

                2020      66906           Therapeutic Procedure, Each 15 M

inutes Completed

 

                2020      37212           Hot Or Cold Packs Completed

 

                2020      36658           Therapeutic Procedure, Each 15 M

inutes Completed

 

                2020      77307           Hot Or Cold Packs Completed

 

                2020      31858           Therapeutic Procedure, Each 15 M

inutes Completed

 

                2020      43526           Hot Or Cold Packs Completed

 

                2020      91231           Therapeutic Procedure, Each 15 M

inutes Completed

 

                2020      00464           Therapeutic Procedure, Each 15 M

inutes Completed

 

                2020      22029           Hot Or Cold Packs Completed

 

                    2020          19142               Re-Eval Of PT Establ

ished Plan Of Care 20Mins Face To Face 

PT/Fam                                  Completed

 

                2020      56879           Therapeutic Procedure, Each 15 M

inutes Completed

 

                2020      72941           Therapeutic Procedure, Each 15 M

inutes Completed

 

                2020      68313           Hot Or Cold Packs Completed

 

                2020      39910           Therapeutic Procedure, Each 15 M

inutes Completed

 

                2020      29295           Hot Or Cold Packs Completed

 

                2020      88570           Therapeutic Procedure, Each 15 M

inutes Completed

 

                2020      62001           Hot Or Cold Packs Completed

 

                2020      76805           Physical Therapy Eval - Low Comp

lexity Completed

 

                2020      29402           Arthroscopy Shoulder Decompress 

Subacromial Part Acromioplasty 

Completed

 

                    2020          09081               Arthroscopy Shoulder

 Removal Loose/Foreign Body Dist 

Claviculecto                            Completed

 

                2020      28673           Physical Therapy Eval - Low Comp

lexity Completed

 

                2020      72911           Endoscopy Wrist W/Release Transv

erse Carpal Ligament Completed







Medical Devices





                                        Description

 

                                        No Information Available







Encounters





                                        Description

 

                                        No Information Available







Assessments





                Date            Code            Description     Provider

 

                2020      Z47.89          Encounter for other orthopedic a

ftercare Celena Alvarado PTA

 

                2020      Z47.89          Encounter for other orthopedic a

ftercare Stacey Scee P.T.A.

 

                10/30/2020      Z47.89          Encounter for other orthopedic a

ftercare Celena Alvarado, PTA

 

                10/07/2020      Z47.89          Encounter for other orthopedic a

ftercare Lars RIVERAKirsten Huerta, PT, 

DPT

 

                10/02/2020      Z47.89          Encounter for other orthopedic a

ftercare Celena Alvarado, PTA

 

                2020      Z47.89          Encounter for other orthopedic a

ftercare Lars MIGUELKirsten Huerta, PT, 

DPT

 

                2020      Z47.89          Encounter for other orthopedic a

ftercare Stacey Scee P.T.A.

 

                2020      Z47.89          Encounter for other orthopedic a

ftercare Stacey Scee P.T.A.

 

                2020      Z47.89          Encounter for other orthopedic a

ftercare Stacey Scee P.T.A.

 

                2020      Z47.89          Encounter for other orthopedic a

ftercare Lars Huerta, PT, 

DPT

 

                2020      Z47.89          Encounter for other orthopedic a

ftercare Stacey Scee P.T.A.

 

                2020      Z47.89          Encounter for other orthopedic a

ftercare Larssaskia Huerta, PT, 

DPT

 

                2020      Z47.89          Encounter for other orthopedic a

ftercare Stacey Scee P.T.A.

 

                2020      Z47.89          Encounter for other orthopedic a

ftercare Stacey Scee P.T.A.

 

                2020      Z47.89          Encounter for other orthopedic a

ftercare Celena Alvarado, PTA

 

                2020      Z47.89          Encounter for other orthopedic a

ftercare Lars Huerta, PT, 

DPT

 

                    2020          M75.112             Incomplete rotator c

uff tear or rupture of left shoulder, not

specified as traumatic                  Giancarlo Real PA-C

 

                2020      M75.42          Impingement syndrome of left taqueria

wilfredo Real PA-C

 

                2020      M19.012         Primary osteoarthritis, left taqueria

wilfredo Real PA-C

 

                2020      Z47.89          Encounter for other orthopedic a

ftercare Codi Welsh PA-C

 

                2020      Z47.89          Encounter for other orthopedic a

ftercare Lars Huerta, PT, 

DPT

 

                2020      G56.02          Carpal tunnel syndrome, left upp

er limb Henry Trivedi MD







Plan of Treatment

Future Appointment(s):* 2020  4:00 pm - Lars Huerta, PT, DPT at 
  Physical Therapy Referral





Functional Status





                                        Description

 

                                        No Information Available







Mental Status





                                        Description

 

                                        No Information Available







Referrals





                Refer to Dr     Reason for Referral Status          Appt Date

 

                          Henry Trivedi MD        PT - 8 VISITS GOOD FOR CLAIRE S

[facility]DRS FROM 10/19-20, AUTH 

#[id number], TRACKING #[id number]. SS  Created                   

 

                                        [address] [place] [address]

 

                                        [phone]

 

                                          

 

                          Henry Trivedi MD        [id number], [id number] 

DO NOT NEED AUTH, 2982, 

[id number], [id number] AUTH #[id number], SPOKE WITH MARIZA AND SHE WAS ABLE 
TO EXTEND THE AUTH FOR SURGERY FOR ME DATES ARE GOOD FROM 2020-2020.. 
SENT TO [facility] IN SURG SCHED..LD  Created                   

 

                                        [address] [place] [address]

 

                                        [phone]

 

                                          

 

                          Henry Trivedi MD        PT - 13 VISITS FOR CLAIRE SHLDR

S FROM -10/9/20, 

#[id number], REF #[id number]. SS  Created                   

 

                                        [address] [place] [address]

 

                                        [phone]

 

                                          

 

                          Henry Trivedi MD        PT EVAL [id number], R

IGTH SHOULDER, ALLOWED 1 VISIT 

THEN PT DEPT CALLS TO GET [facility] PT DEPT..LD  Created                   

 

                                        [address] [place] [address]

 

                                        [phone]

## 2021-04-16 ENCOUNTER — HOSPITAL ENCOUNTER (OUTPATIENT)
Dept: HOSPITAL 53 - M LAB REF | Age: 46
End: 2021-04-16
Attending: OBSTETRICS & GYNECOLOGY
Payer: COMMERCIAL

## 2021-04-16 DIAGNOSIS — N46.9: Primary | ICD-10-CM

## 2021-04-16 LAB
COLOR SMN: (no result)
SPECIMEN VOL SMN: 2.6 ML (ref 2–5)
SPERM # SMN: 13.4 M/ML (ref 15–?)
VISC SMN QL: (no result)
WBC # SMN AUTO: (no result) 10*3/UL

## 2021-06-02 ENCOUNTER — HOSPITAL ENCOUNTER (OUTPATIENT)
Dept: HOSPITAL 53 - M PLARAD | Age: 46
End: 2021-06-02
Attending: ORTHOPAEDIC SURGERY
Payer: COMMERCIAL

## 2021-06-02 DIAGNOSIS — M47.896: Primary | ICD-10-CM

## 2021-06-02 NOTE — REPVR
PROCEDURE INFORMATION: 

Exam: MR Lumbar Spine Without Contrast 

Exam date and time: 6/2/2021 10:00 AM 

Age: 46 years old 

Clinical indication: Low back pain; Additional info: Oth spondylosis R/O spinal 

stenosis 



TECHNIQUE: 

Imaging protocol: Multiplanar magnetic resonance images of the lumbar spine 

without intravenous contrast. 



COMPARISON: 

No relevant prior studies available. 



FINDINGS: 

Vertebrae:  There is 2 mm of grade 1 anterolisthesis of L4 with respect to L5.  

Normal vertebral body alignment is otherwise preserved.  Vertebral body heights 

and marrow signal are within normal limits.

Spinal cord: Normal signal. No cord compression. 

L1-L2:  There is shallow disc bulging.  There is mild facet hypertrophy.  The 

spinal canal and neural foramina are patent. 

L2-L3:  There is diffuse disc bulging.  There is mild facet and ligamentous 

hypertrophy.  There is mild-to-moderate bilateral neural foraminal narrowing.

L3-L4:  There is diffuse disc bulging.  There is mild-to-moderate facet and 

ligamentous hypertrophy.  There is mild canal stenosis.  There is mild right 

and moderate left neural foraminal narrowing.

L4-L5:  There is diffuse disc bulging/uncovering related to listhesis.  There 

is moderate facet and ligamentous hypertrophy.  There is mild right and 

mild-to-moderate left neural foraminal narrowing.  

L5-S1:  There is diffuse disc bulging with a superimposed right 

subarticular/foraminal disc protrusion and annular tear.  This narrows the 

right lateral recess.  There is mild facet hypertrophy.  There is moderate 

right and mild left neural foraminal narrowing.  

Soft tissues: Unremarkable. 



IMPRESSION: 

Degenerative disc disease and spondylosis.  At L5/S1, right 

subarticular/foraminal protrusion narrows the right lateral recess and comes 

into close contact with the right S1 nerve root.  There is moderate right 

neural foraminal narrowing.



Electronically signed by: Latha Moseley On 06/02/2021  10:29:01 AM

## 2021-10-19 ENCOUNTER — HOSPITAL ENCOUNTER (OUTPATIENT)
Dept: HOSPITAL 53 - M RAD | Age: 46
End: 2021-10-19
Attending: INTERNAL MEDICINE
Payer: COMMERCIAL

## 2021-10-19 DIAGNOSIS — J44.9: Primary | ICD-10-CM

## 2021-10-19 NOTE — REP
INDICATION:

COPD



COMPARISON:

04/27/2020



TECHNIQUE:

PA and lateral.



FINDINGS:

The mediastinum and cardiac silhouette are normal.  The lung fields demonstrate

chronic appearing changes consistent with the given history of COPD.  No acute

consolidation, effusion, or pneumothorax.  The skeletal structures are intact and

normal.



IMPRESSION:

Chronic appearing changes consistent with COPD.  No acute cardiopulmonary process.





<Electronically signed by Arnaldo Aguilera > 10/19/21 [id number]

## 2021-10-29 ENCOUNTER — HOSPITAL ENCOUNTER (OUTPATIENT)
Dept: HOSPITAL 53 - M RAD | Age: 46
End: 2021-10-29
Attending: ORTHOPAEDIC SURGERY
Payer: COMMERCIAL

## 2021-10-29 DIAGNOSIS — M16.12: Primary | ICD-10-CM

## 2021-10-29 LAB
ALBUMIN SERPL BCG-MCNC: 3.7 GM/DL (ref 3.2–5.2)
ALT SERPL W P-5'-P-CCNC: 38 U/L (ref 12–78)
BILIRUB SERPL-MCNC: 0.5 MG/DL (ref 0.2–1)
BUN SERPL-MCNC: 14 MG/DL (ref 7–18)
CALCIUM SERPL-MCNC: 9.2 MG/DL (ref 8.5–10.1)
CHLORIDE SERPL-SCNC: 108 MEQ/L (ref 98–107)
CO2 SERPL-SCNC: 30 MEQ/L (ref 21–32)
CREAT SERPL-MCNC: 1.11 MG/DL (ref 0.7–1.3)
ERYTHROCYTE [SEDIMENTATION RATE] IN BLOOD BY WESTERGREN METHOD: 8 MM/HR (ref 0–15)
GFR SERPL CREATININE-BSD FRML MDRD: > 60 ML/MIN/{1.73_M2} (ref 60–?)
GLUCOSE SERPL-MCNC: 88 MG/DL (ref 70–100)
HCT VFR BLD AUTO: 42.9 % (ref 42–52)
HGB BLD-MCNC: 13.7 G/DL (ref 13.5–17.5)
INR PPP: 1.04
MCH RBC QN AUTO: 29.8 PG (ref 27–33)
MCHC RBC AUTO-ENTMCNC: 31.9 G/DL (ref 32–36.5)
MCV RBC AUTO: 93.5 FL (ref 80–96)
PLATELET # BLD AUTO: 309 10^3/UL (ref 150–450)
POTASSIUM SERPL-SCNC: 4.8 MEQ/L (ref 3.5–5.1)
PROT SERPL-MCNC: 7 GM/DL (ref 6.4–8.2)
PROTHROMBIN TIME: 14 SECONDS (ref 12.7–14.5)
RBC # BLD AUTO: 4.59 10^6/UL (ref 4.3–6.1)
SODIUM SERPL-SCNC: 138 MEQ/L (ref 136–145)
WBC # BLD AUTO: 7.9 10^3/UL (ref 4–10)

## 2021-10-29 NOTE — REP
INDICATION:

LEFT HIP OSTEOARTHRITIS-EKG AND LABS AFTER.



COMPARISON:

10/19/2021



TECHNIQUE:

PA and lateral



FINDINGS:

There is lung field hyperexpansion status quo.  The cardiomediastinal silhouette is

stable.  The heart is not enlarged.  Osseous structures are stable and intact.





IMPRESSION:

There is no acute cardiopulmonary disease.  Stable appearing chronic changes





<Electronically signed by Ganesh Dean > 10/29/21 [id number]

## 2021-10-30 NOTE — ECGEPIP
[facility]

                                       

                                       Test Date:    2021-10-29

Pat Name:     AMAN MENDOZA               Department:   

Patient ID:   [id number]                 Room:         -

Gender:       Male                     Technician:   julia

:          1975               Requested By: Henry VICENTE

Order Number: [id number]     Reading MD:   Adolfo Hendricks

                                 Measurements

Intervals                              Axis          

Rate:         68                       P:            270

AK:           150                      QRS:          56

QRSD:         76                       T:            61

QT:           390                                    

QTc:          414                                    

                           Interpretive Statements

*** Poor data quality, interpretation may be adversely affected

Unusual P axis, possible ectopic atrial rhythm

Compared to prior tracings(2) in the system, normal sinus rhythm and sinus b

bradycardia were noted

Electronically Signed on 10- 18:26:57 EDT by Adolfo Hendricks

## 2021-11-19 ENCOUNTER — HOSPITAL ENCOUNTER (OUTPATIENT)
Dept: HOSPITAL 53 - M LAB | Age: 46
End: 2021-11-19
Attending: ORTHOPAEDIC SURGERY
Payer: COMMERCIAL

## 2021-11-19 DIAGNOSIS — M16.12: ICD-10-CM

## 2021-11-19 DIAGNOSIS — Z01.818: Primary | ICD-10-CM

## 2021-11-19 LAB
ALBUMIN SERPL BCG-MCNC: 3.7 GM/DL (ref 3.2–5.2)
ALT SERPL W P-5'-P-CCNC: 39 U/L (ref 12–78)
BILIRUB SERPL-MCNC: 0.3 MG/DL (ref 0.2–1)
BUN SERPL-MCNC: 14 MG/DL (ref 7–18)
CALCIUM SERPL-MCNC: 9.3 MG/DL (ref 8.5–10.1)
CHLORIDE SERPL-SCNC: 110 MEQ/L (ref 98–107)
CO2 SERPL-SCNC: 25 MEQ/L (ref 21–32)
CREAT SERPL-MCNC: 0.99 MG/DL (ref 0.7–1.3)
ERYTHROCYTE [SEDIMENTATION RATE] IN BLOOD BY WESTERGREN METHOD: 9 MM/HR (ref 0–15)
GFR SERPL CREATININE-BSD FRML MDRD: > 60 ML/MIN/{1.73_M2} (ref 60–?)
GLUCOSE SERPL-MCNC: 74 MG/DL (ref 70–100)
HCT VFR BLD AUTO: 43.1 % (ref 42–52)
HGB BLD-MCNC: 13.9 G/DL (ref 13.5–17.5)
INR PPP: 0.95
MCH RBC QN AUTO: 30.5 PG (ref 27–33)
MCHC RBC AUTO-ENTMCNC: 32.3 G/DL (ref 32–36.5)
MCV RBC AUTO: 94.7 FL (ref 80–96)
PLATELET # BLD AUTO: 294 10^3/UL (ref 150–450)
POTASSIUM SERPL-SCNC: 4.5 MEQ/L (ref 3.5–5.1)
PROT SERPL-MCNC: 6.9 GM/DL (ref 6.4–8.2)
PROTHROMBIN TIME: 13 SECONDS (ref 12.7–14.5)
RBC # BLD AUTO: 4.55 10^6/UL (ref 4.3–6.1)
SODIUM SERPL-SCNC: 142 MEQ/L (ref 136–145)
WBC # BLD AUTO: 8.6 10^3/UL (ref 4–10)

## 2021-11-19 NOTE — REP
INDICATION:

LEFT HIP OSTEOARTHRITIS/PRE OP/ LABS



COMPARISON:

10/29/2021



TECHNIQUE:

PA and lateral.



FINDINGS:

The mediastinum and cardiac silhouette are normal.  The lung fields are clear and

without acute consolidation, effusion, or pneumothorax.  The skeletal structures are

intact and normal.



IMPRESSION:

No acute cardiopulmonary process.





<Electronically signed by Arnaldo Aguilera > 11/19/21 [id number]

## 2021-11-20 NOTE — ECGEPIP
[facility]

                                       

                                       Test Date:    2021

Pat Name:     AMAN MENDOZA               Department:   

Patient ID:   [id number]                 Room:         -

Gender:       Male                     Technician:   JUNE

:          1975               Requested By: Henry VICENTE

Order Number: [id number]     Reading MD:   Usha Thapa

                                 Measurements

Intervals                              Axis          

Rate:         63                       P:            

TX:                                    QRS:          52

QRSD:         80                       T:            58

QT:           400                                    

QTc:          409                                    

                           Interpretive Statements

Sinus rhythm alternating with ectopic atrial rhythm 

Otherwise normal EKG

No change since 10/29/21

Electronically Signed on 2021 19:39:22 EST by Usha Thapa

## 2022-04-18 ENCOUNTER — HOSPITAL ENCOUNTER (OUTPATIENT)
Dept: HOSPITAL 53 - M SFHCPLAZ | Age: 47
End: 2022-04-18
Attending: PHYSICIAN ASSISTANT
Payer: COMMERCIAL

## 2022-04-18 ENCOUNTER — HOSPITAL ENCOUNTER (OUTPATIENT)
Dept: HOSPITAL 53 - M LAB | Age: 47
End: 2022-04-18
Attending: PHYSICIAN ASSISTANT
Payer: COMMERCIAL

## 2022-04-18 DIAGNOSIS — J34.1: Primary | ICD-10-CM

## 2022-04-18 DIAGNOSIS — R19.7: Primary | ICD-10-CM

## 2022-04-18 DIAGNOSIS — R19.7: ICD-10-CM

## 2022-04-18 LAB
FERRITIN SERPL-MCNC: 58 NG/ML (ref 26–388)
IRON SATN MFR SERPL: 24 % (ref 19.7–50)
IRON SERPL-MCNC: 67 UG/DL (ref 65–175)
TIBC SERPL-MCNC: 279 UG/DL (ref 250–450)

## 2022-08-22 ENCOUNTER — HOSPITAL ENCOUNTER (OUTPATIENT)
Dept: HOSPITAL 53 - M PLALAB | Age: 47
End: 2022-08-22
Attending: PHYSICIAN ASSISTANT
Payer: COMMERCIAL

## 2022-08-22 DIAGNOSIS — K52.9: Primary | ICD-10-CM

## 2022-08-22 DIAGNOSIS — A04.5: ICD-10-CM

## 2022-08-22 LAB
25(OH)D3 SERPL-MCNC: 94 NG/ML (ref 30–100)
BASOPHILS # BLD AUTO: 0.1 10^3/UL (ref 0–0.2)
BASOPHILS NFR BLD AUTO: 0.7 % (ref 0–1)
BUN SERPL-MCNC: 14 MG/DL (ref 7–18)
CALCIUM SERPL-MCNC: 9.1 MG/DL (ref 8.5–10.1)
CHLORIDE SERPL-SCNC: 109 MEQ/L (ref 98–107)
CO2 SERPL-SCNC: 26 MEQ/L (ref 21–32)
CREAT SERPL-MCNC: 0.99 MG/DL (ref 0.7–1.3)
EOSINOPHIL # BLD AUTO: 0.2 10^3/UL (ref 0–0.5)
EOSINOPHIL NFR BLD AUTO: 1.6 % (ref 0–3)
GFR SERPL CREATININE-BSD FRML MDRD: > 60 ML/MIN/{1.73_M2} (ref 60–?)
GLUCOSE SERPL-MCNC: 93 MG/DL (ref 70–100)
HCT VFR BLD AUTO: 38.1 % (ref 42–52)
HGB BLD-MCNC: 12.1 G/DL (ref 13.5–17.5)
IRON SATN MFR SERPL: 11.4 % (ref 19.7–50)
IRON SERPL-MCNC: 36 UG/DL (ref 65–175)
LYMPHOCYTES # BLD AUTO: 3 10^3/UL (ref 1.5–5)
LYMPHOCYTES NFR BLD AUTO: 25.6 % (ref 24–44)
MCH RBC QN AUTO: 29.4 PG (ref 27–33)
MCHC RBC AUTO-ENTMCNC: 31.8 G/DL (ref 32–36.5)
MCV RBC AUTO: 92.7 FL (ref 80–96)
MONOCYTES # BLD AUTO: 0.9 10^3/UL (ref 0–0.8)
MONOCYTES NFR BLD AUTO: 7.4 % (ref 2–8)
NEUTROPHILS # BLD AUTO: 7.4 10^3/UL (ref 1.5–8.5)
NEUTROPHILS NFR BLD AUTO: 64.3 % (ref 36–66)
PLATELET # BLD AUTO: 339 10^3/UL (ref 150–450)
POTASSIUM SERPL-SCNC: 4.7 MEQ/L (ref 3.5–5.1)
RBC # BLD AUTO: 4.11 10^6/UL (ref 4.3–6.1)
SODIUM SERPL-SCNC: 138 MEQ/L (ref 136–145)
T4 FREE SERPL-MCNC: 0.69 NG/DL (ref 0.76–1.46)
TIBC SERPL-MCNC: 315 UG/DL (ref 250–450)
TSH SERPL DL<=0.005 MIU/L-ACNC: 1.94 UIU/ML (ref 0.36–3.74)
WBC # BLD AUTO: 11.6 10^3/UL (ref 4–10)

## 2022-10-05 ENCOUNTER — HOSPITAL ENCOUNTER (OUTPATIENT)
Dept: HOSPITAL 53 - M PAIN | Age: 47
End: 2022-10-05
Attending: FAMILY MEDICINE
Payer: COMMERCIAL

## 2022-10-05 DIAGNOSIS — F43.10: ICD-10-CM

## 2022-10-05 DIAGNOSIS — J45.909: ICD-10-CM

## 2022-10-05 DIAGNOSIS — F17.210: ICD-10-CM

## 2022-10-05 DIAGNOSIS — Z91.048: ICD-10-CM

## 2022-10-05 DIAGNOSIS — M51.16: Primary | ICD-10-CM

## 2022-10-05 DIAGNOSIS — F32.9: ICD-10-CM

## 2022-10-05 DIAGNOSIS — F41.1: ICD-10-CM

## 2022-10-05 DIAGNOSIS — M48.061: ICD-10-CM

## 2022-10-05 DIAGNOSIS — Z88.8: ICD-10-CM

## 2022-10-05 DIAGNOSIS — Z79.899: ICD-10-CM

## 2022-10-05 DIAGNOSIS — K21.9: ICD-10-CM

## 2022-10-05 DIAGNOSIS — E55.9: ICD-10-CM

## 2022-10-05 DIAGNOSIS — M51.34: ICD-10-CM

## 2022-10-05 DIAGNOSIS — M47.812: ICD-10-CM

## 2022-10-05 DIAGNOSIS — N32.81: ICD-10-CM

## 2022-10-05 DIAGNOSIS — E03.9: ICD-10-CM

## 2022-10-05 DIAGNOSIS — N18.30: ICD-10-CM

## 2022-10-05 DIAGNOSIS — E78.5: ICD-10-CM

## 2022-10-05 DIAGNOSIS — J44.9: ICD-10-CM

## 2022-10-05 DIAGNOSIS — G62.9: ICD-10-CM

## 2022-10-05 DIAGNOSIS — I73.00: ICD-10-CM

## 2022-10-05 DIAGNOSIS — K52.9: ICD-10-CM

## 2023-01-12 ENCOUNTER — HOSPITAL ENCOUNTER (OUTPATIENT)
Dept: HOSPITAL 53 - M PLALAB | Age: 48
End: 2023-01-12
Attending: PHYSICIAN ASSISTANT
Payer: COMMERCIAL

## 2023-01-12 ENCOUNTER — HOSPITAL ENCOUNTER (OUTPATIENT)
Dept: HOSPITAL 53 - M PLALAB | Age: 48
End: 2023-01-12
Attending: PHYSICAL MEDICINE & REHABILITATION
Payer: COMMERCIAL

## 2023-01-12 DIAGNOSIS — N18.30: ICD-10-CM

## 2023-01-12 DIAGNOSIS — M43.16: Primary | ICD-10-CM

## 2023-01-12 DIAGNOSIS — E78.2: ICD-10-CM

## 2023-01-12 DIAGNOSIS — M48.062: ICD-10-CM

## 2023-01-12 DIAGNOSIS — F33.1: ICD-10-CM

## 2023-01-12 DIAGNOSIS — D50.9: Primary | ICD-10-CM

## 2023-01-12 DIAGNOSIS — E55.9: ICD-10-CM

## 2023-01-12 LAB
25(OH)D3 SERPL-MCNC: 94.3 NG/ML (ref 20–100)
ALBUMIN SERPL BCG-MCNC: 3.7 G/DL (ref 3.2–5.2)
ALP SERPL-CCNC: 97 U/L (ref 46–116)
ALT SERPL W P-5'-P-CCNC: 25 U/L (ref 7–40)
APTT BLD: 30.8 SECONDS (ref 24.8–34.2)
AST SERPL-CCNC: 34 U/L (ref ?–34)
BILIRUB SERPL-MCNC: 0.2 MG/DL (ref 0.3–1.2)
BUN SERPL-MCNC: 11 MG/DL (ref 9–23)
CALCIUM SERPL-MCNC: 8.9 MG/DL (ref 8.5–10.1)
CHLORIDE SERPL-SCNC: 108 MMOL/L (ref 98–107)
CHOLEST SERPL-MCNC: 195 MG/DL (ref ?–200)
CHOLEST/HDLC SERPL: 4.58 {RATIO} (ref ?–5)
CLOSURE TME COLL+EPINEP BLD: 89 SECONDS (ref 74–162)
CO2 SERPL-SCNC: 24 MMOL/L (ref 20–31)
CREAT SERPL-MCNC: 1.09 MG/DL (ref 0.7–1.3)
EOSINOPHIL NFR BLD MANUAL: 3 % (ref 0–3)
EST. AVERAGE GLUCOSE BLD GHB EST-MCNC: 100 MG/DL (ref 60–110)
FERRITIN SERPL-MCNC: 23.7 NG/ML (ref 10.5–307.3)
GFR SERPL CREATININE-BSD FRML MDRD: > 60 ML/MIN/{1.73_M2} (ref 60–?)
GLUCOSE SERPL-MCNC: 71 MG/DL (ref 60–100)
HCT VFR BLD AUTO: 41.3 % (ref 42–52)
HDLC SERPL-MCNC: 42.5 MG/DL (ref 40–?)
HGB BLD-MCNC: 13.1 G/DL (ref 13.5–17.5)
INR PPP: 0.92
IRON SATN MFR SERPL: 15.2 % (ref 19.7–50)
IRON SERPL-MCNC: 42 UG/DL (ref 65–175)
LDLC SERPL CALC-MCNC: 112.7 MG/DL (ref ?–100)
LYMPHOCYTES NFR BLD MANUAL: 31 % (ref 16–44)
MCH RBC QN AUTO: 29.6 PG (ref 27–33)
MCHC RBC AUTO-ENTMCNC: 31.7 G/DL (ref 32–36.5)
MCV RBC AUTO: 93.4 FL (ref 80–96)
MONOCYTES NFR BLD MANUAL: 7 % (ref 0–5)
NEUTROPHILS NFR BLD MANUAL: 55 % (ref 28–66)
NONHDLC SERPL-MCNC: 153 MG/DL
PLATELET # BLD AUTO: 281 10^3/UL (ref 150–450)
PLATELET # BLD AUTO: 304 10^3/UL (ref 150–450)
PLATELET BLD QL SMEAR: NORMAL
POTASSIUM SERPL-SCNC: 3.7 MMOL/L (ref 3.5–5.1)
PROT SERPL-MCNC: 6.8 G/DL (ref 5.7–8.2)
PROTHROMBIN TIME: 12.6 SECONDS (ref 12.5–14.5)
RBC # BLD AUTO: 4.42 10^6/UL (ref 4.3–6.1)
SODIUM SERPL-SCNC: 142 MMOL/L (ref 136–145)
TIBC SERPL-MCNC: 277 UG/DL (ref 250–425)
TRIGL SERPL-MCNC: 199 MG/DL (ref ?–150)
VARIANT LYMPHS NFR BLD MANUAL: 3 % (ref 0–5)
WBC # BLD AUTO: 9.9 10^3/UL (ref 4–10)

## 2023-02-02 ENCOUNTER — HOSPITAL ENCOUNTER (OUTPATIENT)
Dept: HOSPITAL 53 - M LABSMTC | Age: 48
End: 2023-02-02
Attending: ANESTHESIOLOGY
Payer: COMMERCIAL

## 2023-02-02 DIAGNOSIS — Z01.812: Primary | ICD-10-CM

## 2023-02-02 DIAGNOSIS — Z20.822: ICD-10-CM

## 2023-02-07 ENCOUNTER — HOSPITAL ENCOUNTER (OUTPATIENT)
Dept: HOSPITAL 53 - M OPP | Age: 48
Discharge: HOME | End: 2023-02-07
Attending: INTERNAL MEDICINE
Payer: COMMERCIAL

## 2023-02-07 VITALS — HEIGHT: 65 IN | BODY MASS INDEX: 29.66 KG/M2 | WEIGHT: 178 LBS

## 2023-02-07 VITALS — DIASTOLIC BLOOD PRESSURE: 38 MMHG | SYSTOLIC BLOOD PRESSURE: 124 MMHG

## 2023-02-07 DIAGNOSIS — K92.1: ICD-10-CM

## 2023-02-07 DIAGNOSIS — K29.60: ICD-10-CM

## 2023-02-07 DIAGNOSIS — K44.9: ICD-10-CM

## 2023-02-07 DIAGNOSIS — K57.30: ICD-10-CM

## 2023-02-07 DIAGNOSIS — K31.A19: ICD-10-CM

## 2023-02-07 DIAGNOSIS — Z79.51: ICD-10-CM

## 2023-02-07 DIAGNOSIS — E78.00: ICD-10-CM

## 2023-02-07 DIAGNOSIS — Z79.891: ICD-10-CM

## 2023-02-07 DIAGNOSIS — K64.4: ICD-10-CM

## 2023-02-07 DIAGNOSIS — F31.9: ICD-10-CM

## 2023-02-07 DIAGNOSIS — Z79.52: ICD-10-CM

## 2023-02-07 DIAGNOSIS — F17.200: ICD-10-CM

## 2023-02-07 DIAGNOSIS — Z79.2: ICD-10-CM

## 2023-02-07 DIAGNOSIS — D12.6: Primary | ICD-10-CM

## 2023-02-07 DIAGNOSIS — N18.30: ICD-10-CM

## 2023-02-07 DIAGNOSIS — J44.9: ICD-10-CM

## 2023-02-07 DIAGNOSIS — K64.8: ICD-10-CM

## 2023-02-07 DIAGNOSIS — Z88.8: ICD-10-CM

## 2023-02-07 DIAGNOSIS — K31.89: ICD-10-CM

## 2023-02-07 DIAGNOSIS — Z79.899: ICD-10-CM

## 2023-02-07 DIAGNOSIS — F42.9: ICD-10-CM

## 2023-02-07 DIAGNOSIS — D50.9: ICD-10-CM

## 2023-02-07 DIAGNOSIS — K22.89: ICD-10-CM

## 2023-03-23 ENCOUNTER — HOSPITAL ENCOUNTER (OUTPATIENT)
Dept: HOSPITAL 53 - M PLALAB | Age: 48
End: 2023-03-23
Attending: ORTHOPAEDIC SURGERY
Payer: COMMERCIAL

## 2023-03-23 DIAGNOSIS — Z01.818: Primary | ICD-10-CM

## 2023-03-23 LAB
APTT BLD: 32.2 SECONDS (ref 24.8–34.2)
INR PPP: 0.84
PLATELET # BLD AUTO: 332 10^3/UL (ref 150–450)
PROTHROMBIN TIME: 11.7 SECONDS (ref 12.5–14.5)

## 2023-06-23 ENCOUNTER — HOSPITAL ENCOUNTER (EMERGENCY)
Dept: HOSPITAL 53 - M ED | Age: 48
Discharge: HOME | End: 2023-06-23
Payer: COMMERCIAL

## 2023-06-23 VITALS — TEMPERATURE: 97.6 F | DIASTOLIC BLOOD PRESSURE: 69 MMHG | SYSTOLIC BLOOD PRESSURE: 135 MMHG | OXYGEN SATURATION: 97 %

## 2023-06-23 VITALS — BODY MASS INDEX: 29.02 KG/M2 | WEIGHT: 180.56 LBS | HEIGHT: 66 IN

## 2023-06-23 DIAGNOSIS — F17.200: ICD-10-CM
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
## 2023-06-23 DIAGNOSIS — Z88.6: ICD-10-CM

## 2023-06-23 DIAGNOSIS — Z79.51: ICD-10-CM

## 2023-06-23 DIAGNOSIS — Z88.8: ICD-10-CM
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
## 2023-06-23 DIAGNOSIS — M54.9: ICD-10-CM

## 2023-06-23 DIAGNOSIS — E78.5: ICD-10-CM

## 2023-06-23 DIAGNOSIS — Z79.899: ICD-10-CM

## 2023-06-23 DIAGNOSIS — Z79.52: ICD-10-CM
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
## 2023-06-23 DIAGNOSIS — S82.832A: Primary | ICD-10-CM

## 2023-06-23 DIAGNOSIS — J44.9: ICD-10-CM

## 2023-08-03 ENCOUNTER — HOSPITAL ENCOUNTER (OUTPATIENT)
Dept: HOSPITAL 53 - M PLALAB | Age: 48
End: 2023-08-03
Attending: PHYSICIAN ASSISTANT
Payer: COMMERCIAL

## 2023-08-03 DIAGNOSIS — Z01.818: Primary | ICD-10-CM

## 2023-08-03 DIAGNOSIS — Z79.899: ICD-10-CM

## 2023-08-03 DIAGNOSIS — D50.9: Primary | ICD-10-CM

## 2023-08-03 LAB
APTT BLD: 28.7 SECONDS (ref 24.8–34.2)
BASOPHILS # BLD AUTO: 0.1 10^3/UL (ref 0–0.2)
BASOPHILS NFR BLD AUTO: 1.3 % (ref 0–1)
CLOSURE TME COLL+EPINEP BLD: 102 SECONDS (ref 74–162)
EOSINOPHIL # BLD AUTO: 0.2 10^3/UL (ref 0–0.5)
EOSINOPHIL NFR BLD AUTO: 2.1 % (ref 0–3)
FERRITIN SERPL-MCNC: 24.3 NG/ML (ref 10.5–307.3)
HCT VFR BLD AUTO: 35.5 % (ref 42–52)
HGB BLD-MCNC: 11 G/DL (ref 13.5–17.5)
INR PPP: 0.93
IRON SATN MFR SERPL: 48.3 % (ref 19.7–50)
IRON SERPL-MCNC: 144 UG/DL (ref 65–175)
LYMPHOCYTES # BLD AUTO: 3 10^3/UL (ref 1.5–5)
LYMPHOCYTES NFR BLD AUTO: 37.6 % (ref 24–44)
MCH RBC QN AUTO: 29.8 PG (ref 27–33)
MCHC RBC AUTO-ENTMCNC: 31 G/DL (ref 32–36.5)
MCV RBC AUTO: 96.2 FL (ref 80–96)
MONOCYTES # BLD AUTO: 0.8 10^3/UL (ref 0–0.8)
MONOCYTES NFR BLD AUTO: 10.6 % (ref 2–8)
NEUTROPHILS # BLD AUTO: 3.8 10^3/UL (ref 1.5–8.5)
NEUTROPHILS NFR BLD AUTO: 48 % (ref 36–66)
PLATELET # BLD AUTO: 348 10^3/UL (ref 150–450)
PLATELET # BLD AUTO: 359 10^3/UL (ref 150–450)
PROTHROMBIN TIME: 12.7 SECONDS (ref 12.5–14.5)
RBC # BLD AUTO: 3.69 10^6/UL (ref 4.3–6.1)
TIBC SERPL-MCNC: 298 UG/DL (ref 250–425)
WBC # BLD AUTO: 8 10^3/UL (ref 4–10)

## 2024-01-08 ENCOUNTER — HOSPITAL ENCOUNTER (OUTPATIENT)
Dept: HOSPITAL 53 - M PLALAB | Age: 49
End: 2024-01-08
Payer: COMMERCIAL

## 2024-01-08 DIAGNOSIS — D50.9: Primary | ICD-10-CM

## 2024-01-08 DIAGNOSIS — K86.81: ICD-10-CM

## 2024-01-08 LAB
BASOPHILS # BLD AUTO: 0.1 10^3/UL (ref 0–0.2)
BASOPHILS NFR BLD AUTO: 1.2 % (ref 0–1)
BUN SERPL-MCNC: 19 MG/DL (ref 9–23)
CREAT SERPL-MCNC: 1.11 MG/DL (ref 0.7–1.3)
EOSINOPHIL # BLD AUTO: 0.2 10^3/UL (ref 0–0.5)
EOSINOPHIL NFR BLD AUTO: 1.8 % (ref 0–3)
FERRITIN SERPL-MCNC: 15 NG/ML (ref 10.5–307.3)
FOLATE SERPL-MCNC: 9.6 NG/ML (ref 5.4–?)
GFR SERPL CREATININE-BSD FRML MDRD: > 60 ML/MIN/{1.73_M2} (ref 60–?)
HCT VFR BLD AUTO: 38.1 % (ref 42–52)
HGB BLD-MCNC: 12.4 G/DL (ref 13.5–17.5)
IRON SATN MFR SERPL: 14.5 % (ref 19.7–50)
IRON SERPL-MCNC: 42 UG/DL (ref 65–175)
LYMPHOCYTES # BLD AUTO: 2.7 10^3/UL (ref 1.5–5)
LYMPHOCYTES NFR BLD AUTO: 31.9 % (ref 24–44)
MCH RBC QN AUTO: 29.9 PG (ref 27–33)
MCHC RBC AUTO-ENTMCNC: 32.5 G/DL (ref 32–36.5)
MCV RBC AUTO: 91.8 FL (ref 80–96)
MONOCYTES # BLD AUTO: 0.7 10^3/UL (ref 0–0.8)
MONOCYTES NFR BLD AUTO: 8.3 % (ref 2–8)
NEUTROPHILS # BLD AUTO: 4.7 10^3/UL (ref 1.5–8.5)
NEUTROPHILS NFR BLD AUTO: 56.6 % (ref 36–66)
PLATELET # BLD AUTO: 382 10^3/UL (ref 150–450)
RBC # BLD AUTO: 4.15 10^6/UL (ref 4.3–6.1)
TIBC SERPL-MCNC: 290 UG/DL (ref 250–425)
VIT B12 SERPL-MCNC: 349 PG/ML (ref 211–911)
WBC # BLD AUTO: 8.4 10^3/UL (ref 4–10)

## 2024-01-26 ENCOUNTER — HOSPITAL ENCOUNTER (OUTPATIENT)
Dept: HOSPITAL 53 - M PLALAB | Age: 49
End: 2024-01-26
Attending: PHYSICIAN ASSISTANT
Payer: COMMERCIAL

## 2024-01-26 DIAGNOSIS — D50.9: ICD-10-CM

## 2024-01-26 DIAGNOSIS — E78.2: ICD-10-CM

## 2024-01-26 DIAGNOSIS — R00.2: ICD-10-CM

## 2024-01-26 DIAGNOSIS — F32.9: Primary | ICD-10-CM

## 2024-01-26 DIAGNOSIS — K21.9: ICD-10-CM

## 2024-01-26 LAB
25(OH)D3 SERPL-MCNC: 60.8 NG/ML (ref 20–100)
ALBUMIN SERPL BCG-MCNC: 3.7 G/DL (ref 3.2–5.2)
ALP SERPL-CCNC: 68 U/L (ref 46–116)
ALT SERPL W P-5'-P-CCNC: 25 U/L (ref 7–40)
AST SERPL-CCNC: 30 U/L (ref ?–34)
BASOPHILS # BLD AUTO: 0.1 10^3/UL (ref 0–0.2)
BASOPHILS NFR BLD AUTO: 1.6 % (ref 0–1)
BILIRUB SERPL-MCNC: 0.3 MG/DL (ref 0.3–1.2)
BUN SERPL-MCNC: 15 MG/DL (ref 9–23)
CALCIUM SERPL-MCNC: 9 MG/DL (ref 8.5–10.1)
CHLORIDE SERPL-SCNC: 109 MMOL/L (ref 98–107)
CHOLEST SERPL-MCNC: 228 MG/DL (ref ?–200)
CHOLEST/HDLC SERPL: 3.75 {RATIO} (ref ?–5)
CO2 SERPL-SCNC: 24 MMOL/L (ref 20–31)
CREAT SERPL-MCNC: 1.02 MG/DL (ref 0.7–1.3)
EOSINOPHIL # BLD AUTO: 0.1 10^3/UL (ref 0–0.5)
EOSINOPHIL NFR BLD AUTO: 2.4 % (ref 0–3)
EST. AVERAGE GLUCOSE BLD GHB EST-MCNC: 97 MG/DL (ref 60–110)
FOLATE SERPL-MCNC: 15.4 NG/ML (ref 5.4–?)
GFR SERPL CREATININE-BSD FRML MDRD: > 60 ML/MIN/{1.73_M2} (ref 60–?)
GLUCOSE SERPL-MCNC: 67 MG/DL (ref 60–100)
HCT VFR BLD AUTO: 38.2 % (ref 42–52)
HDLC SERPL-MCNC: 60.8 MG/DL (ref 40–?)
HGB BLD-MCNC: 12.2 G/DL (ref 13.5–17.5)
LDLC SERPL CALC-MCNC: 137.6 MG/DL (ref ?–100)
LYMPHOCYTES # BLD AUTO: 2.5 10^3/UL (ref 1.5–5)
LYMPHOCYTES NFR BLD AUTO: 43.5 % (ref 24–44)
MAGNESIUM SERPL-MCNC: 1.6 MG/DL (ref 1.8–2.4)
MCH RBC QN AUTO: 29.7 PG (ref 27–33)
MCHC RBC AUTO-ENTMCNC: 31.9 G/DL (ref 32–36.5)
MCV RBC AUTO: 92.9 FL (ref 80–96)
MONOCYTES # BLD AUTO: 0.6 10^3/UL (ref 0–0.8)
MONOCYTES NFR BLD AUTO: 9.7 % (ref 2–8)
NEUTROPHILS # BLD AUTO: 2.5 10^3/UL (ref 1.5–8.5)
NEUTROPHILS NFR BLD AUTO: 42.6 % (ref 36–66)
NONHDLC SERPL-MCNC: 167.2 MG/DL
PLATELET # BLD AUTO: 378 10^3/UL (ref 150–450)
POTASSIUM SERPL-SCNC: 4.7 MMOL/L (ref 3.5–5.1)
PROT SERPL-MCNC: 6.6 G/DL (ref 5.7–8.2)
RBC # BLD AUTO: 4.11 10^6/UL (ref 4.3–6.1)
SODIUM SERPL-SCNC: 138 MMOL/L (ref 136–145)
T4 FREE SERPL-MCNC: 0.93 NG/DL (ref 0.89–1.76)
TRIGL SERPL-MCNC: 148 MG/DL (ref ?–150)
TSH SERPL DL<=0.005 MIU/L-ACNC: 2.67 UIU/ML (ref 0.55–4.78)
VIT B12 SERPL-MCNC: 485 PG/ML (ref 211–911)
WBC # BLD AUTO: 5.8 10^3/UL (ref 4–10)

## 2024-02-05 ENCOUNTER — HOSPITAL ENCOUNTER (OUTPATIENT)
Dept: HOSPITAL 53 - M PLAIMG | Age: 49
End: 2024-02-05
Payer: COMMERCIAL

## 2024-02-05 DIAGNOSIS — K86.81: Primary | ICD-10-CM

## 2024-07-22 ENCOUNTER — HOSPITAL ENCOUNTER (OUTPATIENT)
Dept: HOSPITAL 53 - M PLALAB | Age: 49
End: 2024-07-22
Attending: PHYSICIAN ASSISTANT
Payer: COMMERCIAL

## 2024-07-22 DIAGNOSIS — J22: ICD-10-CM

## 2024-07-22 DIAGNOSIS — F43.10: ICD-10-CM

## 2024-07-22 DIAGNOSIS — R25.1: ICD-10-CM

## 2024-07-22 DIAGNOSIS — J44.9: Primary | ICD-10-CM

## 2024-07-22 LAB
BASOPHILS # BLD AUTO: 0.1 10^3/UL (ref 0–0.2)
BASOPHILS NFR BLD AUTO: 0.5 % (ref 0–1)
BUN SERPL-MCNC: 18 MG/DL (ref 9–23)
CALCIUM SERPL-MCNC: 8.9 MG/DL (ref 8.5–10.1)
CHLORIDE SERPL-SCNC: 104 MMOL/L (ref 98–107)
CO2 SERPL-SCNC: 21 MMOL/L (ref 20–31)
CREAT SERPL-MCNC: 1.09 MG/DL (ref 0.7–1.3)
EOSINOPHIL # BLD AUTO: 0 10^3/UL (ref 0–0.5)
EOSINOPHIL NFR BLD AUTO: 0.2 % (ref 0–3)
GFR SERPL CREATININE-BSD FRML MDRD: > 60 ML/MIN/{1.73_M2} (ref 60–?)
GLUCOSE SERPL-MCNC: 63 MG/DL (ref 60–100)
HCT VFR BLD AUTO: 36.8 % (ref 42–52)
HGB BLD-MCNC: 12.3 G/DL (ref 13.5–17.5)
LYMPHOCYTES # BLD AUTO: 1.1 10^3/UL (ref 1.5–5)
LYMPHOCYTES NFR BLD AUTO: 8.8 % (ref 24–44)
MAGNESIUM SERPL-MCNC: 1.8 MG/DL (ref 1.8–2.4)
MCH RBC QN AUTO: 31.9 PG (ref 27–33)
MCHC RBC AUTO-ENTMCNC: 33.4 G/DL (ref 32–36.5)
MCV RBC AUTO: 95.3 FL (ref 80–96)
MONOCYTES # BLD AUTO: 0.8 10^3/UL (ref 0–0.8)
MONOCYTES NFR BLD AUTO: 6.6 % (ref 2–8)
NEUTROPHILS # BLD AUTO: 10 10^3/UL (ref 1.5–8.5)
NEUTROPHILS NFR BLD AUTO: 83.3 % (ref 36–66)
PLATELET # BLD AUTO: 295 10^3/UL (ref 150–450)
POTASSIUM SERPL-SCNC: 4.4 MMOL/L (ref 3.5–5.1)
RBC # BLD AUTO: 3.86 10^6/UL (ref 4.3–6.1)
SODIUM SERPL-SCNC: 137 MMOL/L (ref 136–145)
WBC # BLD AUTO: 12 10^3/UL (ref 4–10)

## 2024-10-21 ENCOUNTER — HOSPITAL ENCOUNTER (OUTPATIENT)
Dept: HOSPITAL 53 - M SFHCADAM | Age: 49
End: 2024-10-21
Attending: PHYSICIAN ASSISTANT
Payer: COMMERCIAL

## 2024-10-21 DIAGNOSIS — R10.9: Primary | ICD-10-CM
